# Patient Record
Sex: FEMALE | Race: WHITE | NOT HISPANIC OR LATINO | Employment: PART TIME | URBAN - METROPOLITAN AREA
[De-identification: names, ages, dates, MRNs, and addresses within clinical notes are randomized per-mention and may not be internally consistent; named-entity substitution may affect disease eponyms.]

---

## 2018-03-07 ENCOUNTER — HOSPITAL ENCOUNTER (EMERGENCY)
Facility: HOSPITAL | Age: 38
Discharge: HOME | End: 2018-03-08
Attending: EMERGENCY MEDICINE | Admitting: EMERGENCY MEDICINE

## 2018-03-07 DIAGNOSIS — J11.1 INFLUENZA: Primary | ICD-10-CM

## 2018-03-07 DIAGNOSIS — J02.0 ACUTE STREPTOCOCCAL PHARYNGITIS: ICD-10-CM

## 2018-03-07 PROCEDURE — 99283 EMERGENCY DEPT VISIT LOW MDM: CPT

## 2018-03-07 PROCEDURE — 99282 EMERGENCY DEPT VISIT SF MDM: CPT | Performed by: EMERGENCY MEDICINE

## 2018-03-07 RX ORDER — IBUPROFEN 400 MG/1
TABLET ORAL
Status: COMPLETED
Start: 2018-03-07 | End: 2018-03-07

## 2018-03-07 RX ORDER — ONDANSETRON 4 MG/1
4 TABLET, ORALLY DISINTEGRATING ORAL ONCE
Status: COMPLETED | OUTPATIENT
Start: 2018-03-07 | End: 2018-03-07

## 2018-03-07 RX ORDER — ACETAMINOPHEN 500 MG
1000 TABLET ORAL ONCE
Status: COMPLETED | OUTPATIENT
Start: 2018-03-07 | End: 2018-03-07

## 2018-03-07 RX ORDER — PENICILLIN V POTASSIUM 500 MG/1
500 TABLET ORAL 4 TIMES DAILY
Qty: 40 TABLET | Refills: 0 | Status: SHIPPED | OUTPATIENT
Start: 2018-03-07 | End: 2018-03-17

## 2018-03-07 RX ORDER — OSELTAMIVIR PHOSPHATE 75 MG/1
75 CAPSULE ORAL 2 TIMES DAILY
Qty: 10 CAPSULE | Refills: 0 | Status: SHIPPED | OUTPATIENT
Start: 2018-03-07 | End: 2018-03-12

## 2018-03-07 RX ORDER — ONDANSETRON 4 MG/1
4 TABLET, ORALLY DISINTEGRATING ORAL EVERY 6 HOURS PRN
Qty: 20 TABLET | Refills: 0 | Status: SHIPPED | OUTPATIENT
Start: 2018-03-07 | End: 2019-01-11

## 2018-03-07 RX ORDER — OSELTAMIVIR PHOSPHATE 75 MG/1
75 CAPSULE ORAL ONCE
Status: COMPLETED | OUTPATIENT
Start: 2018-03-07 | End: 2018-03-07

## 2018-03-07 RX ORDER — PENICILLIN V POTASSIUM 250 MG/1
500 TABLET ORAL ONCE
Status: COMPLETED | OUTPATIENT
Start: 2018-03-07 | End: 2018-03-07

## 2018-03-07 RX ADMIN — ACETAMINOPHEN 1000 MG: 500 TABLET, FILM COATED ORAL at 23:14

## 2018-03-07 RX ADMIN — IBUPROFEN 800 MG: 400 TABLET ORAL at 23:14

## 2018-03-07 RX ADMIN — OSELTAMIVIR PHOSPHATE 75 MG: 75 CAPSULE ORAL at 23:15

## 2018-03-07 RX ADMIN — ONDANSETRON 4 MG: 4 TABLET, ORALLY DISINTEGRATING ORAL at 23:14

## 2018-03-07 RX ADMIN — PENICILLIN V POTASSIUM 500 MG: 250 TABLET ORAL at 23:15

## 2018-03-08 VITALS
OXYGEN SATURATION: 96 % | TEMPERATURE: 99 F | WEIGHT: 137 LBS | SYSTOLIC BLOOD PRESSURE: 133 MMHG | RESPIRATION RATE: 18 BRPM | DIASTOLIC BLOOD PRESSURE: 90 MMHG | HEART RATE: 100 BPM

## 2018-03-08 NOTE — DISCHARGE INSTRUCTIONS
Medications as directed. Plenty of fluids and rest. Tylenol or Ibuprofen as needed for fever,  Body aches and pain. Follow up with PMD as above. Return to ED for medical emergencies.    Influenza, Adult  Influenza, more commonly known as “the flu,” is a viral infection that primarily affects the respiratory tract. The respiratory tract includes organs that help you breathe, such as the lungs, nose, and throat. The flu causes many common cold symptoms, as well as a high fever and body aches.  The flu spreads easily from person to person (is contagious). Getting a flu shot (influenza vaccination) every year is the best way to prevent influenza.  What are the causes?  Influenza is caused by a virus. You can catch the virus by:  · Breathing in droplets from an infected person's cough or sneeze.  · Touching something that was recently contaminated with the virus and then touching your mouth, nose, or eyes.  What increases the risk?  The following factors may make you more likely to get the flu:  · Not cleaning your hands frequently with soap and water or alcohol-based hand .  · Having close contact with many people during cold and flu season.  · Touching your mouth, eyes, or nose without washing or sanitizing your hands first.  · Not drinking enough fluids or not eating a healthy diet.  · Not getting enough sleep or exercise.  · Being under a high amount of stress.  · Not getting a yearly (annual) flu shot.  You may be at a higher risk of complications from the flu, such as a severe lung infection (pneumonia), if you:  · Are over the age of 65.  · Are pregnant.  · Have a weakened disease-fighting system (immune system). You may have a weakened immune system if you:  ¨ Have HIV or AIDS.  ¨ Are undergoing chemotherapy.  ¨ Are taking medicines that reduce the activity of (suppress) the immune system.  · Have a long-term (chronic) illness, such as heart disease, kidney disease, diabetes, or lung disease.  · Have a  liver disorder.  · Are obese.  · Have anemia.  What are the signs or symptoms?  Symptoms of this condition typically last 4-10 days and may include:  · Fever.  · Chills.  · Headache, body aches, or muscle aches.  · Sore throat.  · Cough.  · Runny or congested nose.  · Chest discomfort and cough.  · Poor appetite.  · Weakness or tiredness (fatigue).  · Dizziness.  · Nausea or vomiting.  How is this diagnosed?  This condition may be diagnosed based on your medical history and a physical exam. Your health care provider may do a nose or throat swab test to confirm the diagnosis.  How is this treated?  If influenza is detected early, you can be treated with antiviral medicine that can reduce the length of your illness and the severity of your symptoms. This medicine may be given by mouth (orally) or through an IV tube that is inserted in one of your veins.  The goal of treatment is to relieve symptoms by taking care of yourself at home. This may include taking over-the-counter medicines, drinking plenty of fluids, and adding humidity to the air in your home.  In some cases, influenza goes away on its own. Severe influenza or complications from influenza may be treated in a hospital.  Follow these instructions at home:  · Take over-the-counter and prescription medicines only as told by your health care provider.  · Use a cool mist humidifier to add humidity to the air in your home. This can make breathing easier.  · Rest as needed.  · Drink enough fluid to keep your urine clear or pale yellow.  · Cover your mouth and nose when you cough or sneeze.  · Wash your hands with soap and water often, especially after you cough or sneeze. If soap and water are not available, use hand .  · Stay home from work or school as told by your health care provider. Unless you are visiting your health care provider, try to avoid leaving home until your fever has been gone for 24 hours without the use of medicine.  · Keep all  follow-up visits as told by your health care provider. This is important.  How is this prevented?  · Getting an annual flu shot is the best way to avoid getting the flu. You may get the flu shot in late summer, fall, or winter. Ask your health care provider when you should get your flu shot.  · Wash your hands often or use hand  often.  · Avoid contact with people who are sick during cold and flu season.  · Eat a healthy diet, drink plenty of fluids, get enough sleep, and exercise regularly.  Contact a health care provider if:  · You develop new symptoms.  · You have:  ¨ Chest pain.  ¨ Diarrhea.  ¨ A fever.  · Your cough gets worse.  · You produce more mucus.  · You feel nauseous or you vomit.  Get help right away if:  · You develop shortness of breath or difficulty breathing.  · Your skin or nails turn a bluish color.  · You have severe pain or stiffness in your neck.  · You develop a sudden headache or sudden pain in your face or ear.  · You cannot stop vomiting.  This information is not intended to replace advice given to you by your health care provider. Make sure you discuss any questions you have with your health care provider.  Document Released: 12/15/2001 Document Revised: 05/25/2017 Document Reviewed: 10/11/2016  [employer] Interactive Patient Education © 2017 [employer]

## 2018-03-08 NOTE — ED PROVIDER NOTES
Subjective   Patient is a 37 y.o. female presenting with influenza.   History provided by:  Patient  Influenza   Presenting symptoms: cough, fatigue, myalgias, nausea, sore throat and vomiting    Presenting symptoms: no fever, no headaches and no shortness of breath    Cough:     Cough characteristics:  Productive    Sputum characteristics:  White    Severity:  Moderate    Duration:  1 day    Timing:  Sporadic  Fatigue:     Severity:  Mild  Myalgias:     Location:  Generalized  Vomiting:     Quality:  Stomach contents    Number of occurrences:  Multiple  Severity:  Moderate  Relieved by:  None tried  Ineffective treatments:  None tried  Associated symptoms: decreased appetite and decreased physical activity    Associated symptoms: no chills, no ear pain, no mental status change, no congestion, no neck stiffness and no syncope    Risk factors: sick contacts (family members with influenza and strep)    Risk factors: not elderly, no diabetes problem, no heart disease, no kidney disease and no liver disease      HPI Narrative:Ms Talisha Salcido is a 38 yo WF who presents secondary to flu symptoms.  Patient reports multiple family members have been ill the past week.  She reports all 3 of her children were positive for both influenza and strep.  Patient had onset of symptoms yesterday.  Symptoms include sore throat, cough, nausea, vomiting, body aches.  Patient has not taken any over-the-counter medications at home for today symptoms.  She presents for evaluation.        Review of Systems   Constitutional: Positive for decreased appetite and fatigue. Negative for appetite change, chills, diaphoresis and fever.   HENT: Positive for sore throat. Negative for congestion and ear pain.    Eyes: Negative.    Respiratory: Positive for cough. Negative for chest tightness, shortness of breath and wheezing.    Cardiovascular: Negative for chest pain, palpitations and leg swelling.   Gastrointestinal: Positive for nausea and  vomiting.   Genitourinary: Negative.  Negative for difficulty urinating, flank pain, frequency and hematuria.   Musculoskeletal: Positive for myalgias. Negative for neck stiffness.   Skin: Negative.  Negative for color change, pallor and rash.   Neurological: Negative.  Negative for dizziness, seizures, syncope and headaches.   Psychiatric/Behavioral: Negative.  Negative for agitation, behavioral problems and hallucinations.       Past Medical History:   Diagnosis Date   • Depression        No Known Allergies    Past Surgical History:   Procedure Laterality Date   •  SECTION     • TUBAL ABDOMINAL LIGATION         History reviewed. No pertinent family history.    Social History     Social History   • Marital status:      Social History Main Topics   • Smoking status: Former Smoker   • Alcohol use No   • Drug use: No           Objective   Physical Exam   Constitutional: She is oriented to person, place, and time. She appears well-developed and well-nourished. No distress.   38 yo WF lying in bed. Pt is mildly ill appearing but non-toxic.  Occasionally cough during H&P.    HENT:   Head: Normocephalic and atraumatic.   Right Ear: External ear normal.   Left Ear: External ear normal.   Nose: Nose normal.   Mouth/Throat: Posterior oropharyngeal erythema present. No oropharyngeal exudate, posterior oropharyngeal edema or tonsillar abscesses.   Shotty lymphadenopathy bilateral submandibular and anterior chain.   Eyes: Conjunctivae and EOM are normal. Pupils are equal, round, and reactive to light.   Neck: Normal range of motion. Neck supple. No tracheal tenderness present. No rigidity. No tracheal deviation and normal range of motion present. No thyromegaly present.   Cardiovascular: Normal rate, regular rhythm, normal heart sounds and intact distal pulses.  Exam reveals no gallop and no friction rub.    No murmur heard.  Pulmonary/Chest: Effort normal and breath sounds normal. No stridor.   Abdominal: Soft.  Bowel sounds are normal. She exhibits no distension. There is no tenderness.   Musculoskeletal: Normal range of motion.   Neurological: She is alert and oriented to person, place, and time.   Skin: Skin is warm and dry. She is not diaphoretic.   Psychiatric: She has a normal mood and affect. Her behavior is normal.   Nursing note and vitals reviewed.      Procedures         ED Course  ED Course   Comment By Time   03/07/18  11:08 PM  Patient reports her 3 children had both flu swab and strep swabs were positive.  Patient has both flu and strep symptoms.  Thus we'll treat for both.  Giving Tylenol, Zofran, Pen-Vee K and Tamiflu. Juan F Salas MD 03/07 2308   03/08/18  12:12 AM  Patient's temperature now 99.0. Heart rate has normalized.  Will DC home. Juan F Salas MD 03/08 0012                  MDM  Number of Diagnoses or Management Options  Acute streptococcal pharyngitis: new and does not require workup  Influenza: new and does not require workup  Risk of Complications, Morbidity, and/or Mortality  Presenting problems: low  Diagnostic procedures: minimal    Patient Progress  Patient progress: improved      Final diagnoses:   Influenza   Acute streptococcal pharyngitis              Juan F Salas MD  03/08/18 0013

## 2018-07-23 ENCOUNTER — TRANSCRIBE ORDERS (OUTPATIENT)
Dept: ADMINISTRATIVE | Facility: HOSPITAL | Age: 38
End: 2018-07-23

## 2018-07-23 DIAGNOSIS — Z12.39 SCREENING BREAST EXAMINATION: Primary | ICD-10-CM

## 2018-09-30 ENCOUNTER — HOSPITAL ENCOUNTER (EMERGENCY)
Facility: HOSPITAL | Age: 38
Discharge: HOME | End: 2018-09-30
Attending: EMERGENCY MEDICINE | Admitting: EMERGENCY MEDICINE

## 2018-09-30 ENCOUNTER — APPOINTMENT (OUTPATIENT)
Dept: ULTRASOUND IMAGING | Facility: HOSPITAL | Age: 38
End: 2018-09-30

## 2018-09-30 VITALS
RESPIRATION RATE: 16 BRPM | DIASTOLIC BLOOD PRESSURE: 99 MMHG | BODY MASS INDEX: 27.05 KG/M2 | HEIGHT: 62 IN | TEMPERATURE: 98.4 F | SYSTOLIC BLOOD PRESSURE: 135 MMHG | HEART RATE: 82 BPM | OXYGEN SATURATION: 99 % | WEIGHT: 147 LBS

## 2018-09-30 DIAGNOSIS — R10.2 PELVIC PAIN: Primary | ICD-10-CM

## 2018-09-30 DIAGNOSIS — N83.201 OVARIAN CYST, RIGHT: ICD-10-CM

## 2018-09-30 LAB
ALBUMIN SERPL-MCNC: 4.2 G/DL (ref 3.5–5.2)
ALBUMIN/GLOB SERPL: 1.6 G/DL
ALP SERPL-CCNC: 130 U/L (ref 40–129)
ALT SERPL W P-5'-P-CCNC: 50 U/L (ref 5–33)
ANION GAP SERPL CALCULATED.3IONS-SCNC: 10.4 MMOL/L
AST SERPL-CCNC: 47 U/L (ref 5–32)
B-HCG UR QL: NEGATIVE
BACTERIA UR QL AUTO: ABNORMAL /HPF
BASOPHILS # BLD AUTO: 0.03 10*3/MM3 (ref 0–0.2)
BASOPHILS NFR BLD AUTO: 0.5 % (ref 0–2)
BILIRUB SERPL-MCNC: 0.3 MG/DL (ref 0.2–1.2)
BILIRUB UR QL STRIP: NEGATIVE
BUN BLD-MCNC: 10 MG/DL (ref 6–20)
BUN/CREAT SERPL: 16.7 (ref 7–25)
CALCIUM SPEC-SCNC: 8.4 MG/DL (ref 8.6–10.5)
CHLORIDE SERPL-SCNC: 106 MMOL/L (ref 98–107)
CLARITY UR: ABNORMAL
CLUE CELLS SPEC QL WET PREP: ABNORMAL
CO2 SERPL-SCNC: 24.6 MMOL/L (ref 22–29)
COLOR UR: ABNORMAL
CREAT BLD-MCNC: 0.6 MG/DL (ref 0.57–1)
DEPRECATED RDW RBC AUTO: 43.7 FL (ref 37–54)
EOSINOPHIL # BLD AUTO: 0.02 10*3/MM3 (ref 0.1–0.3)
EOSINOPHIL NFR BLD AUTO: 0.4 % (ref 0–4)
ERYTHROCYTE [DISTWIDTH] IN BLOOD BY AUTOMATED COUNT: 12.3 % (ref 11.5–14.5)
GFR SERPL CREATININE-BSD FRML MDRD: 112 ML/MIN/1.73
GLOBULIN UR ELPH-MCNC: 2.7 GM/DL
GLUCOSE BLD-MCNC: 82 MG/DL (ref 65–99)
GLUCOSE UR STRIP-MCNC: NEGATIVE MG/DL
HCG SERPL QL: NEGATIVE
HCT VFR BLD AUTO: 40.1 % (ref 37–47)
HGB BLD-MCNC: 13.6 G/DL (ref 12–16)
HGB UR QL STRIP.AUTO: ABNORMAL
HYALINE CASTS UR QL AUTO: ABNORMAL /LPF
HYDATID CYST SPEC WET PREP: ABNORMAL
IMM GRANULOCYTES # BLD: 0.01 10*3/MM3 (ref 0–0.03)
IMM GRANULOCYTES NFR BLD: 0.2 % (ref 0–0.5)
KETONES UR QL STRIP: NEGATIVE
KOH PREP NAIL: NORMAL
LEUKOCYTE ESTERASE UR QL STRIP.AUTO: NEGATIVE
LIPASE SERPL-CCNC: 99 U/L (ref 13–60)
LYMPHOCYTES # BLD AUTO: 1.46 10*3/MM3 (ref 0.6–4.8)
LYMPHOCYTES NFR BLD AUTO: 26.7 % (ref 20–45)
MCH RBC QN AUTO: 32.8 PG (ref 27–31)
MCHC RBC AUTO-ENTMCNC: 33.9 G/DL (ref 31–37)
MCV RBC AUTO: 96.6 FL (ref 81–99)
MONOCYTES # BLD AUTO: 0.35 10*3/MM3 (ref 0–1)
MONOCYTES NFR BLD AUTO: 6.4 % (ref 3–8)
NEUTROPHILS # BLD AUTO: 3.6 10*3/MM3 (ref 1.5–8.3)
NEUTROPHILS NFR BLD AUTO: 65.8 % (ref 45–70)
NITRITE UR QL STRIP: NEGATIVE
NRBC BLD MANUAL-RTO: 0 /100 WBC (ref 0–0)
PH UR STRIP.AUTO: 5.5 [PH] (ref 4.5–8)
PLATELET # BLD AUTO: 274 10*3/MM3 (ref 140–500)
PMV BLD AUTO: 9.4 FL (ref 7.4–10.4)
POTASSIUM BLD-SCNC: 4 MMOL/L (ref 3.5–5.2)
PROT SERPL-MCNC: 6.9 G/DL (ref 6–8.5)
PROT UR QL STRIP: ABNORMAL
RBC # BLD AUTO: 4.15 10*6/MM3 (ref 4.2–5.4)
RBC # UR: ABNORMAL /HPF
REF LAB TEST METHOD: ABNORMAL
SODIUM BLD-SCNC: 141 MMOL/L (ref 136–145)
SP GR UR STRIP: 1.03 (ref 1–1.03)
SQUAMOUS #/AREA URNS HPF: ABNORMAL /HPF
T VAGINALIS SPEC QL WET PREP: ABNORMAL
URATE CRY URNS QL MICRO: ABNORMAL /HPF
UROBILINOGEN UR QL STRIP: ABNORMAL
WBC NRBC COR # BLD: 5.47 10*3/MM3 (ref 4.8–10.8)
WBC SPEC QL WET PREP: ABNORMAL
WBC UR QL AUTO: ABNORMAL /HPF
YEAST GENITAL QL WET PREP: ABNORMAL

## 2018-09-30 PROCEDURE — 99282 EMERGENCY DEPT VISIT SF MDM: CPT | Performed by: EMERGENCY MEDICINE

## 2018-09-30 PROCEDURE — 87210 SMEAR WET MOUNT SALINE/INK: CPT | Performed by: EMERGENCY MEDICINE

## 2018-09-30 PROCEDURE — 84703 CHORIONIC GONADOTROPIN ASSAY: CPT | Performed by: EMERGENCY MEDICINE

## 2018-09-30 PROCEDURE — 96374 THER/PROPH/DIAG INJ IV PUSH: CPT

## 2018-09-30 PROCEDURE — 25010000002 KETOROLAC TROMETHAMINE PER 15 MG: Performed by: EMERGENCY MEDICINE

## 2018-09-30 PROCEDURE — 80053 COMPREHEN METABOLIC PANEL: CPT | Performed by: EMERGENCY MEDICINE

## 2018-09-30 PROCEDURE — 85025 COMPLETE CBC W/AUTO DIFF WBC: CPT | Performed by: EMERGENCY MEDICINE

## 2018-09-30 PROCEDURE — 81025 URINE PREGNANCY TEST: CPT | Performed by: EMERGENCY MEDICINE

## 2018-09-30 PROCEDURE — 76856 US EXAM PELVIC COMPLETE: CPT

## 2018-09-30 PROCEDURE — 99284 EMERGENCY DEPT VISIT MOD MDM: CPT

## 2018-09-30 PROCEDURE — 81001 URINALYSIS AUTO W/SCOPE: CPT | Performed by: EMERGENCY MEDICINE

## 2018-09-30 PROCEDURE — 76830 TRANSVAGINAL US NON-OB: CPT

## 2018-09-30 PROCEDURE — 83690 ASSAY OF LIPASE: CPT | Performed by: EMERGENCY MEDICINE

## 2018-09-30 PROCEDURE — 87220 TISSUE EXAM FOR FUNGI: CPT | Performed by: EMERGENCY MEDICINE

## 2018-09-30 RX ORDER — KETOROLAC TROMETHAMINE 30 MG/ML
15 INJECTION, SOLUTION INTRAMUSCULAR; INTRAVENOUS ONCE
Status: COMPLETED | OUTPATIENT
Start: 2018-09-30 | End: 2018-09-30

## 2018-09-30 RX ORDER — SODIUM CHLORIDE 0.9 % (FLUSH) 0.9 %
10 SYRINGE (ML) INJECTION AS NEEDED
Status: DISCONTINUED | OUTPATIENT
Start: 2018-09-30 | End: 2018-09-30 | Stop reason: HOSPADM

## 2018-09-30 RX ADMIN — KETOROLAC TROMETHAMINE 15 MG: 30 INJECTION INTRAMUSCULAR; INTRAVENOUS at 13:16

## 2018-10-10 ENCOUNTER — OFFICE VISIT (OUTPATIENT)
Dept: OBSTETRICS AND GYNECOLOGY | Facility: CLINIC | Age: 38
End: 2018-10-10

## 2018-10-10 VITALS
WEIGHT: 145 LBS | DIASTOLIC BLOOD PRESSURE: 64 MMHG | BODY MASS INDEX: 26.68 KG/M2 | HEIGHT: 62 IN | SYSTOLIC BLOOD PRESSURE: 100 MMHG

## 2018-10-10 DIAGNOSIS — Z90.79 H/O BILATERAL SALPINGECTOMY: ICD-10-CM

## 2018-10-10 DIAGNOSIS — N83.202 BILATERAL OVARIAN CYSTS: Primary | ICD-10-CM

## 2018-10-10 DIAGNOSIS — N83.201 BILATERAL OVARIAN CYSTS: Primary | ICD-10-CM

## 2018-10-10 DIAGNOSIS — R10.2 PELVIC PAIN IN FEMALE: ICD-10-CM

## 2018-10-10 PROCEDURE — 99204 OFFICE O/P NEW MOD 45 MIN: CPT | Performed by: OBSTETRICS & GYNECOLOGY

## 2018-10-10 NOTE — PROGRESS NOTES
Patient Care Team:  Ana Laura Ramos APRN as PCP - General (Family Medicine)    Patient new to practice? yes Patient new to examiner? yes     -----------------------------------------------------HISTORY---------------------------------------------------    Chief Complaint:   Chief Complaint   Patient presents with   • Consult     wants hysterectomy     New problem to examiner? yes    Patient's last menstrual period was 2018.      HPI:     Pt wants a hysterectomy.  Was told years ago she could have a hysterectomy by Mikki Jules for bleeding and pain.  Pt had Mirena placed and this has fixed her bleeding.   Pt has still has pain with ovarian cysts and the IUD is causing her pain. Pt has tried patches without success and wants definitive therapy.     HPI      Review of Systems   Constitutional: Negative.    HENT: Negative.    Eyes: Negative.    Respiratory: Negative.    Cardiovascular: Negative.    Gastrointestinal: Negative.    Endocrine: Negative.    Genitourinary: Positive for pelvic pain.   Musculoskeletal: Negative.    Skin: Negative.    Allergic/Immunologic: Negative.    Neurological: Negative.    Hematological: Negative.    Psychiatric/Behavioral: Negative.    :      PFSH: PAST HISTORY REVIEWED     1.    Past Medical History:   Diagnosis Date   • Depression            Status of: reviewed.      2. No family history on file.    3. Social History: :    Employment/occupation:  yes  Smoker: no  Alcohol: no Recreational drugs: no     4.   Past Surgical History:   Procedure Laterality Date   •  SECTION     • TUBAL ABDOMINAL LIGATION          History of classical Csection?  No.  PT HAS HAD 3 CSECTIONS.     5.   Current Outpatient Prescriptions:   •  diclofenac (VOLTAREN) 50 MG EC tablet, Take one tablet by mouth up to 3 times daily as needed for pain, Disp: 20 tablet, Rfl: 0  •  Escitalopram Oxalate (LEXAPRO PO), Take 10 mg by mouth., Disp: , Rfl:   •  ondansetron ODT (ZOFRAN ODT) 4 MG  "disintegrating tablet, Take 1 tablet by mouth Every 6 (Six) Hours As Needed for Nausea or Vomiting for up to 20 doses., Disp: 20 tablet, Rfl: 0  •  TRAZODONE HCL PO, Take  by mouth At Night As Needed., Disp: , Rfl:     6. No Known Allergies                  -----------------------------------------------PHYSICAL EXAM----------------------------------------------    Vital Signs: /64   Ht 157 cm (61.81\")   Wt 65.8 kg (145 lb)   LMP 09/26/2018   BMI 26.68 kg/m²    Flowsheet Rows      First Filed Value   Admission Height  157 cm (61.81\") Documented at 10/10/2018 1552   Admission Weight  65.8 kg (145 lb) Documented at 10/10/2018 1552          Physical Exam   Constitutional: She is oriented to person, place, and time. She appears well-developed and well-nourished. No distress.   Abdominal: Soft. She exhibits no distension and no mass. There is no tenderness. There is no rebound and no guarding. No hernia.   Musculoskeletal: Normal range of motion.   Neurological: She is alert and oriented to person, place, and time.   Skin: Skin is warm and dry. No rash noted. She is not diaphoretic. No erythema. No pallor.   Psychiatric: She has a normal mood and affect. Her behavior is normal. Judgment and thought content normal.   Nursing note and vitals reviewed.                          -----------------------------------------------MEDICAL DECISION MAKING-----------------------------  Risk counseling done:  YES    Results Reviewed:     1.   Lab Results (last 24 hours)     ** No results found for the last 24 hours. **        2.   Imaging Results (last 24 hours)     ** No results found for the last 24 hours. **        3.   ECG/EMG Results (most recent)     None          Old records reviewed?  NO    Diagnoses and/or chronic conditions reviewed with pt:  Talisha was seen today for consult.    Diagnoses and all orders for this visit:    Bilateral ovarian cysts    Pelvic pain in female    H/O bilateral salpingectomy        IMP: "  HX CHRONIC MENOMETRORRHAGIA.     PLAN: SONOHYSTOGRAM, EMBX, PAP    Labs/imaging ordered: NONE    RTO Return in about 2 weeks (around 10/24/2018).    Time: More than 50% of time spent in counseling and/or coordination of care:  Total face-to-face/floor time 49 min.  Time spent in counseling 30 min. Counseling included the following topics with prognosis, differential diagnosis, risks, benefits of treatment, instructions, complicance and/or risk reduction and alternatives: BLEEDING, TREATMENT OPTIONS.        Elvis Andre MD  4:21 PM  10/10/18

## 2018-11-14 ENCOUNTER — PROCEDURE VISIT (OUTPATIENT)
Dept: OBSTETRICS AND GYNECOLOGY | Facility: CLINIC | Age: 38
End: 2018-11-14

## 2018-11-14 ENCOUNTER — OFFICE VISIT (OUTPATIENT)
Dept: OBSTETRICS AND GYNECOLOGY | Facility: CLINIC | Age: 38
End: 2018-11-14

## 2018-11-14 DIAGNOSIS — Z11.51 SPECIAL SCREENING EXAMINATION FOR HUMAN PAPILLOMAVIRUS (HPV): ICD-10-CM

## 2018-11-14 DIAGNOSIS — N83.201 BILATERAL OVARIAN CYSTS: ICD-10-CM

## 2018-11-14 DIAGNOSIS — Z90.79 H/O BILATERAL SALPINGECTOMY: ICD-10-CM

## 2018-11-14 DIAGNOSIS — R10.2 PELVIC PAIN: ICD-10-CM

## 2018-11-14 DIAGNOSIS — Z12.4 PAP SMEAR FOR CERVICAL CANCER SCREENING: Primary | ICD-10-CM

## 2018-11-14 DIAGNOSIS — N92.0 MENORRHAGIA WITH REGULAR CYCLE: Primary | ICD-10-CM

## 2018-11-14 DIAGNOSIS — Z97.5 IUD CONTRACEPTION: ICD-10-CM

## 2018-11-14 DIAGNOSIS — R10.2 PELVIC PAIN IN FEMALE: ICD-10-CM

## 2018-11-14 DIAGNOSIS — N83.202 BILATERAL OVARIAN CYSTS: ICD-10-CM

## 2018-11-14 PROCEDURE — 58100 BIOPSY OF UTERUS LINING: CPT | Performed by: OBSTETRICS & GYNECOLOGY

## 2018-11-14 PROCEDURE — 76830 TRANSVAGINAL US NON-OB: CPT | Performed by: OBSTETRICS & GYNECOLOGY

## 2018-11-14 NOTE — PROGRESS NOTES
Patient Care Team:  Ana Laura Ramos APRN as PCP - General (Family Medicine)    Patient new to practice? no Patient new to examiner? no     -----------------------------------------------------HISTORY---------------------------------------------------    Chief Complaint: No chief complaint on file.    New problem to examiner? no    No LMP recorded.      HPI:     Here for sonohystogram, but pt has IUD.  Will do embx and pap.  Pt has hx pelvic pain and it was recommended that she have a hysterectomy.  Pt does not want a hysterectomy.  U/s abnormal.  Reviewed with pt:  ANTEVERTED UTERUS WITH AN ENDOMETRIAL MEASUREMENT OF cm. IUD SEEN IN THE ENDOMETRIUM  AT THE FUNDUS.  RIGHT OVARY APPEARS NORMAL.  TWO LEFT COMPLEX CYSTS MEASURING 3.8 x 2.4 x 2.6cm AND 4.2 x 2.9 x 2.2cm.  NO FREE FLUID OR ADNEXAL MASSES NOTED.        Pelvic Pain   The patient's primary symptoms include pelvic pain. The current episode started more than 1 year ago. The problem occurs intermittently. The problem has been unchanged. The problem affects the left side. Pertinent negatives include no fever.         Review of Systems   Constitutional: Negative for fever.   Genitourinary: Positive for pelvic pain.   :      PFSH: PAST HISTORY REVIEWED     1.    Past Medical History:   Diagnosis Date   • Depression            Status of: reviewed.      2. History reviewed. No pertinent family history.    3. Social History: :    Employment/occupation:  yes  Smoker: no  Alcohol: no Recreational drugs: no     4.   Past Surgical History:   Procedure Laterality Date   •  SECTION     • TUBAL ABDOMINAL LIGATION          History of classical Csection?  no    5.   Current Outpatient Medications:   •  diclofenac (VOLTAREN) 50 MG EC tablet, Take one tablet by mouth up to 3 times daily as needed for pain, Disp: 20 tablet, Rfl: 0  •  Escitalopram Oxalate (LEXAPRO PO), Take 10 mg by mouth., Disp: , Rfl:   •  ondansetron ODT (ZOFRAN ODT) 4 MG disintegrating  tablet, Take 1 tablet by mouth Every 6 (Six) Hours As Needed for Nausea or Vomiting for up to 20 doses., Disp: 20 tablet, Rfl: 0  •  TRAZODONE HCL PO, Take  by mouth At Night As Needed., Disp: , Rfl:     6. No Known Allergies                  -----------------------------------------------PHYSICAL EXAM----------------------------------------------    Vital Signs: There were no vitals taken for this visit.     Physical Exam   Constitutional: She is oriented to person, place, and time. She appears well-developed and well-nourished.   HENT:   Head: Normocephalic and atraumatic.   Pulmonary/Chest: Effort normal.   Abdominal: Soft. She exhibits no distension and no mass. There is no tenderness. There is no rebound and no guarding.   Genitourinary: Vagina normal and uterus normal.   Genitourinary Comments: IUD string seen, embx and pap done:    Endometrial Biopsy Procedure Note:    Pt was in the dorsolithotomy position. Speculum placed, cervix visualized, and betadine was applied to the os.  The endocervix was easily traversed and the uterus was sounded to 7 cm. Tenaculum was  used to stabilize the cervix. A pipelle was used to obtain endometrial tissue. All instruments were removed.  Pt tolerated procedure well.       Musculoskeletal: Normal range of motion.   Neurological: She is alert and oriented to person, place, and time.   Skin: Skin is warm and dry.   Psychiatric: She has a normal mood and affect. Her behavior is normal. Judgment and thought content normal.   Nursing note and vitals reviewed.                          -----------------------------------------------MEDICAL DECISION MAKING-----------------------------  Risk counseling done:  yes    Results Reviewed:     1.   Lab Results (last 24 hours)     ** No results found for the last 24 hours. **        2.   Imaging Results (last 24 hours)     ** No results found for the last 24 hours. **        3.   ECG/EMG Results (most recent)     None          Old records  reviewed?  no    Diagnoses and/or chronic conditions reviewed with pt:  Diagnoses and all orders for this visit:    Pap smear for cervical cancer screening  -     Pap IG, HPV-hr    Bilateral ovarian cysts    Pelvic pain in female  -     Reference Histopathology    Peripartum cardiomyopathy    H/O bilateral salpingectomy    Special screening examination for human papillomavirus (HPV)  -     Pap IG, HPV-hr    IUD contraception-Mirena        IMP:  Pelvic pain, IUD.      PLAN: check embx, pap.     Labs/imaging ordered: u/s today.     RTO Return if symptoms worsen or fail to improve.          Elvis Andre MD  10:50 AM  11/14/18

## 2018-11-19 LAB
DX ICD CODE: NORMAL
DX ICD CODE: NORMAL
PATH REPORT.FINAL DX SPEC: NORMAL
PATH REPORT.GROSS SPEC: NORMAL
PATH REPORT.SITE OF ORIGIN SPEC: NORMAL
PATHOLOGIST NAME: NORMAL
PAYMENT PROCEDURE: NORMAL

## 2018-11-20 PROBLEM — B97.7 HPV IN FEMALE: Status: ACTIVE | Noted: 2018-11-20

## 2018-11-20 LAB
CYTOLOGIST CVX/VAG CYTO: ABNORMAL
CYTOLOGY CVX/VAG DOC THIN PREP: ABNORMAL
DX ICD CODE: ABNORMAL
HIV 1 & 2 AB SER-IMP: ABNORMAL
HPV I/H RISK 1 DNA CVX QL PROBE+SIG AMP: POSITIVE
Lab: ABNORMAL
OTHER STN SPEC: ABNORMAL
PATH REPORT.FINAL DX SPEC: ABNORMAL
STAT OF ADQ CVX/VAG CYTO-IMP: ABNORMAL

## 2018-12-07 ENCOUNTER — HOSPITAL ENCOUNTER (EMERGENCY)
Facility: HOSPITAL | Age: 38
Discharge: HOME | End: 2018-12-07
Attending: EMERGENCY MEDICINE | Admitting: EMERGENCY MEDICINE

## 2018-12-07 ENCOUNTER — APPOINTMENT (OUTPATIENT)
Dept: GENERAL RADIOLOGY | Facility: HOSPITAL | Age: 38
End: 2018-12-07

## 2018-12-07 VITALS
WEIGHT: 150 LBS | SYSTOLIC BLOOD PRESSURE: 128 MMHG | DIASTOLIC BLOOD PRESSURE: 74 MMHG | HEART RATE: 87 BPM | HEIGHT: 62 IN | BODY MASS INDEX: 27.6 KG/M2 | OXYGEN SATURATION: 99 % | TEMPERATURE: 98.6 F | RESPIRATION RATE: 16 BRPM

## 2018-12-07 DIAGNOSIS — S91.341A: Primary | ICD-10-CM

## 2018-12-07 PROCEDURE — 90471 IMMUNIZATION ADMIN: CPT | Performed by: EMERGENCY MEDICINE

## 2018-12-07 PROCEDURE — 99283 EMERGENCY DEPT VISIT LOW MDM: CPT

## 2018-12-07 PROCEDURE — 25010000002 TDAP 5-2.5-18.5 LF-MCG/0.5 SUSPENSION: Performed by: EMERGENCY MEDICINE

## 2018-12-07 PROCEDURE — 90715 TDAP VACCINE 7 YRS/> IM: CPT | Performed by: EMERGENCY MEDICINE

## 2018-12-07 PROCEDURE — 10120 INC&RMVL FB SUBQ TISS SMPL: CPT | Performed by: EMERGENCY MEDICINE

## 2018-12-07 PROCEDURE — 73630 X-RAY EXAM OF FOOT: CPT

## 2018-12-07 RX ORDER — HYDROCODONE BITARTRATE AND ACETAMINOPHEN 5; 325 MG/1; MG/1
1-2 TABLET ORAL EVERY 4 HOURS PRN
Qty: 10 TABLET | Refills: 0 | Status: SHIPPED | OUTPATIENT
Start: 2018-12-07 | End: 2019-01-11

## 2018-12-07 RX ORDER — LEVOFLOXACIN 750 MG/1
750 TABLET ORAL DAILY
Qty: 7 TABLET | Refills: 0 | Status: SHIPPED | OUTPATIENT
Start: 2018-12-07 | End: 2018-12-14

## 2018-12-07 RX ORDER — LEVOFLOXACIN 750 MG/1
750 TABLET ORAL ONCE
Status: COMPLETED | OUTPATIENT
Start: 2018-12-07 | End: 2018-12-07

## 2018-12-07 RX ADMIN — LEVOFLOXACIN 750 MG: 750 TABLET, FILM COATED ORAL at 02:44

## 2018-12-07 RX ADMIN — TETANUS TOXOID, REDUCED DIPHTHERIA TOXOID AND ACELLULAR PERTUSSIS VACCINE, ADSORBED 0.5 ML: 5; 2.5; 8; 8; 2.5 SUSPENSION INTRAMUSCULAR at 01:22

## 2018-12-07 NOTE — ED PROVIDER NOTES
"Subjective     History provided by:  Patient    History of Present Illness    · Chief complaint: Stepped on a nail    · Location: Ball of her left foot    · Quality/Severity:Moderate pain, small sized nail puncture site     · Timing/Onset:Midnight     · Modifying Factors: Weight bearing on the site exacerbates pain    · Associated symptoms: Tenderness around puncture site.  Denies uncontrolled bleeding from the site.  Denies any fever.    · Narrative: The patient is a 38-year-old white female who presents after stepping on a nail.  The nail went through her left boot and punctured the ball of her left foot.  This occurred at midnight just prior to arrival.  She rinsed the site and put a Band-Aid over the puncture wound.  She denies any uncontrolled bleeding.  The nail stayed in her shoe after she lifted her foot but did not remain penetrated in the skin.  She says that she \"hobbled\" into the ER because she cannot put any weight on the site.  She says her tetanus shot is not up-to-date.  She denies any other secondary injuries.    ED Triage Vitals   Temp Heart Rate Resp BP SpO2   12/07/18 0249 12/07/18 0249 12/07/18 0249 12/07/18 0058 12/07/18 0058   98.6 °F (37 °C) 87 16 136/90 99 %      Temp src Heart Rate Source Patient Position BP Location FiO2 (%)   12/07/18 0249 12/07/18 0249 -- -- --   Oral Monitor          Review of Systems   Constitutional: Negative for activity change, appetite change, chills, diaphoresis, fatigue and fever.   HENT: Negative for congestion, dental problem, ear pain, hearing loss, mouth sores, postnasal drip, rhinorrhea, sinus pressure, sore throat and voice change.    Eyes: Negative for photophobia, pain, discharge, redness and visual disturbance.   Respiratory: Negative for cough, chest tightness, shortness of breath, wheezing and stridor.    Cardiovascular: Negative for chest pain, palpitations and leg swelling.   Gastrointestinal: Negative for abdominal pain, diarrhea, nausea and " vomiting.   Genitourinary: Negative for difficulty urinating, dysuria, flank pain, frequency, hematuria and urgency.   Musculoskeletal: Positive for gait problem. Negative for arthralgias, back pain, joint swelling, myalgias, neck pain and neck stiffness.        Gait change secondary to not being able to put weight on the puncture wound.   Skin: Positive for wound. Negative for color change and rash.        Small puncture site on the ball of her left foot   Neurological: Negative for dizziness, tremors, seizures, syncope, facial asymmetry, speech difficulty, weakness, light-headedness, numbness and headaches.   Hematological: Negative for adenopathy.   Psychiatric/Behavioral: Negative.  Negative for confusion, decreased concentration and self-injury. The patient is not nervous/anxious.        Past Medical History:   Diagnosis Date   • Depression        No Known Allergies    Past Surgical History:   Procedure Laterality Date   •  SECTION     • TUBAL ABDOMINAL LIGATION         History reviewed. No pertinent family history.    Social History     Socioeconomic History   • Marital status:      Spouse name: Not on file   • Number of children: Not on file   • Years of education: Not on file   • Highest education level: Not on file   Tobacco Use   • Smoking status: Former Smoker   Substance and Sexual Activity   • Alcohol use: No   • Drug use: No           Objective   Physical Exam   Constitutional: She is oriented to person, place, and time. She appears well-developed and well-nourished. No distress.   The patient is in no active distress.  Review of her vital signs: Her blood pressure is slightly elevated at 136/90, respiration rate normal at 16, heart rate slightly elevated at 87, she is afebrile at 98.6, normal O2 saturation 99% on room air.   HENT:   Head: Normocephalic and atraumatic.   Nose: Nose normal.   Mouth/Throat: Oropharynx is clear and moist. No oropharyngeal exudate.   Eyes: EOM are normal.  Pupils are equal, round, and reactive to light. Right eye exhibits no discharge. Left eye exhibits no discharge. No scleral icterus.   Neck: Normal range of motion. Neck supple. No JVD present. No thyromegaly present.   Cardiovascular: Normal rate, regular rhythm and normal heart sounds.   No murmur heard.  Pulmonary/Chest: Effort normal and breath sounds normal. She has no wheezes. She has no rales. She exhibits no tenderness.   Abdominal: Soft. Bowel sounds are normal. She exhibits no distension. There is no tenderness.   Musculoskeletal: Normal range of motion. She exhibits tenderness. She exhibits no edema or deformity.   Tenderness to palpation on the ball of left foot near puncture site.   Lymphadenopathy:     She has no cervical adenopathy.   Neurological: She is alert and oriented to person, place, and time. No cranial nerve deficit. Coordination normal.   No focal motor sensory deficit   Skin: Skin is warm and dry. Capillary refill takes less than 2 seconds. No rash noted. She is not diaphoretic.   Puncture site to ball of left foot with visible black foreign body consistent with a piece of rubber from the boot in the puncture wound.   Psychiatric: She has a normal mood and affect. Her behavior is normal. Judgment and thought content normal.   Nursing note and vitals reviewed.      Procedures           ED Course  ED Course as of Dec 07 0402   Fri Dec 07, 2018   0231 [facility] Report [id number] is blank  [TP]   8607 20:20 procedure coring of the puncture wound of the ball of the left foot.  Left foot was cleansed with Hibiclens scrub.  The puncture site was then anesthetized with lidocaine 2% with epinephrine buffered with Neut.  The puncture site with a foreign body was then cored with 11 blade scalpel and removed.  The cord cavity was then irrigated with normal saline and dressed with bacitracin.  [TP]   0400 The patient was administered Levaquin 750 mg po.  She was discharged with prescription for Levaquin 750  mg #7 and well a prescription for Lortab 5 mg #10.  She is instructed to make an appointment to follow with her PCP in a couple of days.  [TP]   0401 Her x-ray of her left foot showed no foreign body or fracture.  [TP]      ED Course User Index  [TP] Gino Koo MD                  MDM  Number of Diagnoses or Management Options  Puncture wound of foot with foreign body, right, initial encounter: new and requires workup     Amount and/or Complexity of Data Reviewed  Tests in the radiology section of CPT®: ordered and reviewed  Independent visualization of images, tracings, or specimens: yes    Patient Progress  Patient progress: improved        Final diagnoses:   Puncture wound of foot with foreign body, right, initial encounter           Labs Reviewed - No data to display  XR Foot 3+ View Left   ED Interpretation   No fracture or bony deformity.  No soft tissue foreign body.             Medication List      New Prescriptions    HYDROcodone-acetaminophen 5-325 MG per tablet  Commonly known as:  NORCO  Take 1-2 tablets by mouth Every 4 (Four) Hours As Needed for Severe Pain    for up to 10 doses.     levoFLOXacin 750 MG tablet  Commonly known as:  LEVAQUIN  Take 1 tablet by mouth Daily for 7 days.        Stop    diclofenac 50 MG EC tablet  Commonly known as:  Gino Trimble MD  12/07/18 0401

## 2018-12-07 NOTE — DISCHARGE INSTRUCTIONS
Wash the punctured area of your foot twice a day with soap and water and apply Neosporin and a Band-Aid.  Return if there develops redness, swelling, purulent drainage, or fever.

## 2019-01-08 ENCOUNTER — PROCEDURE VISIT (OUTPATIENT)
Dept: OBSTETRICS AND GYNECOLOGY | Facility: CLINIC | Age: 39
End: 2019-01-08

## 2019-01-08 ENCOUNTER — OFFICE VISIT (OUTPATIENT)
Dept: OBSTETRICS AND GYNECOLOGY | Facility: CLINIC | Age: 39
End: 2019-01-08

## 2019-01-08 VITALS — HEIGHT: 62 IN | BODY MASS INDEX: 27.2 KG/M2 | WEIGHT: 147.8 LBS

## 2019-01-08 DIAGNOSIS — Z98.891 HISTORY OF 3 CESAREAN SECTIONS: ICD-10-CM

## 2019-01-08 DIAGNOSIS — N83.202 BILATERAL OVARIAN CYSTS: Primary | ICD-10-CM

## 2019-01-08 DIAGNOSIS — N92.1 MENORRHAGIA WITH IRREGULAR CYCLE: ICD-10-CM

## 2019-01-08 DIAGNOSIS — R10.2 PELVIC PAIN IN FEMALE: ICD-10-CM

## 2019-01-08 DIAGNOSIS — N83.201 BILATERAL OVARIAN CYSTS: Primary | ICD-10-CM

## 2019-01-08 DIAGNOSIS — N83.202 BILATERAL OVARIAN CYSTS: ICD-10-CM

## 2019-01-08 DIAGNOSIS — N83.201 BILATERAL OVARIAN CYSTS: ICD-10-CM

## 2019-01-08 DIAGNOSIS — Z97.5 IUD CONTRACEPTION: ICD-10-CM

## 2019-01-08 DIAGNOSIS — Z90.79 H/O BILATERAL SALPINGECTOMY: ICD-10-CM

## 2019-01-08 PROCEDURE — 99214 OFFICE O/P EST MOD 30 MIN: CPT | Performed by: OBSTETRICS & GYNECOLOGY

## 2019-01-08 PROCEDURE — 76830 TRANSVAGINAL US NON-OB: CPT | Performed by: OBSTETRICS & GYNECOLOGY

## 2019-01-08 NOTE — PROGRESS NOTES
Patient Care Team:  Ana Laura Ramos APRN as PCP - General (Family Medicine)    Patient new to practice? no Patient new to examiner? no     -----------------------------------------------------HISTORY---------------------------------------------------    Chief Complaint:   Chief Complaint   Patient presents with   • Pre-op Exam     New problem to examiner? no    No LMP recorded.    HPI: History of Present Illness      Pt here to discuss endometrial ablation.  Pt has an IUD for menstrual control without success.  Has had a neg endometrial biopsy recently and desires ablation.  Has had her tubes tied.    Pt has hx ovarian cysts, and the last u/s showed:   ANTEVERTED UTERUS WITH AN ENDOMETRIAL MEASUREMENT OF cm. IUD SEEN IN THE ENDOMETRIUM  AT THE FUNDUS.  RIGHT OVARY APPEARS NORMAL.  TWO LEFT COMPLEX CYSTS MEASURING 3.8 x 2.4 x 2.6cm AND 4.2 x 2.9 x 2.2cm.  NO FREE FLUID OR ADNEXAL MASSES NOTED.    RPT U/S TODAY:  UTERUS IS ANTEVERTED  NL RT OV  LT OV CONTAINS 2 COMPLEX CYSTS= 1.6 X 1.0 CM AND 1.5 X 1.7 CM  SMALL AMT OF FREE FLUID WITHIN ENDOMETRIUM AND INTO UTERINE SCAR  NO ENDOMETRIAL MASSES SEEN  IUD NOTED IN PLACE    ROS:  Review of Systems   Constitutional: Negative.    HENT: Negative.    Eyes: Negative.    Respiratory: Negative.    Cardiovascular: Negative.    Gastrointestinal: Negative.    Endocrine: Negative.    Genitourinary: Positive for menstrual problem.   Musculoskeletal: Negative.    Skin: Negative.    Allergic/Immunologic: Negative.    Neurological: Negative.    Hematological: Negative.    Psychiatric/Behavioral: Negative.    :      PF[facility]: PAST HISTORY REVIEWED     1.    Past Medical History:   Diagnosis Date   • Depression            Status of: reviewed     2. History reviewed. No pertinent family history.    3. Social History: :    Employment/occupation:  yes  Smoker: no  Alcohol: no Recreational drugs: no     4.   Past Surgical History:   Procedure Laterality Date   •  SECTION    "  • TUBAL ABDOMINAL LIGATION          History of classical Csection?  No, but pt has had 3 sections.    5.   Current Outpatient Medications:   •  Escitalopram Oxalate (LEXAPRO PO), Take 10 mg by mouth., Disp: , Rfl:   •  HYDROcodone-acetaminophen (NORCO) 5-325 MG per tablet, Take 1-2 tablets by mouth Every 4 (Four) Hours As Needed for Severe Pain  for up to 10 doses., Disp: 10 tablet, Rfl: 0  •  ondansetron ODT (ZOFRAN ODT) 4 MG disintegrating tablet, Take 1 tablet by mouth Every 6 (Six) Hours As Needed for Nausea or Vomiting for up to 20 doses., Disp: 20 tablet, Rfl: 0  •  TRAZODONE HCL PO, Take  by mouth At Night As Needed., Disp: , Rfl:     6. No Known Allergies                  -----------------------------------------------PHYSICAL EXAM----------------------------------------------    Vital Signs: Ht 157.5 cm (62.01\")   Wt 67 kg (147 lb 12.8 oz)   Breastfeeding? No   BMI 27.03 kg/m²    Flowsheet Rows      First Filed Value   Admission Height  157.5 cm (62.01\") Documented at 01/08/2019 1416   Admission Weight  67 kg (147 lb 12.8 oz) Documented at 01/08/2019 1416          Physical Exam   Constitutional: She is oriented to person, place, and time. She appears well-developed and well-nourished. No distress.   HENT:   Head: Normocephalic and atraumatic.   Eyes: EOM are normal.   Neck: Normal range of motion.   Pulmonary/Chest: Breath sounds normal.   Abdominal: Soft. Bowel sounds are normal. She exhibits no distension and no mass. There is no tenderness. There is no rebound and no guarding. No hernia.   Musculoskeletal: Normal range of motion.   Neurological: She is alert and oriented to person, place, and time.   Skin: Skin is warm and dry. No rash noted. She is not diaphoretic. No erythema. No pallor.   Psychiatric: She has a normal mood and affect. Her behavior is normal. Judgment and thought content normal.   Nursing note and vitals " reviewed.                          -----------------------------------------------MEDICAL DECISION MAKING-----------------------------  Risk counseling done:  no    Results Reviewed:     1.   Lab Results (last 24 hours)     ** No results found for the last 24 hours. **        2.   Imaging Results (last 24 hours)     ** No results found for the last 24 hours. **        3.   ECG/EMG Results (most recent)     None          Old records reviewed?  yes    Diagnoses and/or chronic conditions reviewed with pt:  Talisha was seen today for pre-op exam.    Diagnoses and all orders for this visit:    Peripartum cardiomyopathy    Pelvic pain in female    Bilateral ovarian cysts    IUD contraception-Mirena  -     Case Request; Standing  -     CBC and Differential; Future  -     Case Request    H/O bilateral salpingectomy  -     Case Request; Standing  -     CBC and Differential; Future  -     Case Request    Menorrhagia with irregular cycle  -     Case Request; Standing  -     CBC and Differential; Future  -     Case Request    History of 3  sections    Other orders  -     Follow Anesthesia Guidelines / Standing Orders; Future  -     Follow Anesthesia Guidelines / Standing Orders; Standing  -     Obtain informed consent; Standing  -     Place sequential compression device; Standing        IMP:  Chronic menometrorrhagia refractory to IUD.  Hx ovarian cysts, resolved.  Hx tubal.    PLAN: IUD removal, dx hysteroscopy, D&C, Novasure endometrial ablation.    Labs/imaging ordered: u/s    RISKS, ALTERNATIVES, COMPLICATIONS OF THE PROCEDURE INCLUDING BUT NOT LIMITED TO:    INTRAOPERATIVE RISKS: INJURY TO INTERNAL ORGANS (BOWEL, BLADDER, URETER,BLOOD VESSELS) OR HEMORRHAGE REQUIRING FURTHER SURGERY, INFECTION, AND DEATH;   POSTOP COMPLICATIONS: BLEEDING, INFECTION, PNEUMONIA, PULMONARY EMBOLISM, AND DEATH;   WERE EXPLAINED TO THE PT WHO VERBALIZED HER UNDERSTANDING.        RTO Return if symptoms worsen or fail to  improve.          Elvis Andre MD  3:22 PM  01/08/19

## 2019-01-11 ENCOUNTER — ANESTHESIA EVENT (OUTPATIENT)
Dept: PERIOP | Facility: HOSPITAL | Age: 39
End: 2019-01-11

## 2019-01-11 ENCOUNTER — APPOINTMENT (OUTPATIENT)
Dept: PREADMISSION TESTING | Facility: HOSPITAL | Age: 39
End: 2019-01-11

## 2019-01-11 VITALS
DIASTOLIC BLOOD PRESSURE: 77 MMHG | OXYGEN SATURATION: 98 % | HEART RATE: 66 BPM | BODY MASS INDEX: 27.27 KG/M2 | HEIGHT: 62 IN | WEIGHT: 148.2 LBS | SYSTOLIC BLOOD PRESSURE: 123 MMHG | RESPIRATION RATE: 16 BRPM

## 2019-01-11 LAB
ANION GAP SERPL CALCULATED.3IONS-SCNC: 13.8 MMOL/L
BASOPHILS # BLD AUTO: 0.04 10*3/MM3 (ref 0–0.2)
BASOPHILS NFR BLD AUTO: 0.6 % (ref 0–2)
BUN BLD-MCNC: 14 MG/DL (ref 6–20)
BUN/CREAT SERPL: 21.5 (ref 7–25)
CALCIUM SPEC-SCNC: 9 MG/DL (ref 8.6–10.5)
CHLORIDE SERPL-SCNC: 101 MMOL/L (ref 98–107)
CO2 SERPL-SCNC: 28.2 MMOL/L (ref 22–29)
CREAT BLD-MCNC: 0.65 MG/DL (ref 0.57–1)
DEPRECATED RDW RBC AUTO: 41.1 FL (ref 37–54)
EOSINOPHIL # BLD AUTO: 0.03 10*3/MM3 (ref 0.1–0.3)
EOSINOPHIL NFR BLD AUTO: 0.4 % (ref 0–4)
ERYTHROCYTE [DISTWIDTH] IN BLOOD BY AUTOMATED COUNT: 11.8 % (ref 11.5–14.5)
GFR SERPL CREATININE-BSD FRML MDRD: 103 ML/MIN/1.73
GLUCOSE BLD-MCNC: 118 MG/DL (ref 65–99)
HCT VFR BLD AUTO: 42.3 % (ref 37–47)
HGB BLD-MCNC: 14 G/DL (ref 12–16)
IMM GRANULOCYTES # BLD AUTO: 0.03 10*3/MM3 (ref 0–0.03)
IMM GRANULOCYTES NFR BLD AUTO: 0.4 % (ref 0–0.5)
LYMPHOCYTES # BLD AUTO: 1.29 10*3/MM3 (ref 0.6–4.8)
LYMPHOCYTES NFR BLD AUTO: 18.1 % (ref 20–45)
MCH RBC QN AUTO: 31.5 PG (ref 27–31)
MCHC RBC AUTO-ENTMCNC: 33.1 G/DL (ref 31–37)
MCV RBC AUTO: 95.1 FL (ref 81–99)
MONOCYTES # BLD AUTO: 0.39 10*3/MM3 (ref 0–1)
MONOCYTES NFR BLD AUTO: 5.5 % (ref 3–8)
NEUTROPHILS # BLD AUTO: 5.36 10*3/MM3 (ref 1.5–8.3)
NEUTROPHILS NFR BLD AUTO: 75 % (ref 45–70)
NRBC BLD AUTO-RTO: 0 /100 WBC (ref 0–0)
PLATELET # BLD AUTO: 274 10*3/MM3 (ref 140–500)
PMV BLD AUTO: 9.5 FL (ref 7.4–10.4)
POTASSIUM BLD-SCNC: 3.8 MMOL/L (ref 3.5–5.2)
RBC # BLD AUTO: 4.45 10*6/MM3 (ref 4.2–5.4)
SODIUM BLD-SCNC: 143 MMOL/L (ref 136–145)
WBC NRBC COR # BLD: 7.14 10*3/MM3 (ref 4.8–10.8)

## 2019-01-11 PROCEDURE — 80048 BASIC METABOLIC PNL TOTAL CA: CPT | Performed by: OBSTETRICS & GYNECOLOGY

## 2019-01-11 PROCEDURE — 93005 ELECTROCARDIOGRAM TRACING: CPT

## 2019-01-11 PROCEDURE — 93010 ELECTROCARDIOGRAM REPORT: CPT | Performed by: INTERNAL MEDICINE

## 2019-01-11 PROCEDURE — 36415 COLL VENOUS BLD VENIPUNCTURE: CPT | Performed by: OBSTETRICS & GYNECOLOGY

## 2019-01-11 PROCEDURE — 85025 COMPLETE CBC W/AUTO DIFF WBC: CPT | Performed by: OBSTETRICS & GYNECOLOGY

## 2019-01-11 RX ORDER — CITALOPRAM 20 MG/1
20 TABLET ORAL NIGHTLY
COMMUNITY
End: 2019-11-09

## 2019-01-11 RX ORDER — LIDOCAINE 50 MG/G
1 PATCH TOPICAL DAILY PRN
COMMUNITY
End: 2020-02-05

## 2019-01-11 NOTE — DISCHARGE INSTRUCTIONS
PRE-ADMISSION TESTING INSTRUCTIONS FOR ADULTS    Take these medications the morning of surgery with a small sip of water:      No aspirin, advil, aleve, ibuprofen, naproxen, diet pills, decongestants, or herbal/vitamins for a week prior to surgery.    General Instructions:    • Do not eat solid food after midnight the night before surgery.  No gum, mints, or hard candy after midnight the night before surgery.  • You may drink clear liquids the day of surgery up until 2 hours before your arrival time.  (until 8:30 am)  • Clear liquids are liquids you can see through. Nothing RED in color.    Plain water    Sports drinks  Sodas     Gelatin (Jell-O)  Fruit juices without pulp such as white grape juice and apple juice  Popsicles that contain no fruit or yogurt  Tea or coffee (no cream or milk added)    • It is beneficial for you to have a clear drink that contains carbohydrates just before you leave your house and before your fasting time begins.  We suggest a 20 ounce bottle of Gatorade or Powerade for non-diabetic patients or a 20 ounce bottle of G2 or Powerade Zero for diabetic patients.     • Patients who avoid smoking, chewing tobacco and alcohol for 4 weeks prior to surgery have a reduced risk of post-operative complications.  If at all possible, quit smoking as many days before surgery as you can.    • Do not smoke, use chewing tobacco or drink alcohol the day of surgery    • Bring your C-PAP/ BI-PAP machine if you use one.  • Wear clean comfortable clothes and socks.  • Do not wear contact lenses, lotion, deodorant, or make-up.  Bring a case for your glasses if applicable. You may brush your teeth the morning of surgery.  • You may wear dentures/partials, do not put adhesive/glue on them.  • Bring crutches or walker if applicable.  • Leave all other jewelry and valuables at home.      Preventing a Surgical Site Infection:    • Shower the night before and on the morning of surgery using the chlorhexidine soap you  were given.  Use a clean washcloth with the soap.  Place clean sheets on your bed after showering the night before surgery. Do not use the CHG soap on your hair, face, or private areas. Wash your body gently for five (5) minutes. Do not scrub your skin.  Dry with a clean towel and dress in clean clothing.    • Do not shave the surgical area for 10 days-2 weeks prior to surgery  because the razor can irritate skin and make it easier to develop an infection.  • Make sure you, your family, and all healthcare providers clean their hands with soap and water or an alcohol based hand  before caring for you or your wound.      Day of surgery:    Your surgeon’s office will advise you of your arrival time for the day of surgery.    Upon arrival, a Pre-op nurse and Anesthesia provider will review your health history, obtain vital signs, and answer questions you may have.  The only belongings needed at this time will be your home medications and if applicable your C-PAP/BI-PAP machine.  If you are staying overnight your family can leave the rest of your belongings in the car and bring them to your room later.  A Pre-op nurse will start an IV and you may receive medication in preparation for surgery, including something to help you relax.  Your family will be able to see you in the Pre-op area.  While you are in surgery your family should notify the waiting room  if they leave the waiting room area and provide a contact phone number.    IF you have any questions, you can call the Pre-Admission Department at [phone] or your surgeon's office.  Notify your surgeon if  you become sick, have a fever, productive cough, or cannot be here the day of surgery    Please be aware that surgery does come with discomfort.  We want to make every effort to control your discomfort so please discuss any uncontrolled symptoms with your nurse.   Your doctor will most likely have prescribed pain medications.      If you  are going home after surgery, you will receive individualized written care instructions before being discharged.  A responsible adult (over the age of 18) must drive you to and from the hospital on the day of your surgery and stay with you for 24 hours after anesthesia.    If you are staying overnight following surgery, you will be transported to your hospital room following the recovery period.  [facility] has all private rooms.    Deductibles and co-payments are collected on the day of service. Please be prepared to pay the required co-pay, deductible or deposit on the day of service as defined by your plan.    Hysteroscopy  Hysteroscopy is a procedure used for looking inside the womb (uterus). It may be done for various reasons, including:  · To evaluate abnormal bleeding, fibroid (benign, noncancerous) tumors, polyps, scar tissue (adhesions), and possibly cancer of the uterus.  · To look for lumps (tumors) and other uterine growths.  · To look for causes of why a woman cannot get pregnant (infertility), causes of recurrent loss of pregnancy (miscarriages), or a lost intrauterine device (IUD).  · To perform a sterilization by blocking the fallopian tubes from inside the uterus.  In this procedure, a thin, flexible tube with a tiny light and camera on the end of it (hysteroscope) is used to look inside the uterus. A hysteroscopy should be done right after a menstrual period to be sure you are not pregnant.  LET YOUR HEALTH CARE PROVIDER KNOW ABOUT:   · Any allergies you have.  · All medicines you are taking, including vitamins, herbs, eye drops, creams, and over-the-counter medicines.  · Previous problems you or members of your family have had with the use of anesthetics.  · Any blood disorders you have.  · Previous surgeries you have had.  · Medical conditions you have.  RISKS AND COMPLICATIONS   Generally, this is a safe procedure. However, as with any procedure, complications can occur. Possible  complications include:  · Putting a hole in the uterus.  · Excessive bleeding.  · Infection.  · Damage to the cervix.  · Injury to other organs.  · Allergic reaction to medicines.  · Too much fluid used in the uterus for the procedure.  BEFORE THE PROCEDURE   · Ask your health care provider about changing or stopping any regular medicines.  · Do not take aspirin or blood thinners for 1 week before the procedure, or as directed by your health care provider. These can cause bleeding.  · If you smoke, do not smoke for 2 weeks before the procedure.  · In some cases, a medicine is placed in the cervix the day before the procedure. This medicine makes the cervix have a larger opening (dilate). This makes it easier for the instrument to be inserted into the uterus during the procedure.  · Do not eat or drink anything for at least 8 hours before the surgery.  · Arrange for someone to take you home after the procedure.  PROCEDURE   · You may be given a medicine to relax you (sedative). You may also be given one of the following:  ¨ A medicine that numbs the area around the cervix (local anesthetic).  ¨ A medicine that makes you sleep through the procedure (general anesthetic).  · The hysteroscope is inserted through the vagina into the uterus. The camera on the hysteroscope sends a picture to a TV screen. This gives the surgeon a good view inside the uterus.  · During the procedure, a liquid is put into the uterus, which allows the surgeon to see better.  · Sometimes, tissue is gently scraped from inside the uterus. These tissue samples are sent to a lab for testing.  AFTER THE PROCEDURE   · If you had a general anesthetic, you may be groggy for a couple hours after the procedure.  · If you had a local anesthetic, you will be able to go home as soon as you are stable and feel ready.  · You may have some cramping. This normally lasts for a couple days.  · You may have bleeding, which varies from light spotting for a few days  to menstrual-like bleeding for 3-7 days. This is normal.  · If your test results are not back during the visit, make an appointment with your health care provider to find out the results.     This information is not intended to replace advice given to you by your health care provider. Make sure you discuss any questions you have with your health care provider.     Document Released: 2002 Document Revised: 10/08/2014 Document Reviewed: 2014  [employer]® Patient Information © [employer].    Dilation and Curettage or Vacuum Curettage  Dilation and curettage (D&C) and vacuum curettage are minor procedures. A D&C involves stretching (dilation) the cervix and scraping (curettage) the inside lining of the womb (uterus). During a D&C, tissue is gently scraped from the inside lining of the uterus. During a vacuum curettage, the lining and tissue in the uterus are removed with the use of gentle suction.   Curettage may be performed to either diagnose or treat a problem. As a diagnostic procedure, curettage is performed to examine tissues from the uterus. A diagnostic curettage may be performed for the following symptoms:   · Irregular bleeding in the uterus.    · Bleeding with the development of clots.    · Spotting between menstrual periods.    · Prolonged menstrual periods.    · Bleeding after menopause.    · No menstrual period (amenorrhea).    · A change in size and shape of the uterus.    As a treatment procedure, curettage may be performed for the following reasons:   · Removal of an IUD (intrauterine device).    · Removal of retained placenta after giving birth. Retained placenta can cause an infection or bleeding severe enough to require transfusions.    · .    · Miscarriage.    · Removal of polyps inside the uterus.    · Removal of uncommon types of noncancerous lumps (fibroids).    LET YOUR HEALTH CARE PROVIDER KNOW ABOUT:   · Any allergies you have.    · All medicines you are taking,  including vitamins, herbs, eye drops, creams, and over-the-counter medicines.    · Previous problems you or members of your family have had with the use of anesthetics.    · Any blood disorders you have.    · Previous surgeries you have had.    · Medical conditions you have.  RISKS AND COMPLICATIONS   Generally, this is a safe procedure. However, as with any procedure, complications can occur. Possible complications include:  · Excessive bleeding.    · Infection of the uterus.    · Damage to the cervix.    · Development of scar tissue (adhesions) inside the uterus, later causing abnormal amounts of menstrual bleeding.    · Complications from the general anesthetic, if a general anesthetic is used.    · Putting a hole (perforation) in the uterus. This is rare.    BEFORE THE PROCEDURE   · Eat and drink before the procedure only as directed by your health care provider.    · Arrange for someone to take you home.    PROCEDURE   This procedure usually takes about 15-30 minutes.  · You will be given one of the following:    A medicine that numbs the area in and around the cervix (local anesthetic).      A medicine to make you sleep through the procedure (general anesthetic).  · You will lie on your back with your legs in stirrups.    · A warm metal or plastic instrument (speculum) will be placed in your vagina to keep it open and to allow the health care provider to see the cervix.  · There are two ways in which your cervix can be softened and dilated. These include:      Taking a medicine.      Having thin rods (laminaria) inserted into your cervix.    · A curved tool (curette) will be used to scrape cells from the inside lining of the uterus. In some cases, gentle suction is applied with the curette. The curette will then be removed.    AFTER THE PROCEDURE   · You will rest in the recovery area until you are stable and are ready to go home.    · You may feel sick to your stomach (nauseous) or throw up (vomit) if you  were given a general anesthetic.    · You may have a sore throat if a tube was placed in your throat during general anesthesia.    · You may have light cramping and bleeding. This may last for 2 days to 2 weeks after the procedure.    · Your uterus needs to make a new lining after the procedure. This may make your next period late.     This information is not intended to replace advice given to you by your health care provider. Make sure you discuss any questions you have with your health care provider.     Document Released: 12/18/2006 Document Revised: 08/20/2014 Document Reviewed: 07/17/2014  [employer]® Patient Information ©2016 [employer].

## 2019-01-11 NOTE — PAT
Pt here for PAT visit.  Pre-op tests completed, chg soap given, and instructions reviewed.  Instructed clears until 8:30m am dos, voiced understanding.  Pt states has had m/s chest pain recently w/anxiety and activity.  Denies soa, states if drinks soda w/ it she gets nauseated.  States she thinks it is either anxiety or reflux related.  Instructed to discuss w/PCP and that PAT RN would have anesthesia review her history to see if anything further is needed prior to procedure.  Voiced understanding.

## 2019-01-11 NOTE — PAT
Pt in PAT c/o chest pain w/ occ nausea.  H/O cardiomyopathy post partum . EF 25% improved to 50% 2016, to follow up w/ Dr Ortiz after 1 year, not done.  Recommend follow up w/ Dr Ortiz for cardiac clearance prior to elective surgery.

## 2019-01-13 ENCOUNTER — RESULTS ENCOUNTER (OUTPATIENT)
Dept: OBSTETRICS AND GYNECOLOGY | Facility: CLINIC | Age: 39
End: 2019-01-13

## 2019-01-13 DIAGNOSIS — Z90.79 H/O BILATERAL SALPINGECTOMY: ICD-10-CM

## 2019-01-13 DIAGNOSIS — N92.1 MENORRHAGIA WITH IRREGULAR CYCLE: ICD-10-CM

## 2019-01-13 DIAGNOSIS — Z97.5 IUD CONTRACEPTION: ICD-10-CM

## 2019-01-14 ENCOUNTER — HOSPITAL ENCOUNTER (EMERGENCY)
Facility: HOSPITAL | Age: 39
Discharge: HOME | End: 2019-01-15
Attending: EMERGENCY MEDICINE | Admitting: EMERGENCY MEDICINE

## 2019-01-14 ENCOUNTER — APPOINTMENT (OUTPATIENT)
Dept: GENERAL RADIOLOGY | Facility: HOSPITAL | Age: 39
End: 2019-01-14

## 2019-01-14 VITALS
DIASTOLIC BLOOD PRESSURE: 86 MMHG | HEIGHT: 62 IN | BODY MASS INDEX: 27.05 KG/M2 | TEMPERATURE: 98.2 F | WEIGHT: 147 LBS | HEART RATE: 76 BPM | RESPIRATION RATE: 16 BRPM | SYSTOLIC BLOOD PRESSURE: 121 MMHG | OXYGEN SATURATION: 100 %

## 2019-01-14 DIAGNOSIS — R07.9 CHEST PAIN, UNSPECIFIED TYPE: Primary | ICD-10-CM

## 2019-01-14 LAB
ALBUMIN SERPL-MCNC: 4.6 G/DL (ref 3.5–5.2)
ALBUMIN/GLOB SERPL: 1.5 G/DL
ALP SERPL-CCNC: 121 U/L (ref 40–129)
ALT SERPL W P-5'-P-CCNC: 23 U/L (ref 5–33)
ANION GAP SERPL CALCULATED.3IONS-SCNC: 9.3 MMOL/L
AST SERPL-CCNC: 19 U/L (ref 5–32)
BASOPHILS # BLD AUTO: 0.04 10*3/MM3 (ref 0–0.2)
BASOPHILS NFR BLD AUTO: 0.5 % (ref 0–2)
BILIRUB SERPL-MCNC: 0.5 MG/DL (ref 0.2–1.2)
BUN BLD-MCNC: 10 MG/DL (ref 6–20)
BUN/CREAT SERPL: 12.7 (ref 7–25)
CALCIUM SPEC-SCNC: 9.7 MG/DL (ref 8.6–10.5)
CHLORIDE SERPL-SCNC: 104 MMOL/L (ref 98–107)
CO2 SERPL-SCNC: 25.7 MMOL/L (ref 22–29)
CREAT BLD-MCNC: 0.79 MG/DL (ref 0.57–1)
DEPRECATED RDW RBC AUTO: 39.4 FL (ref 37–54)
EOSINOPHIL # BLD AUTO: 0.03 10*3/MM3 (ref 0.1–0.3)
EOSINOPHIL NFR BLD AUTO: 0.4 % (ref 0–4)
ERYTHROCYTE [DISTWIDTH] IN BLOOD BY AUTOMATED COUNT: 11.7 % (ref 11.5–14.5)
GFR SERPL CREATININE-BSD FRML MDRD: 82 ML/MIN/1.73
GLOBULIN UR ELPH-MCNC: 3 GM/DL
GLUCOSE BLD-MCNC: 97 MG/DL (ref 65–99)
HCT VFR BLD AUTO: 40.6 % (ref 37–47)
HGB BLD-MCNC: 14 G/DL (ref 12–16)
IMM GRANULOCYTES # BLD AUTO: 0.02 10*3/MM3 (ref 0–0.03)
IMM GRANULOCYTES NFR BLD AUTO: 0.2 % (ref 0–0.5)
LYMPHOCYTES # BLD AUTO: 1.97 10*3/MM3 (ref 0.6–4.8)
LYMPHOCYTES NFR BLD AUTO: 23.2 % (ref 20–45)
MCH RBC QN AUTO: 32 PG (ref 27–31)
MCHC RBC AUTO-ENTMCNC: 34.5 G/DL (ref 31–37)
MCV RBC AUTO: 92.7 FL (ref 81–99)
MONOCYTES # BLD AUTO: 0.47 10*3/MM3 (ref 0–1)
MONOCYTES NFR BLD AUTO: 5.5 % (ref 3–8)
NEUTROPHILS # BLD AUTO: 5.95 10*3/MM3 (ref 1.5–8.3)
NEUTROPHILS NFR BLD AUTO: 70.2 % (ref 45–70)
NRBC BLD AUTO-RTO: 0 /100 WBC (ref 0–0)
PLATELET # BLD AUTO: 307 10*3/MM3 (ref 140–500)
PMV BLD AUTO: 9.2 FL (ref 7.4–10.4)
POTASSIUM BLD-SCNC: 3.9 MMOL/L (ref 3.5–5.2)
PROT SERPL-MCNC: 7.6 G/DL (ref 6–8.5)
RBC # BLD AUTO: 4.38 10*6/MM3 (ref 4.2–5.4)
SODIUM BLD-SCNC: 139 MMOL/L (ref 136–145)
TROPONIN T SERPL-MCNC: <0.01 NG/ML (ref 0–0.03)
WBC NRBC COR # BLD: 8.48 10*3/MM3 (ref 4.8–10.8)

## 2019-01-14 PROCEDURE — 93010 ELECTROCARDIOGRAM REPORT: CPT | Performed by: INTERNAL MEDICINE

## 2019-01-14 PROCEDURE — 71046 X-RAY EXAM CHEST 2 VIEWS: CPT

## 2019-01-14 PROCEDURE — 93005 ELECTROCARDIOGRAM TRACING: CPT | Performed by: EMERGENCY MEDICINE

## 2019-01-14 PROCEDURE — 84484 ASSAY OF TROPONIN QUANT: CPT | Performed by: EMERGENCY MEDICINE

## 2019-01-14 PROCEDURE — 85025 COMPLETE CBC W/AUTO DIFF WBC: CPT | Performed by: EMERGENCY MEDICINE

## 2019-01-14 PROCEDURE — 99283 EMERGENCY DEPT VISIT LOW MDM: CPT

## 2019-01-14 PROCEDURE — 36415 COLL VENOUS BLD VENIPUNCTURE: CPT | Performed by: EMERGENCY MEDICINE

## 2019-01-14 PROCEDURE — 99284 EMERGENCY DEPT VISIT MOD MDM: CPT | Performed by: EMERGENCY MEDICINE

## 2019-01-14 PROCEDURE — 80053 COMPREHEN METABOLIC PANEL: CPT | Performed by: EMERGENCY MEDICINE

## 2019-01-14 RX ORDER — ALUMINA, MAGNESIA, AND SIMETHICONE 2400; 2400; 240 MG/30ML; MG/30ML; MG/30ML
15 SUSPENSION ORAL ONCE
Status: COMPLETED | OUTPATIENT
Start: 2019-01-14 | End: 2019-01-14

## 2019-01-14 RX ADMIN — LIDOCAINE HYDROCHLORIDE 15 ML: 20 SOLUTION ORAL; TOPICAL at 23:28

## 2019-01-14 RX ADMIN — ALUMINUM HYDROXIDE, MAGNESIUM HYDROXIDE, AND DIMETHICONE 15 ML: 400; 400; 40 SUSPENSION ORAL at 23:28

## 2019-01-15 NOTE — ED PROVIDER NOTES
Subjective   History of Present Illness  History of Present Illness    Chief complaint: Chest pain    Location: Central and left-sided chest    Quality/Severity:  Moderate sharp pains    Timing/Duration: Persistent for the past 5 days    Modifying Factors: Sometimes relieved with Tums    Narrative: This patient presents for evaluation of persistent chest pain symptoms.  She says that over the past 5 days she has had some recurrent sharp pains in the central and left-sided part of her chest.  It does not radiate to the arm or to the back or to the neck or jaw areas.  She denies any cough or cold type symptoms lately.  She says she feels a little bit shortness of breath with it.  She has not had any nausea or vomiting or diarrhea.  She says that she thought it was just some indigestion earlier this week and she took some Tums medication.  That seemed to relieve it temporarily.  However, recently it is not relieving it as much.  The chest pain symptom has made her feel anxious at times.  She notes that she recently changed medications from Lexapro to Celexa about one month ago.  She is not a smoker.  Her mother is  from complications of kidney failure and congestive heart failure.  Her father is still living but she does not know much about his medical history.  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    Associated Symptoms: As above    Review of Systems   Constitutional: Negative for activity change, diaphoresis and fever.   HENT: Negative.  Negative for congestion and facial swelling.    Eyes: Negative for pain and visual disturbance.   Respiratory: Positive for shortness of breath. Negative for cough and stridor.    Cardiovascular: Positive for chest pain. Negative for palpitations and leg swelling.   Gastrointestinal: Negative for abdominal pain,  diarrhea, nausea and vomiting.   Genitourinary: Negative for dysuria, flank pain and urgency.   Musculoskeletal: Negative for myalgias.   Skin: Negative for color change and rash.   Neurological: Negative for syncope, weakness and headaches.   All other systems reviewed and are negative.      Past Medical History:   Diagnosis Date   • Anxiety    • Asthma    • Chest pain, midsternal     states w/anxiety & activity, relieved w/tums, not sure if reflux or d/t anxiety   • Depression    • History of acquired CHF (congestive heart failure) 2015    with/after pregnancy   • History of gestational hypertension    • History of transfusion 2015    after delivery   • Hx gestational diabetes    • Low back pain     right, h/o slipped disc   • Personal history of cardiomyopathy 2015    w/pregnancy, states treated by Dr Ortiz at [facility]       No Known Allergies    Past Surgical History:   Procedure Laterality Date   •  SECTION      x3   • CHIN IMPLANT INSERTION      w/jaw surgery   • TUBAL ABDOMINAL LIGATION         Family History   Problem Relation Age of Onset   • Breast cancer Mother    • Kidney failure Mother    • Heart disease Mother         chf   • Diabetes Mother    • Asthma Father    • Sleep apnea Father    • Malig Hyperthermia Neg Hx        Social History     Socioeconomic History   • Marital status:      Spouse name: Not on file   • Number of children: Not on file   • Years of education: Not on file   • Highest education level: Not on file   Tobacco Use   • Smoking status: Former Smoker     Years: 15.00     Types: Cigarettes     Last attempt to quit: 2015     Years since quittin.0   • Smokeless tobacco: Never Used   • Tobacco comment: states not sure how much she smoked   Substance and Sexual Activity   • Alcohol use: No   • Drug use: No   • Sexual activity: Defer     ED Triage Vitals [19 2237]   Temp Heart Rate Resp BP SpO2   98.2 °F (36.8 °C) 76 16 121/86 100 %      Temp src Heart Rate  Source Patient Position BP Location FiO2 (%)   Oral Monitor Sitting Right arm --           Objective   Physical Exam   Constitutional: She is oriented to person, place, and time. She appears well-developed and well-nourished.  Non-toxic appearance. She does not appear ill. No distress.   HENT:   Head: Normocephalic and atraumatic.   Eyes: EOM are normal. Pupils are equal, round, and reactive to light. Right eye exhibits no discharge. Left eye exhibits no discharge.   Neck: Normal range of motion. Neck supple.   Cardiovascular: Normal rate, regular rhythm and normal pulses. Exam reveals no gallop.   No murmur heard.  Pulmonary/Chest: Effort normal. No accessory muscle usage or stridor. No tachypnea. No respiratory distress. She has no decreased breath sounds. She has no wheezes. She has no rhonchi. She has no rales.   Musculoskeletal: Normal range of motion. She exhibits no edema or deformity.        Right lower leg: Normal. She exhibits no tenderness and no edema.        Left lower leg: Normal. She exhibits no tenderness and no edema.   Neurological: She is alert and oriented to person, place, and time. She is not disoriented.   Skin: Skin is warm and dry. No ecchymosis and no rash noted. She is not diaphoretic. No erythema. No pallor.   Psychiatric: She has a normal mood and affect. Her behavior is normal. Judgment and thought content normal. Her mood appears not anxious. She is not agitated.   Nursing note and vitals reviewed.    EKG           EKG time/Interp time: 2258/2258  Rhythm/Rate: sinus rhythm, 74 bpm  P waves and NM: P waves present, 128 ms  QRS, axis: 91 ms, normal axis   ST and T waves: No acute ischemic changes notable     Independently interpreted by me contemporaneously with treatment    Results for orders placed or performed during the hospital encounter of 01/14/19   Comprehensive Metabolic Panel   Result Value Ref Range    Glucose 97 65 - 99 mg/dL    BUN 10 6 - 20 mg/dL    Creatinine 0.79 0.57 -  1.00 mg/dL    Sodium 139 136 - 145 mmol/L    Potassium 3.9 3.5 - 5.2 mmol/L    Chloride 104 98 - 107 mmol/L    CO2 25.7 22.0 - 29.0 mmol/L    Calcium 9.7 8.6 - 10.5 mg/dL    Total Protein 7.6 6.0 - 8.5 g/dL    Albumin 4.60 3.50 - 5.20 g/dL    ALT (SGPT) 23 5 - 33 U/L    AST (SGOT) 19 5 - 32 U/L    Alkaline Phosphatase 121 40 - 129 U/L    Total Bilirubin 0.5 0.2 - 1.2 mg/dL    eGFR Non African Amer 82 >60 mL/min/1.73    Globulin 3.0 gm/dL    A/G Ratio 1.5 g/dL    BUN/Creatinine Ratio 12.7 7.0 - 25.0    Anion Gap 9.3 mmol/L   Troponin   Result Value Ref Range    Troponin T <0.010 0.000 - 0.030 ng/mL   CBC Auto Differential   Result Value Ref Range    WBC 8.48 4.80 - 10.80 10*3/mm3    RBC 4.38 4.20 - 5.40 10*6/mm3    Hemoglobin 14.0 12.0 - 16.0 g/dL    Hematocrit 40.6 37.0 - 47.0 %    MCV 92.7 81.0 - 99.0 fL    MCH 32.0 (H) 27.0 - 31.0 pg    MCHC 34.5 31.0 - 37.0 g/dL    RDW 11.7 11.5 - 14.5 %    RDW-SD 39.4 37.0 - 54.0 fl    MPV 9.2 7.4 - 10.4 fL    Platelets 307 140 - 500 10*3/mm3    Neutrophil % 70.2 (H) 45.0 - 70.0 %    Lymphocyte % 23.2 20.0 - 45.0 %    Monocyte % 5.5 3.0 - 8.0 %    Eosinophil % 0.4 0.0 - 4.0 %    Basophil % 0.5 0.0 - 2.0 %    Immature Grans % 0.2 0.0 - 0.5 %    Neutrophils, Absolute 5.95 1.50 - 8.30 10*3/mm3    Lymphocytes, Absolute 1.97 0.60 - 4.80 10*3/mm3    Monocytes, Absolute 0.47 0.00 - 1.00 10*3/mm3    Eosinophils, Absolute 0.03 (L) 0.10 - 0.30 10*3/mm3    Basophils, Absolute 0.04 0.00 - 0.20 10*3/mm3    Immature Grans, Absolute 0.02 0.00 - 0.03 10*3/mm3    nRBC 0.0 0.0 - 0.0 /100 WBC         RADIOLOGY        Study: Chest x-ray    Findings: No acute findings    Interpreted contemporaneously with treatment by myself, independently viewed by me        Procedures           ED Course  ED Course as of Kishore 15 0056   Tue Kishore 15, 2019   0055 I have reviewed the EKG and labs and chest x-ray from today's visit.  Everything is quite reassuring here today.  The patient's physical exam is not worrisome  "at all.  She is very low risk for acute coronary syndrome or PE or dissection or any other cardiopulmonary emergency condition.  I think she can be discharged home, apparently now without further observation or testing at this time.  She has an appointment already scheduled to meet with a cardiologist in one week for preoperative evaluation.  I advised her to discuss her symptoms with the cardiologist at that time if they have not completely resolved.  I also reviewed with her the usual \"return to ER\" instructions for any worsening signs or symptoms should they occur.  Patient discharged home in good condition after that.  [JERRICA]      ED Course User Index  [JERRICA] Ranjit Sommer MD                  MDM  Number of Diagnoses or Management Options     Amount and/or Complexity of Data Reviewed  Clinical lab tests: ordered and reviewed  Tests in the radiology section of CPT®: reviewed and ordered  Decide to obtain previous medical records or to obtain history from someone other than the patient: yes  Review and summarize past medical records: yes  Independent visualization of images, tracings, or specimens: yes    Risk of Complications, Morbidity, and/or Mortality  Presenting problems: high  Diagnostic procedures: moderate  Management options: moderate          Final diagnoses:   Chest pain, unspecified type            Ranjit Sommer MD  01/15/19 0056    "

## 2019-01-15 NOTE — DISCHARGE INSTRUCTIONS
Please return to the emergency room for any worsening pain,, fevers, weakness, dizziness, difficulties breathing or any other concerns.

## 2019-01-22 ENCOUNTER — OFFICE VISIT (OUTPATIENT)
Dept: CARDIOLOGY | Facility: CLINIC | Age: 39
End: 2019-01-22

## 2019-01-22 VITALS
HEART RATE: 69 BPM | WEIGHT: 145 LBS | HEIGHT: 62 IN | DIASTOLIC BLOOD PRESSURE: 84 MMHG | BODY MASS INDEX: 26.68 KG/M2 | SYSTOLIC BLOOD PRESSURE: 120 MMHG

## 2019-01-22 DIAGNOSIS — Z01.810 PREOP CARDIOVASCULAR EXAM: Primary | ICD-10-CM

## 2019-01-22 DIAGNOSIS — R06.02 SOB (SHORTNESS OF BREATH): ICD-10-CM

## 2019-01-22 DIAGNOSIS — R07.2 PRECORDIAL PAIN: ICD-10-CM

## 2019-01-22 DIAGNOSIS — R94.31 ABNORMAL ECG: ICD-10-CM

## 2019-01-22 PROCEDURE — S0260 H&P FOR SURGERY: HCPCS | Performed by: OBSTETRICS & GYNECOLOGY

## 2019-01-22 PROCEDURE — 93000 ELECTROCARDIOGRAM COMPLETE: CPT | Performed by: INTERNAL MEDICINE

## 2019-01-22 PROCEDURE — 99204 OFFICE O/P NEW MOD 45 MIN: CPT | Performed by: INTERNAL MEDICINE

## 2019-01-22 NOTE — PROGRESS NOTES
Subjective:     Encounter Date:01/22/2019      Patient ID: Talisha Zepeda is a 38 y.o. female.    Chief Complaint: SOB, CP, preop cards  History of Present Illness    Dear Dr. Tabares,    I had the pleasure seeing this patient in the office today.  She comes in for evaluation chest discomfort, shortness breath, and for preoperative assessment prior to an ablation that is scheduled to be performed on Thursday of this week.    She has a history of peripartum cardiomyopathy.  She is typically followed with Dr. Ortiz of [facility], although she last saw him in September 2017.  She has a history of peripartum cardiomyopathy and her initial ejection fraction in 2015 was 25%.  She was seen by Dr. Ortiz in August 2017; at that time she was feeling fine.  She had a transthoracic echocardiogram performed at [facility] on September 5, 2017.  It showed an ejection fraction 45-50%.  Left trigger size was normal.  No significant valvular abnormality was seen.  She previously been on guideline directed medical therapy and had been on carvedilol, afterload reduction, spironolactone, and digoxin.  She states that all this was discontinued after the echocardiogram 2017.    Recently she has had increasing complaints of dyspnea on exertion.  She states that she does anything that she gets very short of breath.  Has been most marked over the last 2 months.  Additionally, she's also been getting mid precordial chest discomfort when she gets shortness of breath.  No shortness breath or chest pain at rest.  She has not had any orthopnea or PND.    It appears from the records that she never had an ischemic evaluation.  She states that she never had a stress test or cardiac catheterization.  She denies any lower extremity edema.  She is not any chills or fevers.  No cough.    She was in the emergency room on January 14 because coins a chest pain and shortness of breath.  Evaluation was unremarkable, with EKG, troponin, and  chest x-ray looking okay.  Nitrate peptide was not assessed.    The following portions of the patient's history were reviewed and updated as appropriate: allergies, current medications, past family history, past medical history, past social history, past surgical history and problem list.    Past Medical History:   Diagnosis Date   • Anxiety    • Asthma    • Chest pain, midsternal     states w/anxiety & activity, relieved w/tums, not sure if reflux or d/t anxiety   • Depression    • History of acquired CHF (congestive heart failure) 2015    with/after pregnancy   • History of gestational hypertension    • History of transfusion 2015    after delivery   • Hx gestational diabetes    • Low back pain     right, h/o slipped disc   • Personal history of cardiomyopathy 2015    w/pregnancy, states treated by Dr Ortiz at [facility]       Past Surgical History:   Procedure Laterality Date   •  SECTION      x3   • CHIN IMPLANT INSERTION      w/jaw surgery   • TUBAL ABDOMINAL LIGATION         Social History     Socioeconomic History   • Marital status:      Spouse name: Not on file   • Number of children: Not on file   • Years of education: Not on file   • Highest education level: Not on file   Social Needs   • Financial resource strain: Not on file   • Food insecurity - worry: Not on file   • Food insecurity - inability: Not on file   • Transportation needs - medical: Not on file   • Transportation needs - non-medical: Not on file   Occupational History   • Not on file   Tobacco Use   • Smoking status: Former Smoker     Years: 15.00     Types: Cigarettes     Last attempt to quit: 2015     Years since quittin.0   • Smokeless tobacco: Never Used   • Tobacco comment: states not sure how much she smoked   Substance and Sexual Activity   • Alcohol use: No     Comment: caff use   • Drug use: No   • Sexual activity: Defer   Other Topics Concern   • Not on file   Social History Narrative   • Not on file  "      Review of Systems   Constitution: Negative for chills, decreased appetite, fever and night sweats.   HENT: Negative for ear discharge, ear pain, hearing loss, nosebleeds and sore throat.    Eyes: Negative for blurred vision, double vision and pain.   Cardiovascular: Negative for cyanosis.   Respiratory: Negative for hemoptysis and sputum production.    Endocrine: Negative for cold intolerance and heat intolerance.   Hematologic/Lymphatic: Negative for adenopathy.   Skin: Negative for dry skin, itching, nail changes, rash and suspicious lesions.   Musculoskeletal: Negative for arthritis, gout, muscle cramps, muscle weakness, myalgias and neck pain.   Gastrointestinal: Negative for anorexia, bowel incontinence, constipation, diarrhea, dysphagia, hematemesis and jaundice.   Genitourinary: Negative for bladder incontinence, dysuria, flank pain, frequency, hematuria and nocturia.   Neurological: Negative for focal weakness, numbness, paresthesias and seizures.   Psychiatric/Behavioral: Negative for altered mental status, hallucinations, hypervigilance, suicidal ideas and thoughts of violence.   Allergic/Immunologic: Negative for persistent infections.         ECG 12 Lead  Date/Time: 1/22/2019 9:26 AM  Performed by: Tolu Apodaca III, MD  Authorized by: Tolu Apodaca III, MD   Comparison: compared with previous ECG   Similar to previous ECG  Rhythm: sinus rhythm  Rate: normal  Conduction: conduction normal  ST Depression: V4, V5 and V6  T Waves: T waves normal  QRS axis: normal  Other: no other findings  Clinical impression: abnormal ECG               Objective:     Vitals:    01/22/19 0840   BP: 120/84   Pulse: 69   Weight: 65.8 kg (145 lb)   Height: 157.5 cm (62\")         Physical Exam   Constitutional: She is oriented to person, place, and time. She appears well-developed and well-nourished. No distress.   HENT:   Head: Normocephalic and atraumatic.   Nose: Nose normal.   Mouth/Throat: Oropharynx is clear and " moist.   Eyes: Conjunctivae and EOM are normal. Pupils are equal, round, and reactive to light. Right eye exhibits no discharge. Left eye exhibits no discharge.   Neck: Normal range of motion. Neck supple. No tracheal deviation present. No thyromegaly present.   Cardiovascular: Normal rate, regular rhythm, S1 normal, S2 normal, normal heart sounds and normal pulses. Exam reveals no S3.   Pulmonary/Chest: Effort normal and breath sounds normal. No stridor. No respiratory distress. She exhibits no tenderness.   Abdominal: Soft. Bowel sounds are normal. She exhibits no distension and no mass. There is no tenderness. There is no rebound and no guarding.   Musculoskeletal: Normal range of motion. She exhibits no tenderness or deformity.   Lymphadenopathy:     She has no cervical adenopathy.   Neurological: She is alert and oriented to person, place, and time. She has normal reflexes.   Skin: Skin is warm and dry. No rash noted. She is not diaphoretic. No erythema.   Psychiatric: She has a normal mood and affect. Thought content normal.       Lab Review:             Performed        Assessment:          Diagnosis Plan   1. Preop cardiovascular exam  Adult Stress Echo W/ Cont or Stress Agent if Necessary Per Protocol   2. Peripartum cardiomyopathy  Adult Stress Echo W/ Cont or Stress Agent if Necessary Per Protocol   3. SOB (shortness of breath)  Adult Stress Echo W/ Cont or Stress Agent if Necessary Per Protocol   4. Precordial pain  Adult Stress Echo W/ Cont or Stress Agent if Necessary Per Protocol   5. Abnormal ECG  Adult Stress Echo W/ Cont or Stress Agent if Necessary Per Protocol          Plan:       1.  Shortness of breath-etiology unclear.  Certainly, given her history of pericardium cardiomyopathy, and since the symptoms have been coming on recently with her being off all medicines, we need to reassess her ejection fraction.  Additionally, she's having exertional chest discomfort along with shortness of breath,  and it appears that she never had an ischemic evaluation.  EKG is abnormal but unchanged.  We are going to set her up for stress echocardiogram.  We will plan on this being performed today.  2.  Peripartum cardiomyopathy-we will await the echocardiographic portion of the stress echocardiogram to reassess ventricular function  3.  Assessment of cardiac risk for the anticipated surgical procedure-we will of course await the results of stress echocardiogram, although her cardiac risk for an ablation appears that it would be low.    Thank you very much for allowing us to participate in the care of this pleasant patient.  Please don't hesitate to call if I can be of assistance in any way.      Current Outpatient Medications:   •  citalopram (CeleXA) 20 MG tablet, Take 20 mg by mouth Every Night., Disp: , Rfl:   •  lidocaine (LIDODERM) 5 %, Place 1 patch on the skin as directed by provider Daily As Needed for Mild Pain  (back pain). Remove & Discard patch within 12 hours or as directed by MD, Disp: , Rfl:   •  TRAZODONE HCL PO, Take 100 mg by mouth At Night As Needed., Disp: , Rfl:

## 2019-01-22 NOTE — H&P
PREOPERATIVE HISTORY AND PHYSICAL      Patient Care Team:  Ana Laura Ramos APRN as PCP - General (Family Medicine)    Chief complaint: Menorrhagia with irregular cycle    Pt is a 38 y.o. No obstetric history on file.  No LMP recorded.     HPI:Chronic menometrorrhagia refractory to IUD.  Hx ovarian cysts, resolved.  Hx tubal.  Pt desires ablation and IUD removal. Pt has had 3 Csections and denies a hx of classical csection. Pt has hx peripartum cardiomyopathy.        PMHx:   Past Medical History:   Diagnosis Date   • Anxiety    • Asthma    • Chest pain, midsternal     states w/anxiety & activity, relieved w/tums, not sure if reflux or d/t anxiety   • Depression    • History of acquired CHF (congestive heart failure)     with/after pregnancy   • History of gestational hypertension    • History of transfusion 2015    after delivery   • Hx gestational diabetes    • Low back pain     right, h/o slipped disc   • Personal history of cardiomyopathy     w/pregnancy, states treated by Dr Ortiz at [facility]       Current problem list:  Patient Active Problem List   Diagnosis   • Peripartum cardiomyopathy   • Bilateral ovarian cysts   • Pelvic pain in female   • H/O bilateral salpingectomy   • IUD contraception-Mirena   • HPV in female   • Menorrhagia with irregular cycle   • History of 3  sections: pt denies any classical sections       PSHx:   Past Surgical History:   Procedure Laterality Date   •  SECTION      x3   • CHIN IMPLANT INSERTION      w/jaw surgery   • TUBAL ABDOMINAL LIGATION         Social Hx:   Social History     Socioeconomic History   • Marital status:      Spouse name: Not on file   • Number of children: Not on file   • Years of education: Not on file   • Highest education level: Not on file   Social Needs   • Financial resource strain: Not on file   • Food insecurity - worry: Not on file   • Food insecurity - inability: Not on file   • Transportation needs - medical: Not on  file   • Transportation needs - non-medical: Not on file   Occupational History   • Not on file   Tobacco Use   • Smoking status: Former Smoker     Years: 15.00     Types: Cigarettes     Last attempt to quit: 2015     Years since quittin.0   • Smokeless tobacco: Never Used   • Tobacco comment: states not sure how much she smoked   Substance and Sexual Activity   • Alcohol use: No     Comment: caff use   • Drug use: No   • Sexual activity: Defer   Other Topics Concern   • Not on file   Social History Narrative   • Not on file       FHx:   Family History   Problem Relation Age of Onset   • Breast cancer Mother    • Kidney failure Mother    • Diabetes Mother    • Heart failure Mother    • Hypertension Mother    • Asthma Father    • Sleep apnea Father    • Heart failure Maternal Grandfather    • Malig Hyperthermia Neg Hx        Debilities/Disabilities Identified: None    Emotional Behavior: Appropriate    PGyn Hx:  otherwise noncontributory    POBHx:   Obstetric History     No data available          Allergies: Patient has no known allergies.    Medications:   No medications prior to admission.                            No current facility-administered medications for this encounter.     Current Outpatient Medications:   •  citalopram (CeleXA) 20 MG tablet, Take 20 mg by mouth Every Night., Disp: , Rfl:   •  lidocaine (LIDODERM) 5 %, Place 1 patch on the skin as directed by provider Daily As Needed for Mild Pain  (back pain). Remove & Discard patch within 12 hours or as directed by MD, Disp: , Rfl:   •  TRAZODONE HCL PO, Take 100 mg by mouth At Night As Needed., Disp: , Rfl:         Review of Systems   Constitutional: Negative.    HENT: Negative.    Eyes: Negative.    Respiratory: Negative.    Cardiovascular: Negative.    Gastrointestinal: Negative.    Endocrine: Negative.    Genitourinary: Positive for menstrual problem.   Musculoskeletal: Negative.    Skin: Negative.    Allergic/Immunologic: Negative.     Neurological: Negative.    Hematological: Negative.    Psychiatric/Behavioral: Negative.        Vital Signs  There were no vitals taken for this visit.    Physical Exam   Constitutional: She is oriented to person, place, and time. She appears well-developed and well-nourished.   HENT:   Head: Normocephalic and atraumatic.   Neck: Normal range of motion. Neck supple. No thyromegaly present.   Cardiovascular: Normal rate and regular rhythm.   Pulmonary/Chest: Effort normal and breath sounds normal. Right breast exhibits no mass and no nipple discharge. Left breast exhibits no mass and no nipple discharge. Breasts are symmetrical. There is no breast swelling.   Abdominal: Soft. Bowel sounds are normal. She exhibits no distension and no mass. There is no tenderness. There is no rebound and no guarding.   Genitourinary: Vagina normal and uterus normal. No breast tenderness, discharge or bleeding. Pelvic exam was performed with patient prone. There is no lesion on the right labia. There is no lesion on the left labia. Cervix exhibits no motion tenderness and no discharge. Right adnexum displays no mass. Left adnexum displays no mass.   Musculoskeletal: Normal range of motion. She exhibits no edema.   Neurological: She is alert and oriented to person, place, and time.   Skin: Skin is warm and dry.   Psychiatric: She has a normal mood and affect. Her behavior is normal. Judgment and thought content normal.   Nursing note and vitals reviewed.      IMPRESSION:    Menorrhagia with irregular cycle      PLAN:    Procedure(s):  INTRAUTERINE DEVICE REMOVAL  DIAGNOSTIC HYSTEROSCOPY, DILATATION AND CURETTAGE, NOVASURE ENDOMETRIAL ABLATION.    RISKS, ALTERNATIVES, COMPLICATIONS OF THE PROCEDURE INCLUDING BUT NOT LIMITED TO:    INTRAOPERATIVE RISKS: INJURY TO INTERNAL ORGANS (BOWEL, BLADDER, URETER,BLOOD VESSELS) OR HEMORRHAGE REQUIRING FURTHER SURGERY, INFECTION, AND DEATH;   POSTOP COMPLICATIONS: BLEEDING, INFECTION, PNEUMONIA,  PULMONARY EMBOLISM, AND DEATH;   WERE EXPLAINED TO THE PT WHO VERBALIZED HER UNDERSTANDING.        I discussed the patients findings and my recommendations with patient.     Elvis Andre MD  01/22/19  1:04 PM

## 2019-01-23 ENCOUNTER — TELEPHONE (OUTPATIENT)
Dept: CARDIOLOGY | Facility: CLINIC | Age: 39
End: 2019-01-23

## 2019-01-23 NOTE — TELEPHONE ENCOUNTER
Yumiko from Dr. Andre's office called and stated that pt was schedule for an ablation tomorrow. Your OV note did say that she would be at low cardiac risk for procedure but would await results of her stress echo. Pt cancelled the echo and it was r/s for 1/28.Should they cancel the procedure until after the echo or can they go ahead as planned?

## 2019-01-24 ENCOUNTER — HOSPITAL ENCOUNTER (OUTPATIENT)
Facility: HOSPITAL | Age: 39
Setting detail: HOSPITAL OUTPATIENT SURGERY
Discharge: HOME | End: 2019-01-24
Attending: OBSTETRICS & GYNECOLOGY | Admitting: OBSTETRICS & GYNECOLOGY

## 2019-01-24 ENCOUNTER — ANESTHESIA (OUTPATIENT)
Dept: PERIOP | Facility: HOSPITAL | Age: 39
End: 2019-01-24

## 2019-01-24 VITALS
SYSTOLIC BLOOD PRESSURE: 122 MMHG | WEIGHT: 142.8 LBS | TEMPERATURE: 97.5 F | HEART RATE: 85 BPM | RESPIRATION RATE: 16 BRPM | OXYGEN SATURATION: 98 % | BODY MASS INDEX: 26.12 KG/M2 | DIASTOLIC BLOOD PRESSURE: 87 MMHG

## 2019-01-24 DIAGNOSIS — N92.1 MENORRHAGIA WITH IRREGULAR CYCLE: ICD-10-CM

## 2019-01-24 DIAGNOSIS — Z90.79 H/O BILATERAL SALPINGECTOMY: ICD-10-CM

## 2019-01-24 DIAGNOSIS — Z97.5 IUD CONTRACEPTION: ICD-10-CM

## 2019-01-24 LAB — HCG SERPL QL: NEGATIVE

## 2019-01-24 PROCEDURE — 58301 REMOVE INTRAUTERINE DEVICE: CPT | Performed by: OBSTETRICS & GYNECOLOGY

## 2019-01-24 PROCEDURE — 88305 TISSUE EXAM BY PATHOLOGIST: CPT | Performed by: OBSTETRICS & GYNECOLOGY

## 2019-01-24 PROCEDURE — 25010000002 MIDAZOLAM PER 1 MG: Performed by: NURSE ANESTHETIST, CERTIFIED REGISTERED

## 2019-01-24 PROCEDURE — 25010000002 PROPOFOL 10 MG/ML EMULSION: Performed by: NURSE ANESTHETIST, CERTIFIED REGISTERED

## 2019-01-24 PROCEDURE — 25010000002 FENTANYL CITRATE (PF) 100 MCG/2ML SOLUTION: Performed by: NURSE ANESTHETIST, CERTIFIED REGISTERED

## 2019-01-24 PROCEDURE — 25010000002 ONDANSETRON PER 1 MG: Performed by: NURSE ANESTHETIST, CERTIFIED REGISTERED

## 2019-01-24 PROCEDURE — 84703 CHORIONIC GONADOTROPIN ASSAY: CPT | Performed by: NURSE ANESTHETIST, CERTIFIED REGISTERED

## 2019-01-24 PROCEDURE — 25010000002 DEXAMETHASONE PER 1 MG: Performed by: NURSE ANESTHETIST, CERTIFIED REGISTERED

## 2019-01-24 PROCEDURE — 58563 HYSTEROSCOPY ABLATION: CPT | Performed by: OBSTETRICS & GYNECOLOGY

## 2019-01-24 RX ORDER — FENTANYL CITRATE 50 UG/ML
INJECTION, SOLUTION INTRAMUSCULAR; INTRAVENOUS AS NEEDED
Status: DISCONTINUED | OUTPATIENT
Start: 2019-01-24 | End: 2019-01-24 | Stop reason: SURG

## 2019-01-24 RX ORDER — SODIUM CHLORIDE, SODIUM LACTATE, POTASSIUM CHLORIDE, CALCIUM CHLORIDE 600; 310; 30; 20 MG/100ML; MG/100ML; MG/100ML; MG/100ML
9 INJECTION, SOLUTION INTRAVENOUS CONTINUOUS
Status: DISCONTINUED | OUTPATIENT
Start: 2019-01-24 | End: 2019-01-24 | Stop reason: HOSPADM

## 2019-01-24 RX ORDER — ONDANSETRON 2 MG/ML
4 INJECTION INTRAMUSCULAR; INTRAVENOUS ONCE AS NEEDED
Status: DISCONTINUED | OUTPATIENT
Start: 2019-01-24 | End: 2019-01-24 | Stop reason: HOSPADM

## 2019-01-24 RX ORDER — MIDAZOLAM HYDROCHLORIDE 1 MG/ML
1 INJECTION INTRAMUSCULAR; INTRAVENOUS
Status: DISCONTINUED | OUTPATIENT
Start: 2019-01-24 | End: 2019-01-24 | Stop reason: HOSPADM

## 2019-01-24 RX ORDER — ONDANSETRON 2 MG/ML
4 INJECTION INTRAMUSCULAR; INTRAVENOUS ONCE AS NEEDED
Status: COMPLETED | OUTPATIENT
Start: 2019-01-24 | End: 2019-01-24

## 2019-01-24 RX ORDER — SODIUM CHLORIDE, SODIUM LACTATE, POTASSIUM CHLORIDE, CALCIUM CHLORIDE 600; 310; 30; 20 MG/100ML; MG/100ML; MG/100ML; MG/100ML
100 INJECTION, SOLUTION INTRAVENOUS CONTINUOUS
Status: DISCONTINUED | OUTPATIENT
Start: 2019-01-24 | End: 2019-01-24 | Stop reason: HOSPADM

## 2019-01-24 RX ORDER — SODIUM CHLORIDE 0.9 % (FLUSH) 0.9 %
1-10 SYRINGE (ML) INJECTION AS NEEDED
Status: DISCONTINUED | OUTPATIENT
Start: 2019-01-24 | End: 2019-01-24 | Stop reason: HOSPADM

## 2019-01-24 RX ORDER — MIDAZOLAM HYDROCHLORIDE 1 MG/ML
2 INJECTION INTRAMUSCULAR; INTRAVENOUS
Status: DISCONTINUED | OUTPATIENT
Start: 2019-01-24 | End: 2019-01-24 | Stop reason: HOSPADM

## 2019-01-24 RX ORDER — IBUPROFEN 800 MG/1
800 TABLET ORAL EVERY 8 HOURS PRN
Qty: 30 TABLET | Refills: 0 | OUTPATIENT
Start: 2019-01-24 | End: 2019-11-09

## 2019-01-24 RX ORDER — DEXAMETHASONE SODIUM PHOSPHATE 4 MG/ML
8 INJECTION, SOLUTION INTRA-ARTICULAR; INTRALESIONAL; INTRAMUSCULAR; INTRAVENOUS; SOFT TISSUE ONCE AS NEEDED
Status: COMPLETED | OUTPATIENT
Start: 2019-01-24 | End: 2019-01-24

## 2019-01-24 RX ORDER — LIDOCAINE HYDROCHLORIDE 20 MG/ML
INJECTION, SOLUTION INFILTRATION; PERINEURAL AS NEEDED
Status: DISCONTINUED | OUTPATIENT
Start: 2019-01-24 | End: 2019-01-24 | Stop reason: SURG

## 2019-01-24 RX ORDER — MEPERIDINE HYDROCHLORIDE 25 MG/ML
12.5 INJECTION INTRAMUSCULAR; INTRAVENOUS; SUBCUTANEOUS
Status: DISCONTINUED | OUTPATIENT
Start: 2019-01-24 | End: 2019-01-24 | Stop reason: HOSPADM

## 2019-01-24 RX ORDER — FENTANYL CITRATE 50 UG/ML
50 INJECTION, SOLUTION INTRAMUSCULAR; INTRAVENOUS
Status: DISCONTINUED | OUTPATIENT
Start: 2019-01-24 | End: 2019-01-24 | Stop reason: HOSPADM

## 2019-01-24 RX ORDER — LIDOCAINE HYDROCHLORIDE 10 MG/ML
0.5 INJECTION, SOLUTION EPIDURAL; INFILTRATION; INTRACAUDAL; PERINEURAL ONCE AS NEEDED
Status: COMPLETED | OUTPATIENT
Start: 2019-01-24 | End: 2019-01-24

## 2019-01-24 RX ORDER — HYDROCODONE BITARTRATE AND ACETAMINOPHEN 5; 325 MG/1; MG/1
1 TABLET ORAL ONCE AS NEEDED
Status: COMPLETED | OUTPATIENT
Start: 2019-01-24 | End: 2019-01-24

## 2019-01-24 RX ORDER — SODIUM CHLORIDE 9 MG/ML
40 INJECTION, SOLUTION INTRAVENOUS AS NEEDED
Status: DISCONTINUED | OUTPATIENT
Start: 2019-01-24 | End: 2019-01-24 | Stop reason: HOSPADM

## 2019-01-24 RX ORDER — PROPOFOL 10 MG/ML
VIAL (ML) INTRAVENOUS AS NEEDED
Status: DISCONTINUED | OUTPATIENT
Start: 2019-01-24 | End: 2019-01-24 | Stop reason: SURG

## 2019-01-24 RX ORDER — SODIUM CHLORIDE 9 MG/ML
INJECTION, SOLUTION INTRAVENOUS AS NEEDED
Status: DISCONTINUED | OUTPATIENT
Start: 2019-01-24 | End: 2019-01-24 | Stop reason: HOSPADM

## 2019-01-24 RX ORDER — OXYCODONE HYDROCHLORIDE AND ACETAMINOPHEN 5; 325 MG/1; MG/1
2 TABLET ORAL EVERY 4 HOURS PRN
Qty: 30 TABLET | Refills: 0 | Status: SHIPPED | OUTPATIENT
Start: 2019-01-24 | End: 2019-02-03

## 2019-01-24 RX ORDER — SODIUM CHLORIDE 0.9 % (FLUSH) 0.9 %
3 SYRINGE (ML) INJECTION EVERY 12 HOURS SCHEDULED
Status: DISCONTINUED | OUTPATIENT
Start: 2019-01-24 | End: 2019-01-24 | Stop reason: HOSPADM

## 2019-01-24 RX ORDER — MAGNESIUM HYDROXIDE 1200 MG/15ML
LIQUID ORAL AS NEEDED
Status: DISCONTINUED | OUTPATIENT
Start: 2019-01-24 | End: 2019-01-24 | Stop reason: HOSPADM

## 2019-01-24 RX ADMIN — FENTANYL CITRATE 50 MCG: 50 INJECTION, SOLUTION INTRAMUSCULAR; INTRAVENOUS at 13:53

## 2019-01-24 RX ADMIN — LIDOCAINE HYDROCHLORIDE 100 MG: 20 INJECTION, SOLUTION INFILTRATION; PERINEURAL at 12:50

## 2019-01-24 RX ADMIN — MIDAZOLAM HYDROCHLORIDE 1 MG: 1 INJECTION, SOLUTION INTRAMUSCULAR; INTRAVENOUS at 11:53

## 2019-01-24 RX ADMIN — LIDOCAINE HYDROCHLORIDE 0.5 ML: 10 INJECTION, SOLUTION EPIDURAL; INFILTRATION; INTRACAUDAL; PERINEURAL at 11:54

## 2019-01-24 RX ADMIN — FAMOTIDINE 20 MG: 10 INJECTION INTRAVENOUS at 11:53

## 2019-01-24 RX ADMIN — FENTANYL CITRATE 50 MCG: 50 INJECTION, SOLUTION INTRAMUSCULAR; INTRAVENOUS at 13:43

## 2019-01-24 RX ADMIN — PROPOFOL 150 MG: 10 INJECTION, EMULSION INTRAVENOUS at 12:51

## 2019-01-24 RX ADMIN — FENTANYL CITRATE 50 MCG: 50 INJECTION, SOLUTION INTRAMUSCULAR; INTRAVENOUS at 12:57

## 2019-01-24 RX ADMIN — PROPOFOL 50 MG: 10 INJECTION, EMULSION INTRAVENOUS at 12:56

## 2019-01-24 RX ADMIN — FENTANYL CITRATE 50 MCG: 50 INJECTION, SOLUTION INTRAMUSCULAR; INTRAVENOUS at 12:50

## 2019-01-24 RX ADMIN — SODIUM CHLORIDE, POTASSIUM CHLORIDE, SODIUM LACTATE AND CALCIUM CHLORIDE 9 ML/HR: 600; 310; 30; 20 INJECTION, SOLUTION INTRAVENOUS at 11:54

## 2019-01-24 RX ADMIN — SODIUM CHLORIDE, POTASSIUM CHLORIDE, SODIUM LACTATE AND CALCIUM CHLORIDE: 600; 310; 30; 20 INJECTION, SOLUTION INTRAVENOUS at 12:36

## 2019-01-24 RX ADMIN — ONDANSETRON 4 MG: 2 INJECTION, SOLUTION INTRAMUSCULAR; INTRAVENOUS at 11:53

## 2019-01-24 RX ADMIN — DEXAMETHASONE SODIUM PHOSPHATE 8 MG: 4 INJECTION, SOLUTION INTRAMUSCULAR; INTRAVENOUS at 11:54

## 2019-01-24 RX ADMIN — HYDROCODONE BITARTRATE AND ACETAMINOPHEN 1 TABLET: 5; 325 TABLET ORAL at 14:16

## 2019-01-24 NOTE — ANESTHESIA PROCEDURE NOTES
Airway  Urgency: elective    Airway not difficult    General Information and Staff    Patient location during procedure: OR  CRNA: Yash Vasquez CRNA    Indications and Patient Condition  Indications for airway management: airway protection    Preoxygenated: yes  MILS not maintained throughout  Mask difficulty assessment: 0 - not attempted    Final Airway Details  Final airway type: supraglottic airway      Successful airway: classic  Size 3    Number of attempts at approach: 1    Additional Comments  Atraumatic.  LMA good seal

## 2019-01-24 NOTE — INTERVAL H&P NOTE
H&P reviewed. The patient was examined and there are no changes to the H&P.    /89 (BP Location: Left arm, Patient Position: Lying)   Pulse 79   Temp 98.2 °F (36.8 °C) (Oral)   Resp 16   Wt 64.8 kg (142 lb 12.8 oz)   LMP  (LMP Unknown)   SpO2 97%   BMI 26.12 kg/m²     Medications Prior to Admission   Medication Sig Dispense Refill Last Dose   • citalopram (CeleXA) 20 MG tablet Take 20 mg by mouth Every Night.   1/21/2019   • lidocaine (LIDODERM) 5 % Place 1 patch on the skin as directed by provider Daily As Needed for Mild Pain  (back pain). Remove & Discard patch within 12 hours or as directed by MD   Taking   • TRAZODONE HCL PO Take 100 mg by mouth At Night As Needed.   Taking

## 2019-01-24 NOTE — ANESTHESIA POSTPROCEDURE EVALUATION
Patient: Talisha Zepeda    Procedure Summary     Date:  01/24/19 Room / Location:   [facility] OR 2 /  [facility]    Anesthesia Start:  1248 Anesthesia Stop:  1333    Procedure:  INTRAUTERINE DEVICE REMOVAL  DIAGNOSTIC HYSTEROSCOPY, DILATATION AND CURETTAGE, NOVASURE ENDOMETRIAL ABLATION. (N/A Vagina) Diagnosis:       IUD contraception      H/O bilateral salpingectomy      Menorrhagia with irregular cycle      (IUD contraception [Z97.5])      (H/O bilateral salpingectomy [Z90.79])      (Menorrhagia with irregular cycle [N92.1])    Surgeon:  Elvis Andre MD Provider:  Ysah Vasquez CRNA    Anesthesia Type:  general ASA Status:  2          Anesthesia Type: general  Last vitals  BP   112/87 (01/24/19 1410)   Temp   97.5 °F (36.4 °C) (01/24/19 1410)   Pulse   80 (01/24/19 1410)   Resp   14 (01/24/19 1410)     SpO2   98 % (01/24/19 1410)     Post Anesthesia Care and Evaluation    Patient location during evaluation: bedside  Patient participation: complete - patient participated  Level of consciousness: awake and alert  Pain score: 0  Pain management: adequate  Airway patency: patent  Anesthetic complications: No anesthetic complications  PONV Status: none  Cardiovascular status: acceptable  Respiratory status: acceptable  Hydration status: acceptable

## 2019-01-24 NOTE — OP NOTE
OPERATIVE REPORT    PROCEDURE:   IUD REMOVAL, DX HYSTEROSCOPY, DILATATION & CURETTAGE, NOVASURE ENDOMETRIAL ABLATION    PREOP DIAGNOSIS:  menometrorrhagia    POSTOP DIAGNOSIS:  same    SURGEON:   Jes    ANESTHESIA:  LMA    EBL:   1cc    IVFS:  750cc    URINE OUTPUT:  50cc    COMPLICATIONS:  none    FINDINGS:  Normal endometrial cavity    SPECIMENS:  Endometrial curettings.    DESCRIPTION OF THE PROCEDURE:     After informed consent was obtained, pt was taken to the OR and placed on the table in a DORSOLITHOTOMY position.  LMA was induced without difficulty.  Time out was done, no antibiotics were given. Pt was prepped and draped in the usual fashion.    An open graves speculum was placed in the vagina and the anterior lip of the cervix was grasped with a single toothed tenaculum.  The cervix appeared normal.  The Mirena IUD was removed and handed off to the OR personnel.    The cervix was serially dilated with mays dilators with moderate difficulty. The uterus was sounded to 9 cms.  A serrated curette was used to curette out the entire endometrial cavity.  The mays dilators were used to serially dilate the endocervical canal sufficiently to admit the diagnostic hysteroscope.    Using sterile saline, the hysteroscope was used to visualize the endometrial cavity in its entirety.  Both tubal ostia were seen, the cavity was smooth, and no masses were visualized.  The hysteroscope was removed.    The Novasure endometrial ablation apparatus was then placed in the endometrial cavity per protocol and using the settings: 5.0cms x 4.0cms x 110W x 120s, an ablation was performed without problems.  The apparatus was removed and the dx hysteroscopy was reinserted and using sterile saline the endometrial cavity was revisualized in its entirety.  There appeared to be a good miller and no evidence of injury or complication.       All instruments were removed.  There was no evidence of cervical laceration or uterine  perforation.    All sponge, instrument counts were correct x 3 according to the OR personnel.  Pt went to RR in satisfactory condition.      Elvis Andre MD  1:34 PM  01/24/19

## 2019-01-24 NOTE — ANESTHESIA PREPROCEDURE EVALUATION
Anesthesia Evaluation     Patient summary reviewed and Nursing notes reviewed   no history of anesthetic complications:  NPO Solid Status: > 8 hours  NPO Liquid Status: > 2 hours           Airway   Mallampati: II  TM distance: >3 FB  Neck ROM: full  No difficulty expected  Dental - normal exam     Pulmonary - normal exam   (+) asthma (no inhaler use),   Cardiovascular - normal exam    ECG reviewed  Rhythm: regular  Rate: normal    CHF: HX CHF during pregnancy. Resolved.    ROS comment: ECHO 2017: Normal findings -  EF 50%    Neuro/Psych  (+) psychiatric history Anxiety and Depression,     GI/Hepatic/Renal/Endo - negative ROS     Musculoskeletal     (+) back pain,   Abdominal  - normal exam   Substance History   (+) alcohol use (occ),      OB/GYN negative ob/gyn ROS         Other                      Anesthesia Plan    ASA 2     general     intravenous induction   Anesthetic plan, all risks, benefits, and alternatives have been provided, discussed and informed consent has been obtained with: patient.  Use of blood products discussed with patient  Consented to blood products.

## 2019-01-26 LAB
CYTO UR: NORMAL
LAB AP CASE REPORT: NORMAL
LAB AP DIAGNOSIS COMMENT: NORMAL
PATH REPORT.FINAL DX SPEC: NORMAL
PATH REPORT.GROSS SPEC: NORMAL

## 2019-02-13 ENCOUNTER — TELEPHONE (OUTPATIENT)
Dept: OBSTETRICS AND GYNECOLOGY | Facility: CLINIC | Age: 39
End: 2019-02-13

## 2019-02-13 NOTE — TELEPHONE ENCOUNTER
Patient came in for her appt today but didn't want to wait for you being behind due to tech difficulties.  She rescheduled for 2/20/19.  But she wants to know if its ok for her to have sex?  She is 3 weeks out post-op.  Thanks.

## 2019-08-15 ENCOUNTER — HOSPITAL ENCOUNTER (OUTPATIENT)
Dept: GENERAL RADIOLOGY | Facility: HOSPITAL | Age: 39
Discharge: HOME | End: 2019-08-15
Admitting: NURSE PRACTITIONER

## 2019-08-15 ENCOUNTER — TRANSCRIBE ORDERS (OUTPATIENT)
Dept: ADMINISTRATIVE | Facility: HOSPITAL | Age: 39
End: 2019-08-15

## 2019-08-15 DIAGNOSIS — R06.00 DYSPNEA, UNSPECIFIED TYPE: ICD-10-CM

## 2019-08-15 DIAGNOSIS — R06.00 DYSPNEA, UNSPECIFIED TYPE: Primary | ICD-10-CM

## 2019-08-15 PROCEDURE — 71046 X-RAY EXAM CHEST 2 VIEWS: CPT

## 2019-11-09 ENCOUNTER — HOSPITAL ENCOUNTER (EMERGENCY)
Facility: HOSPITAL | Age: 39
Discharge: HOME | End: 2019-11-09
Attending: EMERGENCY MEDICINE | Admitting: EMERGENCY MEDICINE

## 2019-11-09 VITALS
WEIGHT: 147 LBS | RESPIRATION RATE: 14 BRPM | DIASTOLIC BLOOD PRESSURE: 99 MMHG | HEART RATE: 88 BPM | SYSTOLIC BLOOD PRESSURE: 149 MMHG | HEIGHT: 62 IN | TEMPERATURE: 98.3 F | BODY MASS INDEX: 27.05 KG/M2 | OXYGEN SATURATION: 100 %

## 2019-11-09 DIAGNOSIS — J02.9 VIRAL PHARYNGITIS: Primary | ICD-10-CM

## 2019-11-09 LAB — S PYO AG THROAT QL: NEGATIVE

## 2019-11-09 PROCEDURE — 87147 CULTURE TYPE IMMUNOLOGIC: CPT | Performed by: EMERGENCY MEDICINE

## 2019-11-09 PROCEDURE — 87880 STREP A ASSAY W/OPTIC: CPT | Performed by: EMERGENCY MEDICINE

## 2019-11-09 PROCEDURE — 99283 EMERGENCY DEPT VISIT LOW MDM: CPT

## 2019-11-09 PROCEDURE — 99282 EMERGENCY DEPT VISIT SF MDM: CPT | Performed by: EMERGENCY MEDICINE

## 2019-11-09 PROCEDURE — 87081 CULTURE SCREEN ONLY: CPT | Performed by: EMERGENCY MEDICINE

## 2019-11-09 RX ORDER — GUAIFENESIN, PSEUDOEPHEDRINE HYDROCHLORIDE 600; 60 MG/1; MG/1
1 TABLET, EXTENDED RELEASE ORAL EVERY 12 HOURS
Qty: 10 TABLET | Refills: 0 | Status: SHIPPED | OUTPATIENT
Start: 2019-11-09 | End: 2019-11-14

## 2019-11-09 RX ORDER — DEXTROAMPHETAMINE SACCHARATE, AMPHETAMINE ASPARTATE, DEXTROAMPHETAMINE SULFATE AND AMPHETAMINE SULFATE 2.5; 2.5; 2.5; 2.5 MG/1; MG/1; MG/1; MG/1
20 TABLET ORAL DAILY
COMMUNITY
End: 2020-02-02

## 2019-11-09 NOTE — ED PROVIDER NOTES
Subjective   History of Present Illness  History of Present Illness    Chief complaint: Sore throat    Location: Throat    Quality/Severity: Moderate pain    Timing/Duration: Present for the past couple of days    Modifying Factors: Worse with swallowing or coughing    Narrative: This patient presents for evaluation of a sore throat.  She says that for the past couple days she has had a sore throat with some congestion and drainage.  She says it hurts to cough or swallow.  She has had some nausea and gagging symptoms that has led to occasional vomiting.  She says the vomiting is mostly just because of the pain in her throat.  She says that her son had some similar symptoms that was a virus recently.  She has tried taking some over-the-counter medicine such as DayQuil.  However these remedies have not helped with her symptoms very much.    Associated Symptoms: As above    Review of Systems   Constitutional: Negative for activity change, diaphoresis and fever.   HENT: Positive for congestion, ear pain, postnasal drip, rhinorrhea, sinus pressure, sore throat, trouble swallowing and voice change (hoarse voice). Negative for dental problem, drooling, ear discharge and facial swelling.    Respiratory: Positive for cough (occasional). Negative for shortness of breath.    Cardiovascular: Negative for chest pain.   Gastrointestinal: Positive for vomiting. Negative for abdominal pain.   Skin: Negative for color change and rash.   Neurological: Negative for syncope.   All other systems reviewed and are negative.      Past Medical History:   Diagnosis Date   • ADHD    • Anxiety    • Asthma    • Chest pain, midsternal     states w/anxiety & activity, relieved w/tums, not sure if reflux or d/t anxiety   • Depression    • History of acquired CHF (congestive heart failure) 2015    with/after pregnancy   • History of gestational hypertension    • History of transfusion 2015    after delivery   • Hx gestational diabetes 2015   • Low  back pain     right, h/o slipped disc   • Personal history of cardiomyopathy 2015    w/pregnancy, states treated by Dr Ortiz at [facility]       No Known Allergies    Past Surgical History:   Procedure Laterality Date   •  SECTION      x3   • CHIN IMPLANT INSERTION      w/jaw surgery   • D&C HYSTEROSCOPY ENDOMETRIAL ABLATION N/A 2019    Procedure: INTRAUTERINE DEVICE REMOVAL  DIAGNOSTIC HYSTEROSCOPY, DILATATION AND CURETTAGE, NOVASURE ENDOMETRIAL ABLATION.;  Surgeon: Elvis Andre MD;  Location: [facility];  Service: Obstetrics/Gynecology   • TUBAL ABDOMINAL LIGATION         Family History   Problem Relation Age of Onset   • Breast cancer Mother    • Kidney failure Mother    • Diabetes Mother    • Heart failure Mother    • Hypertension Mother    • Asthma Father    • Sleep apnea Father    • Heart failure Maternal Grandfather    • Malig Hyperthermia Neg Hx        Social History     Socioeconomic History   • Marital status:      Spouse name: Not on file   • Number of children: Not on file   • Years of education: Not on file   • Highest education level: Not on file   Tobacco Use   • Smoking status: Former Smoker     Years: 15.00     Types: Cigarettes     Last attempt to quit:      Years since quittin.8   • Smokeless tobacco: Never Used   • Tobacco comment: states not sure how much she smoked   Substance and Sexual Activity   • Alcohol use: No     Comment: caff use   • Drug use: No   • Sexual activity: Defer       ED Triage Vitals [19 0207]   Temp Heart Rate Resp BP SpO2   98.3 °F (36.8 °C) 88 14 149/99 100 %      Temp src Heart Rate Source Patient Position BP Location FiO2 (%)   Oral -- -- -- --         Objective   Physical Exam   Constitutional: She is oriented to person, place, and time. She appears well-developed and well-nourished.  Non-toxic appearance. She does not appear ill. No distress.   HENT:   Head: Normocephalic and atraumatic.   Right Ear: Tympanic membrane and  ear canal normal. No drainage or swelling.   Left Ear: Tympanic membrane and ear canal normal. No drainage or swelling.   Mouth/Throat: Mucous membranes are normal. No oral lesions. No uvula swelling. Posterior oropharyngeal erythema (mild) present. No oropharyngeal exudate, posterior oropharyngeal edema or tonsillar abscesses. No tonsillar exudate.   Eyes: EOM are normal. Pupils are equal, round, and reactive to light. Right eye exhibits no discharge. Left eye exhibits no discharge.   Neck: Normal range of motion. Neck supple.   Cardiovascular: Normal rate, regular rhythm, normal heart sounds and intact distal pulses.   No murmur heard.  Pulmonary/Chest: Effort normal and breath sounds normal. No stridor. No respiratory distress. She has no wheezes. She has no rhonchi.   Breath sounds are normal bilaterally.  Normal work of breathing noted.  Few occasional nonproductive cough noted.   Musculoskeletal: Normal range of motion. She exhibits no edema or deformity.   Lymphadenopathy:     She has no cervical adenopathy.   Neurological: She is alert and oriented to person, place, and time.   Skin: Skin is warm and dry. No rash noted. No erythema. No pallor.   Psychiatric: She has a normal mood and affect. Her behavior is normal. Judgment and thought content normal.   Nursing note and vitals reviewed.    Results for orders placed or performed during the hospital encounter of 11/09/19   Rapid Strep A Screen - Swab, Throat   Result Value Ref Range    Strep A Ag Negative Negative       Procedures           ED Course  ED Course as of Nov 09 0422   Sat Nov 09, 2019 0422 I reviewed the strep swab which is negative.  Patient is afebrile.  Her exam is non-worrisome.  I think she has a viral pharyngitis, URI process.  I recommended continued symptomatic management with over-the-counter medicines such as decongestants and expectorants.  Advise follow-up with primary care doctor this week if not improving.  Discharged home in good  condition.  [JERRICA]      ED Course User Index  [JERRICA] Ranjit Sommer MD                  [facility]    Final diagnoses:   Viral pharyngitis              Ranjit Sommer MD  11/09/19 0427

## 2019-11-09 NOTE — DISCHARGE INSTRUCTIONS
Rest as needed.  Drink plenty of fluids as tolerated.  Please return to the emergency room for any worsening pain, swelling, fevers, difficulty swallowing, difficulties breathing or any other concerns.

## 2019-11-11 LAB
BACTERIA SPEC AEROBE CULT: ABNORMAL
STREP GROUPING: ABNORMAL

## 2019-12-03 ENCOUNTER — HOSPITAL ENCOUNTER (EMERGENCY)
Facility: HOSPITAL | Age: 39
Discharge: HOME | End: 2019-12-04
Attending: EMERGENCY MEDICINE | Admitting: EMERGENCY MEDICINE

## 2019-12-03 DIAGNOSIS — R11.2 NAUSEA AND VOMITING, INTRACTABILITY OF VOMITING NOT SPECIFIED, UNSPECIFIED VOMITING TYPE: ICD-10-CM

## 2019-12-03 DIAGNOSIS — R42 LIGHTHEADEDNESS: ICD-10-CM

## 2019-12-03 DIAGNOSIS — R10.84 GENERALIZED ABDOMINAL PAIN: Primary | ICD-10-CM

## 2019-12-03 PROCEDURE — 99283 EMERGENCY DEPT VISIT LOW MDM: CPT

## 2019-12-03 RX ORDER — KETOROLAC TROMETHAMINE 30 MG/ML
30 INJECTION, SOLUTION INTRAMUSCULAR; INTRAVENOUS ONCE
Status: COMPLETED | OUTPATIENT
Start: 2019-12-03 | End: 2019-12-04

## 2019-12-03 RX ORDER — SODIUM CHLORIDE 0.9 % (FLUSH) 0.9 %
10 SYRINGE (ML) INJECTION AS NEEDED
Status: DISCONTINUED | OUTPATIENT
Start: 2019-12-03 | End: 2019-12-04 | Stop reason: HOSPADM

## 2019-12-03 RX ORDER — ONDANSETRON 2 MG/ML
4 INJECTION INTRAMUSCULAR; INTRAVENOUS ONCE
Status: COMPLETED | OUTPATIENT
Start: 2019-12-03 | End: 2019-12-04

## 2019-12-04 VITALS
TEMPERATURE: 98.5 F | SYSTOLIC BLOOD PRESSURE: 135 MMHG | HEART RATE: 74 BPM | DIASTOLIC BLOOD PRESSURE: 86 MMHG | WEIGHT: 147 LBS | RESPIRATION RATE: 18 BRPM | BODY MASS INDEX: 27.05 KG/M2 | HEIGHT: 62 IN | OXYGEN SATURATION: 99 %

## 2019-12-04 LAB
ALBUMIN SERPL-MCNC: 4.8 G/DL (ref 3.5–5.2)
ALBUMIN/GLOB SERPL: 1.5 G/DL
ALP SERPL-CCNC: 155 U/L (ref 39–117)
ALT SERPL W P-5'-P-CCNC: 33 U/L (ref 1–33)
ANION GAP SERPL CALCULATED.3IONS-SCNC: 15.8 MMOL/L (ref 5–15)
AST SERPL-CCNC: 31 U/L (ref 1–32)
B-HCG UR QL: NEGATIVE
BASOPHILS # BLD AUTO: 0.02 10*3/MM3 (ref 0–0.2)
BASOPHILS NFR BLD AUTO: 0.2 % (ref 0–1.5)
BILIRUB SERPL-MCNC: 0.7 MG/DL (ref 0.2–1.2)
BILIRUB UR QL STRIP: NEGATIVE
BUN BLD-MCNC: 7 MG/DL (ref 6–20)
BUN/CREAT SERPL: 10.1 (ref 7–25)
CALCIUM SPEC-SCNC: 9.6 MG/DL (ref 8.6–10.5)
CHLORIDE SERPL-SCNC: 100 MMOL/L (ref 98–107)
CLARITY UR: CLEAR
CO2 SERPL-SCNC: 24.2 MMOL/L (ref 22–29)
COLOR UR: YELLOW
CREAT BLD-MCNC: 0.69 MG/DL (ref 0.57–1)
DEPRECATED RDW RBC AUTO: 40.7 FL (ref 37–54)
EOSINOPHIL # BLD AUTO: 0.01 10*3/MM3 (ref 0–0.4)
EOSINOPHIL NFR BLD AUTO: 0.1 % (ref 0.3–6.2)
ERYTHROCYTE [DISTWIDTH] IN BLOOD BY AUTOMATED COUNT: 11.9 % (ref 12.3–15.4)
GFR SERPL CREATININE-BSD FRML MDRD: 95 ML/MIN/1.73
GLOBULIN UR ELPH-MCNC: 3.1 GM/DL
GLUCOSE BLD-MCNC: 87 MG/DL (ref 65–99)
GLUCOSE UR STRIP-MCNC: NEGATIVE MG/DL
HCT VFR BLD AUTO: 42.7 % (ref 34–46.6)
HGB BLD-MCNC: 14.6 G/DL (ref 12–15.9)
HGB UR QL STRIP.AUTO: NEGATIVE
IMM GRANULOCYTES # BLD AUTO: 0.06 10*3/MM3 (ref 0–0.05)
IMM GRANULOCYTES NFR BLD AUTO: 0.7 % (ref 0–0.5)
KETONES UR QL STRIP: NEGATIVE
LEUKOCYTE ESTERASE UR QL STRIP.AUTO: NEGATIVE
LIPASE SERPL-CCNC: 19 U/L (ref 13–60)
LYMPHOCYTES # BLD AUTO: 1.24 10*3/MM3 (ref 0.7–3.1)
LYMPHOCYTES NFR BLD AUTO: 13.5 % (ref 19.6–45.3)
MCH RBC QN AUTO: 31.7 PG (ref 26.6–33)
MCHC RBC AUTO-ENTMCNC: 34.2 G/DL (ref 31.5–35.7)
MCV RBC AUTO: 92.6 FL (ref 79–97)
MONOCYTES # BLD AUTO: 0.39 10*3/MM3 (ref 0.1–0.9)
MONOCYTES NFR BLD AUTO: 4.3 % (ref 5–12)
NEUTROPHILS # BLD AUTO: 7.44 10*3/MM3 (ref 1.7–7)
NEUTROPHILS NFR BLD AUTO: 81.2 % (ref 42.7–76)
NITRITE UR QL STRIP: NEGATIVE
NRBC BLD AUTO-RTO: 0 /100 WBC (ref 0–0.2)
PH UR STRIP.AUTO: 8.5 [PH] (ref 4.5–8)
PLATELET # BLD AUTO: 287 10*3/MM3 (ref 140–450)
PMV BLD AUTO: 9.8 FL (ref 6–12)
POTASSIUM BLD-SCNC: 3.4 MMOL/L (ref 3.5–5.2)
PROT SERPL-MCNC: 7.9 G/DL (ref 6–8.5)
PROT UR QL STRIP: NEGATIVE
RBC # BLD AUTO: 4.61 10*6/MM3 (ref 3.77–5.28)
SODIUM BLD-SCNC: 140 MMOL/L (ref 136–145)
SP GR UR STRIP: 1.02 (ref 1–1.03)
UROBILINOGEN UR QL STRIP: ABNORMAL
WBC NRBC COR # BLD: 9.16 10*3/MM3 (ref 3.4–10.8)

## 2019-12-04 PROCEDURE — 81003 URINALYSIS AUTO W/O SCOPE: CPT | Performed by: EMERGENCY MEDICINE

## 2019-12-04 PROCEDURE — 25010000002 ONDANSETRON PER 1 MG: Performed by: EMERGENCY MEDICINE

## 2019-12-04 PROCEDURE — 99284 EMERGENCY DEPT VISIT MOD MDM: CPT | Performed by: EMERGENCY MEDICINE

## 2019-12-04 PROCEDURE — 83690 ASSAY OF LIPASE: CPT | Performed by: EMERGENCY MEDICINE

## 2019-12-04 PROCEDURE — 80053 COMPREHEN METABOLIC PANEL: CPT | Performed by: EMERGENCY MEDICINE

## 2019-12-04 PROCEDURE — 85025 COMPLETE CBC W/AUTO DIFF WBC: CPT | Performed by: EMERGENCY MEDICINE

## 2019-12-04 PROCEDURE — 25010000002 KETOROLAC TROMETHAMINE PER 15 MG: Performed by: EMERGENCY MEDICINE

## 2019-12-04 PROCEDURE — 81025 URINE PREGNANCY TEST: CPT | Performed by: EMERGENCY MEDICINE

## 2019-12-04 PROCEDURE — 96374 THER/PROPH/DIAG INJ IV PUSH: CPT

## 2019-12-04 PROCEDURE — 96375 TX/PRO/DX INJ NEW DRUG ADDON: CPT

## 2019-12-04 RX ORDER — ONDANSETRON 4 MG/1
4 TABLET, FILM COATED ORAL EVERY 8 HOURS PRN
Qty: 20 TABLET | Refills: 0 | Status: SHIPPED | OUTPATIENT
Start: 2019-12-04 | End: 2019-12-11

## 2019-12-04 RX ADMIN — ONDANSETRON 4 MG: 2 INJECTION, SOLUTION INTRAMUSCULAR; INTRAVENOUS at 00:01

## 2019-12-04 RX ADMIN — KETOROLAC TROMETHAMINE 30 MG: 30 INJECTION, SOLUTION INTRAMUSCULAR; INTRAVENOUS at 00:01

## 2019-12-04 RX ADMIN — SODIUM CHLORIDE 1000 ML: 9 INJECTION, SOLUTION INTRAVENOUS at 00:00

## 2019-12-04 NOTE — DISCHARGE INSTRUCTIONS
Recommend gentle diet and plenty of fluids as tolerated.  Please return to the emergency room for any worsening pain, fevers, weakness, dizziness, nausea, vomiting, diarrhea or any other concerns.

## 2019-12-04 NOTE — ED PROVIDER NOTES
Subjective   History of Present Illness  History of Present Illness    Chief complaint: Lightheaded, abdominal pain, nausea, vomiting    Location: Central abdomen, nonradiating    Quality/Severity: Moderate sharp pains    Timing/Duration: Began this evening while at work.  Persistent.    Modifying Factors: None    Narrative: This patient presents for evaluation of new onset abdominal pain with nausea and vomiting and lightheadedness.  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  She began feeling lightheaded and having some abdominal discomfort in the central abdomen.  It did not radiate to the back.  Then she developed some nausea and vomited at least 4 times.  She has not had any diarrhea.  She has not had any fevers or chills.  She denies any dysuria or hematuria.  She has not taken any medication for the symptoms prior to arrival.  She left work and came here immediately.    Associated Symptoms: Above    Review of Systems   Constitutional: Negative for activity change, diaphoresis and fever.   HENT: Negative.  Negative for drooling.    Eyes: Negative for pain and visual disturbance.   Respiratory: Negative for cough and shortness of breath.    Cardiovascular: Negative for chest pain.   Gastrointestinal: Positive for abdominal pain, nausea and vomiting. Negative for diarrhea.   Genitourinary: Negative for dysuria, flank pain, frequency and hematuria.   Skin: Negative for color change and rash.   Neurological: Positive for light-headedness. Negative for tremors, seizures, syncope, speech difficulty and headaches.   All other systems reviewed and are negative.      Past Medical History:   Diagnosis Date   • ADHD    • Anxiety    • Asthma    • Chest pain, midsternal     states w/anxiety & activity, relieved w/tums, not sure if reflux or d/t anxiety   • Depression    • History of acquired CHF (congestive heart failure) 2015    with/after pregnancy   • History of gestational  hypertension    • History of transfusion 2015    after delivery   • Hx gestational diabetes 2015   • Low back pain     right, h/o slipped disc   • Personal history of cardiomyopathy 2015    w/pregnancy, states treated by Dr Ortiz at [facility]       No Known Allergies    Past Surgical History:   Procedure Laterality Date   •  SECTION      x3   • CHIN IMPLANT INSERTION      w/jaw surgery   • D&C HYSTEROSCOPY ENDOMETRIAL ABLATION N/A 2019    Procedure: INTRAUTERINE DEVICE REMOVAL  DIAGNOSTIC HYSTEROSCOPY, DILATATION AND CURETTAGE, NOVASURE ENDOMETRIAL ABLATION.;  Surgeon: Elvis Andre MD;  Location: [facility];  Service: Obstetrics/Gynecology   • TUBAL ABDOMINAL LIGATION         Family History   Problem Relation Age of Onset   • Breast cancer Mother    • Kidney failure Mother    • Diabetes Mother    • Heart failure Mother    • Hypertension Mother    • Asthma Father    • Sleep apnea Father    • Heart failure Maternal Grandfather    • Malig Hyperthermia Neg Hx        Social History     Socioeconomic History   • Marital status:      Spouse name: Not on file   • Number of children: Not on file   • Years of education: Not on file   • Highest education level: Not on file   Tobacco Use   • Smoking status: Former Smoker     Years: 15.00     Types: Cigarettes     Last attempt to quit:      Years since quittin.9   • Smokeless tobacco: Never Used   • Tobacco comment: states not sure how much she smoked   Substance and Sexual Activity   • Alcohol use: No     Comment: caff use   • Drug use: No   • Sexual activity: Defer     ED Triage Vitals [19 2344]   Temp Heart Rate Resp BP SpO2   97.7 °F (36.5 °C) 89 18 125/98 99 %      Temp src Heart Rate Source Patient Position BP Location FiO2 (%)   Oral Monitor Lying Left arm --           Objective   Physical Exam   Constitutional: She is oriented to person, place, and time. She appears well-developed and well-nourished.  Non-toxic appearance.  She does not appear ill. No distress.   HENT:   Head: Normocephalic and atraumatic.   Eyes: EOM are normal. Pupils are equal, round, and reactive to light. Right eye exhibits no discharge. Left eye exhibits no discharge.   Neck: Normal range of motion. Neck supple.   Cardiovascular: Normal rate, regular rhythm, normal heart sounds and intact distal pulses.   No murmur heard.  Pulmonary/Chest: Effort normal and breath sounds normal. No stridor. No respiratory distress.   Abdominal: Normal appearance and bowel sounds are normal. She exhibits no distension and no pulsatile midline mass. There is generalized tenderness (mild, diffusely in all quadrants.  no peritoneal signs). There is no rigidity, no rebound, no guarding, no tenderness at McBurney's point and negative Partida's sign. No hernia.   Musculoskeletal: Normal range of motion. She exhibits no edema or deformity.   Neurological: She is alert and oriented to person, place, and time.   Skin: Skin is warm and dry. No rash noted. No erythema. No pallor.   Psychiatric: She has a normal mood and affect. Her behavior is normal. Judgment and thought content normal.   Nursing note and vitals reviewed.    Results for orders placed or performed during the hospital encounter of 12/03/19   Comprehensive Metabolic Panel   Result Value Ref Range    Glucose 87 65 - 99 mg/dL    BUN 7 6 - 20 mg/dL    Creatinine 0.69 0.57 - 1.00 mg/dL    Sodium 140 136 - 145 mmol/L    Potassium 3.4 (L) 3.5 - 5.2 mmol/L    Chloride 100 98 - 107 mmol/L    CO2 24.2 22.0 - 29.0 mmol/L    Calcium 9.6 8.6 - 10.5 mg/dL    Total Protein 7.9 6.0 - 8.5 g/dL    Albumin 4.80 3.50 - 5.20 g/dL    ALT (SGPT) 33 1 - 33 U/L    AST (SGOT) 31 1 - 32 U/L    Alkaline Phosphatase 155 (H) 39 - 117 U/L    Total Bilirubin 0.7 0.2 - 1.2 mg/dL    eGFR Non African Amer 95 >60 mL/min/1.73    Globulin 3.1 gm/dL    A/G Ratio 1.5 g/dL    BUN/Creatinine Ratio 10.1 7.0 - 25.0    Anion Gap 15.8 (H) 5.0 - 15.0 mmol/L   Lipase    Result Value Ref Range    Lipase 19 13 - 60 U/L   Pregnancy, Urine - Urine, Clean Catch   Result Value Ref Range    HCG, Urine QL Negative Negative   Urinalysis With Microscopic If Indicated (No Culture) - Urine, Clean Catch   Result Value Ref Range    Color, UA Yellow Yellow, Straw    Appearance, UA Clear Clear    pH, UA 8.5 (H) 4.5 - 8.0    Specific Gravity, UA 1.020 1.003 - 1.030    Glucose, UA Negative Negative    Ketones, UA Negative Negative    Bilirubin, UA Negative Negative    Blood, UA Negative Negative    Protein, UA Negative Negative    Leuk Esterase, UA Negative Negative    Nitrite, UA Negative Negative    Urobilinogen, UA 0.2 E.U./dL 0.2 - 1.0 E.U./dL   CBC Auto Differential   Result Value Ref Range    WBC 9.16 3.40 - 10.80 10*3/mm3    RBC 4.61 3.77 - 5.28 10*6/mm3    Hemoglobin 14.6 12.0 - 15.9 g/dL    Hematocrit 42.7 34.0 - 46.6 %    MCV 92.6 79.0 - 97.0 fL    MCH 31.7 26.6 - 33.0 pg    MCHC 34.2 31.5 - 35.7 g/dL    RDW 11.9 (L) 12.3 - 15.4 %    RDW-SD 40.7 37.0 - 54.0 fl    MPV 9.8 6.0 - 12.0 fL    Platelets 287 140 - 450 10*3/mm3    Neutrophil % 81.2 (H) 42.7 - 76.0 %    Lymphocyte % 13.5 (L) 19.6 - 45.3 %    Monocyte % 4.3 (L) 5.0 - 12.0 %    Eosinophil % 0.1 (L) 0.3 - 6.2 %    Basophil % 0.2 0.0 - 1.5 %    Immature Grans % 0.7 (H) 0.0 - 0.5 %    Neutrophils, Absolute 7.44 (H) 1.70 - 7.00 10*3/mm3    Lymphocytes, Absolute 1.24 0.70 - 3.10 10*3/mm3    Monocytes, Absolute 0.39 0.10 - 0.90 10*3/mm3    Eosinophils, Absolute 0.01 0.00 - 0.40 10*3/mm3    Basophils, Absolute 0.02 0.00 - 0.20 10*3/mm3    Immature Grans, Absolute 0.06 (H) 0.00 - 0.05 10*3/mm3    nRBC 0.0 0.0 - 0.2 /100 WBC       Procedures           ED Course  ED Course as of Dec 04 0104   Wed Dec 04, 2019   0103 I reviewed all the labs from today's visit.  They do not appear to be any acute worrisome metabolic issues here.  Patient's vital signs are quite normal.  Her repeat abdominal exam just now is still benign without any worrisome  "peritoneal signs anywhere.  I think this is very likely a viral gastrointestinal process which should run its course well with typical symptomatic management at home.  I counseled the patient on dietary strategies for the next 24 to 48 hours.  I will give her prescription for Zofran to use as needed.  I reviewed with her the usual \"return to ER\" instructions for any worsening signs or symptoms.  She will be discharged home in good condition now.  [JERRICA]      ED Course User Index  [JERRICA] Ranjit Sommer MD                  MDM  Number of Diagnoses or Management Options     Amount and/or Complexity of Data Reviewed  Clinical lab tests: ordered and reviewed  Decide to obtain previous medical records or to obtain history from someone other than the patient: yes  Review and summarize past medical records: yes    Risk of Complications, Morbidity, and/or Mortality  Presenting problems: moderate  Diagnostic procedures: moderate  Management options: moderate        Final diagnoses:   Generalized abdominal pain   Nausea and vomiting, intractability of vomiting not specified, unspecified vomiting type   Lightheadedness              Ranjit Sommer MD  12/04/19 0104    "

## 2020-02-02 ENCOUNTER — HOSPITAL ENCOUNTER (EMERGENCY)
Facility: HOSPITAL | Age: 40
Discharge: HOME | End: 2020-02-02
Attending: EMERGENCY MEDICINE | Admitting: EMERGENCY MEDICINE

## 2020-02-02 VITALS
TEMPERATURE: 98.3 F | SYSTOLIC BLOOD PRESSURE: 145 MMHG | RESPIRATION RATE: 12 BRPM | WEIGHT: 140 LBS | HEIGHT: 62 IN | OXYGEN SATURATION: 98 % | DIASTOLIC BLOOD PRESSURE: 88 MMHG | HEART RATE: 98 BPM | BODY MASS INDEX: 25.76 KG/M2

## 2020-02-02 DIAGNOSIS — J01.00 ACUTE NON-RECURRENT MAXILLARY SINUSITIS: ICD-10-CM

## 2020-02-02 DIAGNOSIS — H66.90 ACUTE OTITIS MEDIA, UNSPECIFIED OTITIS MEDIA TYPE: Primary | ICD-10-CM

## 2020-02-02 PROCEDURE — 99282 EMERGENCY DEPT VISIT SF MDM: CPT | Performed by: EMERGENCY MEDICINE

## 2020-02-02 PROCEDURE — 99283 EMERGENCY DEPT VISIT LOW MDM: CPT

## 2020-02-02 RX ORDER — AMOXICILLIN 500 MG/1
500 CAPSULE ORAL 3 TIMES DAILY
Qty: 21 CAPSULE | Refills: 0 | Status: SHIPPED | OUTPATIENT
Start: 2020-02-02 | End: 2020-02-09

## 2020-02-02 RX ORDER — AMOXICILLIN 250 MG/1
500 CAPSULE ORAL ONCE
Status: COMPLETED | OUTPATIENT
Start: 2020-02-02 | End: 2020-02-02

## 2020-02-02 RX ADMIN — AMOXICILLIN 500 MG: 250 CAPSULE ORAL at 19:52

## 2020-02-03 NOTE — DISCHARGE INSTRUCTIONS
Take antibiotic as directed.  Please return to the emergency room for any worsening pain, fevers, difficulties breathing or any other concerns.

## 2020-02-03 NOTE — ED PROVIDER NOTES
Subjective   History of Present Illness  History of Present Illness    Chief complaint: Ear pain, sinus congestion    Location: Left ear, left facial sinuses    Quality/Severity: Moderate pressure, dull aching    Timing/Duration: Started yesterday, worse today    Modifying Factors: None    Narrative: This patient presents for evaluation of new onset left ear pain and left sided sinus problems.  She said that yesterday she began having some pain in the left ear.  She pulled her toboggan Over her ear to try and keep air from blowing in there while outdoors.  Then later in evening she began having some pressure radiating towards the left side of her face and left jaw area.  She says she feels very congested in her left nostril and cannot breathe through that side.  She has not had any fevers.  She denies any cough or difficulty breathing.  There have been no known sick contacts.  She is not a smoker.    Associated Symptoms: As above    Review of Systems   Constitutional: Negative for activity change, diaphoresis and fever.   HENT: Positive for congestion, ear pain, rhinorrhea, sinus pressure and sinus pain. Negative for ear discharge, mouth sores, nosebleeds, trouble swallowing and voice change.    Respiratory: Negative for cough and shortness of breath.    Cardiovascular: Negative for chest pain.   Gastrointestinal: Negative for abdominal pain and vomiting.   Skin: Negative for color change and rash.   Neurological: Negative for syncope.   All other systems reviewed and are negative.      Past Medical History:   Diagnosis Date   • ADHD    • Anxiety    • Asthma    • Chest pain, midsternal     states w/anxiety & activity, relieved w/tums, not sure if reflux or d/t anxiety   • Depression    • History of acquired CHF (congestive heart failure) 2015    with/after pregnancy   • History of gestational hypertension    • History of transfusion 2015    after delivery   • Hx gestational diabetes 2015   • Low back pain     right,  h/o slipped disc   • Personal history of cardiomyopathy 2015    w/pregnancy, states treated by Dr Ortiz at [facility]       No Known Allergies    Past Surgical History:   Procedure Laterality Date   •  SECTION      x3   • CHIN IMPLANT INSERTION  2010    w/jaw surgery   • D&C HYSTEROSCOPY ENDOMETRIAL ABLATION N/A 2019    Procedure: INTRAUTERINE DEVICE REMOVAL  DIAGNOSTIC HYSTEROSCOPY, DILATATION AND CURETTAGE, NOVASURE ENDOMETRIAL ABLATION.;  Surgeon: Elvis Andre MD;  Location: [facility];  Service: Obstetrics/Gynecology   • TUBAL ABDOMINAL LIGATION         Family History   Problem Relation Age of Onset   • Breast cancer Mother    • Kidney failure Mother    • Diabetes Mother    • Heart failure Mother    • Hypertension Mother    • Asthma Father    • Sleep apnea Father    • Heart failure Maternal Grandfather    • Malig Hyperthermia Neg Hx        Social History     Socioeconomic History   • Marital status:      Spouse name: Not on file   • Number of children: Not on file   • Years of education: Not on file   • Highest education level: Not on file   Tobacco Use   • Smoking status: Former Smoker     Years: 15.00     Types: Cigarettes     Last attempt to quit:      Years since quittin.0   • Smokeless tobacco: Never Used   • Tobacco comment: states not sure how much she smoked   Substance and Sexual Activity   • Alcohol use: No     Comment: caff use   • Drug use: No   • Sexual activity: Defer       ED Triage Vitals [20 1911]   Temp Heart Rate Resp BP SpO2   98.3 °F (36.8 °C) 98 12 145/88 98 %      Temp src Heart Rate Source Patient Position BP Location FiO2 (%)   Oral Monitor Sitting Right arm --         Objective   Physical Exam   Constitutional: She is oriented to person, place, and time. She appears well-developed and well-nourished. No distress.   HENT:   Head: Normocephalic and atraumatic.   Right Ear: External ear normal.   Mouth/Throat: Oropharynx is clear and moist.  "  Right tympanic membrane appears normal.  Left tympanic membrane is erythematous and inflamed with some mild fluid behind the membrane.  Canal appears normal.  Patient does have some tenderness along the left maxillary sinuses.  No facial erythema or induration of the soft tissues notable.   Eyes: Pupils are equal, round, and reactive to light. EOM are normal. Right eye exhibits no discharge. Left eye exhibits no discharge.   Neck: Normal range of motion. Neck supple. No tracheal deviation present.   Cardiovascular: Normal rate, regular rhythm, normal heart sounds and intact distal pulses. Exam reveals no gallop and no friction rub.   No murmur heard.  Pulmonary/Chest: Effort normal and breath sounds normal. No stridor. No respiratory distress. She has no wheezes. She has no rales.   Musculoskeletal: Normal range of motion. She exhibits no edema or deformity.   Neurological: She is alert and oriented to person, place, and time. She exhibits normal muscle tone.   Skin: Skin is warm and dry. No rash noted. She is not diaphoretic. No erythema.   Psychiatric: She has a normal mood and affect. Her behavior is normal. Judgment and thought content normal.   Nursing note and vitals reviewed.      Procedures           ED Course  ED Course as of Feb 02 2055   Sun Feb 02, 2020 2054 Patient presented with ear pain and sinus pain.  Her examination was consistent with otitis media and sinusitis.  I started her on a course of amoxicillin here.  Advised follow-up with her primary care doctor this week.  Discharged home in good condition with usual \"return to ER\" instructions for any worsening signs or symptoms.    [JERRICA]      ED Course User Index  [JERRICA] Ranjit Sommer MD                                               [facility]    Final diagnoses:   Acute otitis media, unspecified otitis media type   Acute non-recurrent maxillary sinusitis            Ranjit Sommer MD  02/02/20 2055    "

## 2020-02-29 ENCOUNTER — APPOINTMENT (OUTPATIENT)
Dept: GENERAL RADIOLOGY | Facility: HOSPITAL | Age: 40
End: 2020-02-29

## 2020-02-29 ENCOUNTER — HOSPITAL ENCOUNTER (EMERGENCY)
Facility: HOSPITAL | Age: 40
Discharge: HOME | End: 2020-02-29
Attending: EMERGENCY MEDICINE | Admitting: EMERGENCY MEDICINE

## 2020-02-29 VITALS
OXYGEN SATURATION: 100 % | BODY MASS INDEX: 27.05 KG/M2 | TEMPERATURE: 98.4 F | HEART RATE: 86 BPM | HEIGHT: 62 IN | WEIGHT: 147 LBS | RESPIRATION RATE: 18 BRPM | DIASTOLIC BLOOD PRESSURE: 98 MMHG | SYSTOLIC BLOOD PRESSURE: 142 MMHG

## 2020-02-29 DIAGNOSIS — S39.012A BACK STRAIN, INITIAL ENCOUNTER: ICD-10-CM

## 2020-02-29 DIAGNOSIS — S16.1XXA STRAIN OF NECK MUSCLE, INITIAL ENCOUNTER: Primary | ICD-10-CM

## 2020-02-29 DIAGNOSIS — S70.02XA CONTUSION OF LEFT HIP, INITIAL ENCOUNTER: ICD-10-CM

## 2020-02-29 PROCEDURE — 72110 X-RAY EXAM L-2 SPINE 4/>VWS: CPT

## 2020-02-29 PROCEDURE — 25010000002 KETOROLAC TROMETHAMINE PER 15 MG: Performed by: EMERGENCY MEDICINE

## 2020-02-29 PROCEDURE — 71101 X-RAY EXAM UNILAT RIBS/CHEST: CPT

## 2020-02-29 PROCEDURE — 72050 X-RAY EXAM NECK SPINE 4/5VWS: CPT

## 2020-02-29 PROCEDURE — 73502 X-RAY EXAM HIP UNI 2-3 VIEWS: CPT

## 2020-02-29 PROCEDURE — 96372 THER/PROPH/DIAG INJ SC/IM: CPT

## 2020-02-29 PROCEDURE — 99282 EMERGENCY DEPT VISIT SF MDM: CPT

## 2020-02-29 PROCEDURE — 99282 EMERGENCY DEPT VISIT SF MDM: CPT | Performed by: EMERGENCY MEDICINE

## 2020-02-29 RX ORDER — CYCLOBENZAPRINE HCL 10 MG
10 TABLET ORAL 3 TIMES DAILY PRN
Qty: 21 TABLET | Refills: 0 | Status: SHIPPED | OUTPATIENT
Start: 2020-02-29 | End: 2020-03-07

## 2020-02-29 RX ORDER — NABUMETONE 750 MG/1
750 TABLET, FILM COATED ORAL 2 TIMES DAILY PRN
Qty: 14 TABLET | Refills: 0 | Status: SHIPPED | OUTPATIENT
Start: 2020-02-29 | End: 2020-03-07

## 2020-02-29 RX ORDER — KETOROLAC TROMETHAMINE 30 MG/ML
60 INJECTION, SOLUTION INTRAMUSCULAR; INTRAVENOUS ONCE
Status: COMPLETED | OUTPATIENT
Start: 2020-02-29 | End: 2020-02-29

## 2020-02-29 RX ADMIN — KETOROLAC TROMETHAMINE 60 MG: 30 INJECTION, SOLUTION INTRAMUSCULAR at 10:26

## 2020-04-28 ENCOUNTER — HOSPITAL ENCOUNTER (EMERGENCY)
Facility: HOSPITAL | Age: 40
Discharge: HOME | End: 2020-04-28
Attending: EMERGENCY MEDICINE | Admitting: EMERGENCY MEDICINE

## 2020-04-28 VITALS
HEART RATE: 87 BPM | WEIGHT: 147 LBS | TEMPERATURE: 98.6 F | DIASTOLIC BLOOD PRESSURE: 92 MMHG | SYSTOLIC BLOOD PRESSURE: 150 MMHG | HEIGHT: 62 IN | OXYGEN SATURATION: 100 % | RESPIRATION RATE: 18 BRPM | BODY MASS INDEX: 27.05 KG/M2

## 2020-04-28 DIAGNOSIS — J45.21 MILD INTERMITTENT ASTHMA WITH EXACERBATION: Primary | ICD-10-CM

## 2020-04-28 PROCEDURE — 99283 EMERGENCY DEPT VISIT LOW MDM: CPT

## 2020-04-28 PROCEDURE — 93005 ELECTROCARDIOGRAM TRACING: CPT | Performed by: EMERGENCY MEDICINE

## 2020-04-28 PROCEDURE — 99284 EMERGENCY DEPT VISIT MOD MDM: CPT | Performed by: EMERGENCY MEDICINE

## 2020-04-28 PROCEDURE — 63710000001 PREDNISONE PER 1 MG: Performed by: EMERGENCY MEDICINE

## 2020-04-28 PROCEDURE — 93010 ELECTROCARDIOGRAM REPORT: CPT | Performed by: INTERNAL MEDICINE

## 2020-04-28 RX ORDER — ALBUTEROL SULFATE 90 UG/1
2 AEROSOL, METERED RESPIRATORY (INHALATION) EVERY 4 HOURS PRN
COMMUNITY
End: 2022-07-19

## 2020-04-28 RX ORDER — PREDNISONE 20 MG/1
40 TABLET ORAL DAILY
Qty: 8 TABLET | Refills: 0 | Status: SHIPPED | OUTPATIENT
Start: 2020-04-29 | End: 2020-05-03

## 2020-04-28 RX ORDER — BUDESONIDE AND FORMOTEROL FUMARATE DIHYDRATE 160; 4.5 UG/1; UG/1
2 AEROSOL RESPIRATORY (INHALATION)
COMMUNITY
End: 2020-07-14

## 2020-04-28 RX ORDER — PREDNISONE 20 MG/1
40 TABLET ORAL ONCE
Status: COMPLETED | OUTPATIENT
Start: 2020-04-28 | End: 2020-04-28

## 2020-04-28 RX ORDER — INHALER, ASSIST DEVICES
SPACER (EA) MISCELLANEOUS
Qty: 1 EACH | Refills: 0 | Status: SHIPPED | OUTPATIENT
Start: 2020-04-28 | End: 2020-11-10

## 2020-04-28 RX ADMIN — PREDNISONE 40 MG: 20 TABLET ORAL at 22:57

## 2020-05-03 ENCOUNTER — APPOINTMENT (OUTPATIENT)
Dept: GENERAL RADIOLOGY | Facility: HOSPITAL | Age: 40
End: 2020-05-03

## 2020-05-03 ENCOUNTER — HOSPITAL ENCOUNTER (EMERGENCY)
Facility: HOSPITAL | Age: 40
Discharge: HOME | End: 2020-05-03
Attending: EMERGENCY MEDICINE | Admitting: EMERGENCY MEDICINE

## 2020-05-03 VITALS
RESPIRATION RATE: 18 BRPM | WEIGHT: 147 LBS | SYSTOLIC BLOOD PRESSURE: 129 MMHG | HEART RATE: 76 BPM | BODY MASS INDEX: 27.05 KG/M2 | TEMPERATURE: 98.4 F | DIASTOLIC BLOOD PRESSURE: 90 MMHG | HEIGHT: 62 IN | OXYGEN SATURATION: 96 %

## 2020-05-03 DIAGNOSIS — R07.89 ATYPICAL CHEST PAIN: ICD-10-CM

## 2020-05-03 DIAGNOSIS — R06.00 DYSPNEA, UNSPECIFIED TYPE: Primary | ICD-10-CM

## 2020-05-03 PROCEDURE — 99282 EMERGENCY DEPT VISIT SF MDM: CPT

## 2020-05-03 PROCEDURE — 93005 ELECTROCARDIOGRAM TRACING: CPT | Performed by: EMERGENCY MEDICINE

## 2020-05-03 PROCEDURE — 93005 ELECTROCARDIOGRAM TRACING: CPT

## 2020-05-03 PROCEDURE — 99282 EMERGENCY DEPT VISIT SF MDM: CPT | Performed by: EMERGENCY MEDICINE

## 2020-05-03 PROCEDURE — 93010 ELECTROCARDIOGRAM REPORT: CPT | Performed by: INTERNAL MEDICINE

## 2020-05-03 PROCEDURE — 71046 X-RAY EXAM CHEST 2 VIEWS: CPT

## 2020-05-03 RX ORDER — PANTOPRAZOLE SODIUM 20 MG/1
20 TABLET, DELAYED RELEASE ORAL 2 TIMES DAILY
Qty: 60 TABLET | Refills: 0 | Status: SHIPPED | OUTPATIENT
Start: 2020-05-03 | End: 2021-05-24 | Stop reason: SDUPTHER

## 2020-05-03 NOTE — ED PROVIDER NOTES
"Subjective     Shortness of Breath   Severity:  Moderate  Onset quality:  Gradual  Timing:  Intermittent  Progression:  Waxing and waning  Chronicity:  Recurrent  Context comment:  Seen by pcp for same sats diag with bronchial\" prblem, today c/o dyspnea at work   Relieved by:  Nothing  Worsened by:  Nothing  Ineffective treatments: steroid/inhaler.  Associated symptoms: cough    Associated symptoms: no abdominal pain, no diaphoresis, no fever, no neck pain, no rash, no vomiting and no wheezing        Review of Systems   Constitutional: Negative for diaphoresis and fever.   Respiratory: Positive for cough and shortness of breath. Negative for wheezing.    Gastrointestinal: Negative for abdominal pain and vomiting.   Musculoskeletal: Negative for neck pain.   Skin: Negative for rash.   All other systems reviewed and are negative.      Past Medical History:   Diagnosis Date   • ADHD    • Anxiety    • Asthma    • Chest pain, midsternal     states w/anxiety & activity, relieved w/tums, not sure if reflux or d/t anxiety   • Depression    • History of acquired CHF (congestive heart failure)     with/after pregnancy   • History of gestational hypertension    • History of transfusion     after delivery   • Hx gestational diabetes    • Low back pain     right, h/o slipped disc   • Personal history of cardiomyopathy     w/pregnancy, states treated by Dr Ortiz at [facility]       No Known Allergies    Past Surgical History:   Procedure Laterality Date   •  SECTION      x3   • CHIN IMPLANT INSERTION      w/jaw surgery   • D&C HYSTEROSCOPY ENDOMETRIAL ABLATION N/A 2019    Procedure: INTRAUTERINE DEVICE REMOVAL  DIAGNOSTIC HYSTEROSCOPY, DILATATION AND CURETTAGE, NOVASURE ENDOMETRIAL ABLATION.;  Surgeon: Elvis Andre MD;  Location: [facility] OR;  Service: Obstetrics/Gynecology   • TUBAL ABDOMINAL LIGATION         Family History   Problem Relation Age of Onset   • Breast cancer Mother    • " Kidney failure Mother    • Diabetes Mother    • Heart failure Mother    • Hypertension Mother    • Asthma Father    • Sleep apnea Father    • Heart failure Maternal Grandfather    • Malig Hyperthermia Neg Hx        Social History     Socioeconomic History   • Marital status:      Spouse name: Not on file   • Number of children: Not on file   • Years of education: Not on file   • Highest education level: Not on file   Tobacco Use   • Smoking status: Former Smoker     Years: 15.00     Types: Cigarettes     Last attempt to quit: 2015     Years since quittin.3   • Smokeless tobacco: Never Used   • Tobacco comment: states not sure how much she smoked   Substance and Sexual Activity   • Alcohol use: No     Comment: caff use   • Drug use: No   • Sexual activity: Yes     Partners: Male     Birth control/protection: None, Surgical           Objective   Physical Exam   Constitutional: She appears well-developed and well-nourished.  Non-toxic appearance. She does not appear ill. No distress.   Cardiovascular: Normal rate and regular rhythm. Exam reveals no friction rub and no decreased pulses.   No murmur heard.  Pulmonary/Chest: Effort normal and breath sounds normal. No accessory muscle usage. No tachypnea. She has no decreased breath sounds. She has no wheezes. She has no rhonchi. She has no rales.   Abdominal: Soft. She exhibits no distension. There is no rebound and no guarding.   Nursing note and vitals reviewed.      Procedures           ED Course  ED Course as of May 03 1420   Sun May 03, 2020   1314 Ekg by me nsr, poss lvh, non spec t changes    [BC]      ED Course User Index  [BC] Eric Pickens MD         ok with plan to f/u pcp and return for worse                                  MDM  Number of Diagnoses or Management Options  Atypical chest pain:   Dyspnea, unspecified type:      Amount and/or Complexity of Data Reviewed  Tests in the radiology section of CPT®: ordered and reviewed    Risk of  Complications, Morbidity, and/or Mortality  Presenting problems: moderate  Diagnostic procedures: moderate  Management options: moderate    Patient Progress  Patient progress: stable      Final diagnoses:   Dyspnea, unspecified type   Atypical chest pain            Eric Pickens MD  05/03/20 7407

## 2020-05-03 NOTE — ED NOTES
Pt has been in  waiting for xray results, pt has had no coughing while waiting, has been on phone, appears in no distress, dr garcia talking with pt currently in      Shira Ponce, RN  05/03/20 6145

## 2020-09-25 ENCOUNTER — HOSPITAL ENCOUNTER (EMERGENCY)
Facility: HOSPITAL | Age: 40
Discharge: HOME | End: 2020-09-25
Attending: EMERGENCY MEDICINE | Admitting: EMERGENCY MEDICINE

## 2020-09-25 VITALS
HEIGHT: 62 IN | HEART RATE: 88 BPM | RESPIRATION RATE: 18 BRPM | BODY MASS INDEX: 25.06 KG/M2 | DIASTOLIC BLOOD PRESSURE: 95 MMHG | SYSTOLIC BLOOD PRESSURE: 141 MMHG | OXYGEN SATURATION: 100 % | TEMPERATURE: 98.5 F

## 2020-09-25 DIAGNOSIS — N39.0 ACUTE UTI: Primary | ICD-10-CM

## 2020-09-25 LAB
BACTERIA UR QL AUTO: ABNORMAL /HPF
BILIRUB UR QL STRIP: NEGATIVE
CLARITY UR: ABNORMAL
COLOR UR: YELLOW
GLUCOSE UR STRIP-MCNC: NEGATIVE MG/DL
HGB UR QL STRIP.AUTO: ABNORMAL
HYALINE CASTS UR QL AUTO: ABNORMAL /LPF
KETONES UR QL STRIP: ABNORMAL
LEUKOCYTE ESTERASE UR QL STRIP.AUTO: ABNORMAL
NITRITE UR QL STRIP: NEGATIVE
PH UR STRIP.AUTO: 6 [PH] (ref 5–8)
PROT UR QL STRIP: NEGATIVE
RBC # UR: ABNORMAL /HPF
REF LAB TEST METHOD: ABNORMAL
SP GR UR STRIP: 1.02 (ref 1–1.03)
SQUAMOUS #/AREA URNS HPF: ABNORMAL /HPF
UROBILINOGEN UR QL STRIP: ABNORMAL
WBC UR QL AUTO: ABNORMAL /HPF

## 2020-09-25 PROCEDURE — 81001 URINALYSIS AUTO W/SCOPE: CPT | Performed by: EMERGENCY MEDICINE

## 2020-09-25 PROCEDURE — 99283 EMERGENCY DEPT VISIT LOW MDM: CPT

## 2020-09-25 PROCEDURE — 87086 URINE CULTURE/COLONY COUNT: CPT | Performed by: EMERGENCY MEDICINE

## 2020-09-25 RX ORDER — PHENAZOPYRIDINE HYDROCHLORIDE 200 MG/1
200 TABLET, FILM COATED ORAL 3 TIMES DAILY PRN
Qty: 6 TABLET | Refills: 0 | Status: SHIPPED | OUTPATIENT
Start: 2020-09-25 | End: 2021-06-29

## 2020-09-25 RX ORDER — LEVOFLOXACIN 250 MG/1
250 TABLET ORAL DAILY
Qty: 3 TABLET | Refills: 0 | Status: SHIPPED | OUTPATIENT
Start: 2020-09-25 | End: 2020-09-28

## 2020-09-26 LAB — BACTERIA SPEC AEROBE CULT: NO GROWTH

## 2020-09-26 NOTE — ED PROVIDER NOTES
EMERGENCY DEPARTMENT ENCOUNTER    Room Number:    Date of encounter:  2020  PCP: Ana Laura aRmos APRN  Historian: Patient      HPI:  Chief Complaint: Bladder pain, urinary urgency/frequency and dysuria  A complete HPI/ROS/PMH/PSH/SH/FH are unobtainable due to: N/A    Context: Talisha Zepeda is a 40 y.o. female who presents to the ED c/o 4 to 5 days worth of bladder pain, urinary urgency frequency and dysuria.  No fevers or chills.  No nausea, vomiting or diarrhea.  No vaginal discharge.  She does not have periods and is status post endometrial ablation and bilateral tubal ligation.  She saw her primary care doctor this week and was prescribed Macrobid, but she has had no relief in her symptoms.      The patient was placed in a mask in triage, hand hygiene was performed before and after my interaction with the patient.  I wore a mask, safety glasses and gloves during my entire interaction with the patient.    PAST MEDICAL HISTORY  Active Ambulatory Problems     Diagnosis Date Noted   • Peripartum cardiomyopathy 2018   • Bilateral ovarian cysts 10/10/2018   • Pelvic pain in female 10/10/2018   • H/O bilateral salpingectomy 10/10/2018   • IUD contraception-Mirena 2018   • HPV in female 2018   • Menorrhagia with irregular cycle 2019   • History of 3  sections: pt denies any classical sections 2019     Resolved Ambulatory Problems     Diagnosis Date Noted   • No Resolved Ambulatory Problems     Past Medical History:   Diagnosis Date   • ADHD    • Anxiety    • Asthma    • Chest pain, midsternal    • Depression    • History of acquired CHF (congestive heart failure)    • History of gestational hypertension    • History of transfusion    • Hx gestational diabetes    • Low back pain    • Personal history of cardiomyopathy          PAST SURGICAL HISTORY  Past Surgical History:   Procedure Laterality Date   •  SECTION      x3   • CHIN IMPLANT INSERTION       w/jaw surgery   • D&C HYSTEROSCOPY ENDOMETRIAL ABLATION N/A 2019    Procedure: INTRAUTERINE DEVICE REMOVAL  DIAGNOSTIC HYSTEROSCOPY, DILATATION AND CURETTAGE, NOVASURE ENDOMETRIAL ABLATION.;  Surgeon: Elvis Andre MD;  Location: [facility];  Service: Obstetrics/Gynecology   • TUBAL ABDOMINAL LIGATION           FAMILY HISTORY  Family History   Problem Relation Age of Onset   • Breast cancer Mother    • Kidney failure Mother    • Diabetes Mother    • Heart failure Mother    • Hypertension Mother    • Asthma Father    • Sleep apnea Father    • Heart failure Maternal Grandfather    • Malig Hyperthermia Neg Hx          SOCIAL HISTORY  Social History     Socioeconomic History   • Marital status:      Spouse name: Not on file   • Number of children: Not on file   • Years of education: Not on file   • Highest education level: Not on file   Tobacco Use   • Smoking status: Former Smoker     Years: 15.00     Types: Cigarettes     Quit date:      Years since quittin.7   • Smokeless tobacco: Never Used   • Tobacco comment: states not sure how much she smoked   Substance and Sexual Activity   • Alcohol use: No     Comment: caff use   • Drug use: No   • Sexual activity: Yes     Partners: Male     Birth control/protection: None, Surgical         ALLERGIES  Patient has no known allergies.        REVIEW OF SYSTEMS  Review of Systems   Constitutional: Negative for fever.   HENT: Negative for sore throat.    Eyes: Negative.    Respiratory: Negative for cough and shortness of breath.    Cardiovascular: Negative for chest pain.   Gastrointestinal: Negative for abdominal pain, diarrhea and vomiting.   Genitourinary: Positive for difficulty urinating, dysuria, frequency, pelvic pain and urgency. Negative for flank pain.   Musculoskeletal: Negative for neck pain.   Skin: Negative for rash.   Allergic/Immunologic: Negative.    Neurological: Negative for weakness, numbness and headaches.    Hematological: Negative.    Psychiatric/Behavioral: Negative.    All other systems reviewed and are negative.       All systems reviewed and negative except for those discussed in HPI.       PHYSICAL EXAM    I have reviewed the triage vital signs and nursing notes.    ED Triage Vitals [09/25/20 2114]   Temp Heart Rate Resp BP SpO2   98.7 °F (37.1 °C) 109 18 -- 99 %      Temp src Heart Rate Source Patient Position BP Location FiO2 (%)   -- -- -- -- --       Physical Exam   Constitutional: Pt. is oriented to person, place, and time and well-developed, well-nourished, and in no distress. No distress.   HENT: Normocephalic and atraumatic.  Neck: Normal range of motion. Neck supple. t. No tracheal deviation present. Cardiovascular: Normal rate, regular rhythm and normal heart sounds. Exam reveals no gallop and no friction rub.   No murmur heard.  Pulmonary/Chest: Effort normal and breath sounds normal. No stridor. No respiratory distress. No wheezes, no rales.   Abdominal: Soft. Bowel sounds are normal. No distension. There is minimal suprapubic tenderness. There is no rebound and no guarding.  No CVA tenderness to palpation   Neurological: Pt. is alert and oriented to person, place, and time.  Skin: Skin is warm and dry. No rash noted. Pt. is not diaphoretic. No erythema.   Psychiatric: Mood, affect and judgment normal.   Nursing note and vitals reviewed.        LAB RESULTS  Recent Results (from the past 24 hour(s))   Urinalysis With Culture If Indicated - Urine, Clean Catch    Collection Time: 09/25/20  9:45 PM    Specimen: Urine, Clean Catch   Result Value Ref Range    Color, UA Yellow Yellow, Straw    Appearance, UA Cloudy (A) Clear    pH, UA 6.0 5.0 - 8.0    Specific Gravity, UA 1.025 1.005 - 1.030    Glucose, UA Negative Negative    Ketones, UA Trace (A) Negative    Bilirubin, UA Negative Negative    Blood, UA Moderate (2+) (A) Negative    Protein, UA Negative Negative    Leuk Esterase, UA Small (1+) (A) Negative     Nitrite, UA Negative Negative    Urobilinogen, UA 1.0 E.U./dL 0.2 - 1.0 E.U./dL   Urinalysis, Microscopic Only - Urine, Clean Catch    Collection Time: 09/25/20  9:45 PM    Specimen: Urine, Clean Catch   Result Value Ref Range    RBC, UA 31-50 (A) None Seen, 0-2 /HPF    WBC, UA Too Numerous to Count (A) None Seen, 0-2 /HPF    Bacteria, UA None Seen None Seen /HPF    Squamous Epithelial Cells, UA 0-2 None Seen, 0-2 /HPF    Hyaline Casts, UA 0-2 None Seen /LPF    Methodology Automated Microscopy        Ordered the above labs and independently reviewed the results.        RADIOLOGY  No Radiology Exams Resulted Within Past 24 Hours    I ordered the above noted radiological studies. Reviewed by me and discussed with radiologist.  See dictation for official radiology interpretation.      PROCEDURES    Procedures      MEDICATIONS GIVEN IN ER    Medications - No data to display      PROGRESS, DATA ANALYSIS, CONSULTS, AND MEDICAL DECISION MAKING    Any/all labs have been independently reviewed by me.  Any/all radiology studies have been reviewed by me and discussed with radiologist dictating the report.   EKG's independently viewed and interpreted by me.  Discussion below represents my analysis of pertinent findings related to patient's condition, differential diagnosis, treatment plan and final disposition.      ED Course as of Sep 25 2210   Fri Sep 25, 2020   2208 WBC, UA(!): Too Numerous to Count [WC]   2208 Bacteria, UA: None Seen [WC]   2208 Leukocytes, UA(!): Small (1+) [WC]   2208 Likely partially treated UTI- I'm going to prescribe her a short course of Levaquin.    [WC]      ED Course User Index  [WC] Subhash Welch MD       AS OF 22:10 EDT VITALS:    BP -    HR - 109  TEMP - 98.7 °F (37.1 °C)  02 SATS - 99%        DIAGNOSIS  Final diagnoses:   Acute UTI         DISPOSITION  Discharged           Subhash Welch MD  09/25/20 2210

## 2020-11-10 ENCOUNTER — OFFICE VISIT (OUTPATIENT)
Dept: FAMILY MEDICINE CLINIC | Facility: CLINIC | Age: 40
End: 2020-11-10

## 2020-11-10 VITALS
HEIGHT: 62 IN | SYSTOLIC BLOOD PRESSURE: 124 MMHG | OXYGEN SATURATION: 98 % | BODY MASS INDEX: 22.63 KG/M2 | DIASTOLIC BLOOD PRESSURE: 70 MMHG | TEMPERATURE: 98.7 F | RESPIRATION RATE: 18 BRPM | HEART RATE: 84 BPM | WEIGHT: 123 LBS

## 2020-11-10 DIAGNOSIS — K21.9 GASTROESOPHAGEAL REFLUX DISEASE, UNSPECIFIED WHETHER ESOPHAGITIS PRESENT: ICD-10-CM

## 2020-11-10 DIAGNOSIS — G47.00 INSOMNIA, UNSPECIFIED TYPE: ICD-10-CM

## 2020-11-10 DIAGNOSIS — F98.8 ATTENTION DEFICIT DISORDER, UNSPECIFIED HYPERACTIVITY PRESENCE: Primary | ICD-10-CM

## 2020-11-10 PROCEDURE — 99204 OFFICE O/P NEW MOD 45 MIN: CPT | Performed by: FAMILY MEDICINE

## 2020-11-10 RX ORDER — TRAZODONE HYDROCHLORIDE 100 MG/1
100 TABLET ORAL NIGHTLY PRN
Qty: 90 TABLET | Refills: 2 | Status: SHIPPED | OUTPATIENT
Start: 2020-11-10 | End: 2022-06-14

## 2020-11-10 NOTE — PROGRESS NOTES
"SUBJECTIVE:  The patient is a 40-year-old white female.  She has not been in this office for many years.  She has ADD and insomnia.  She has GERD.  She just moved back to the area.  She is here to establish care.  She has a previous diagnosis of asthma and gestational diabetes.  Social history-reviewed  Family history-reviewed  Surgical history-reviewed  Allergies-none known  PAST MEDICAL HISTORY:  Reviewed.    REVIEW OF SYSTEMS:  Please see above.  All others reviewed and are negative.      OBJECTIVE:   /70 (BP Location: Left arm, Patient Position: Sitting)   Pulse 84   Temp 98.7 °F (37.1 °C) (Infrared)   Resp 18   Ht 157.5 cm (62\")   Wt 55.8 kg (123 lb)   SpO2 98%   BMI 22.50 kg/m²    Vitals signs are reviewed and are stable.    General:  Well-nourished.  Alert and oriented x3 in no acute distress.  HEENT: PERRLA.   Neck:  Supple.   Lungs:  Clear.    Heart:  Regular rate and rhythm.   Abdomen:   Soft, nontender.   Extremities:  No cyanosis, clubbing or edema.   Neurological:  Grossly intact without motor or sensory deficits.     ASSESSMENT:      Diagnoses and all orders for this visit:    1. Attention deficit disorder, unspecified hyperactivity presence (Primary)    2. Insomnia, unspecified type    3. Gastroesophageal reflux disease, unspecified whether esophagitis present    Other orders  -     traZODone (DESYREL) 100 MG tablet; Take 1 tablet by mouth At Night As Needed for Sleep.  Dispense: 90 tablet; Refill: 2         PLAN: We will get copies of the patient's medical records.  Medication refilled.  She will call if any problems.    Dictated utilizing Dragon dictation.    "

## 2020-11-28 ENCOUNTER — APPOINTMENT (OUTPATIENT)
Dept: GENERAL RADIOLOGY | Facility: HOSPITAL | Age: 40
End: 2020-11-28

## 2020-11-28 ENCOUNTER — HOSPITAL ENCOUNTER (EMERGENCY)
Facility: HOSPITAL | Age: 40
Discharge: HOME | End: 2020-11-28
Attending: EMERGENCY MEDICINE | Admitting: EMERGENCY MEDICINE

## 2020-11-28 VITALS
DIASTOLIC BLOOD PRESSURE: 90 MMHG | OXYGEN SATURATION: 99 % | TEMPERATURE: 98.1 F | HEART RATE: 80 BPM | RESPIRATION RATE: 12 BRPM | BODY MASS INDEX: 20.83 KG/M2 | HEIGHT: 62 IN | WEIGHT: 113.2 LBS | SYSTOLIC BLOOD PRESSURE: 139 MMHG

## 2020-11-28 DIAGNOSIS — M25.562 ACUTE PAIN OF LEFT KNEE: Primary | ICD-10-CM

## 2020-11-28 PROCEDURE — 99283 EMERGENCY DEPT VISIT LOW MDM: CPT

## 2020-11-28 PROCEDURE — 73560 X-RAY EXAM OF KNEE 1 OR 2: CPT

## 2020-11-28 RX ORDER — IBUPROFEN 600 MG/1
600 TABLET ORAL EVERY 6 HOURS PRN
Qty: 40 TABLET | Refills: 0 | Status: SHIPPED | OUTPATIENT
Start: 2020-11-28 | End: 2021-06-29

## 2020-12-02 ENCOUNTER — OFFICE VISIT (OUTPATIENT)
Dept: FAMILY MEDICINE CLINIC | Facility: CLINIC | Age: 40
End: 2020-12-02

## 2020-12-02 VITALS
OXYGEN SATURATION: 99 % | BODY MASS INDEX: 20.7 KG/M2 | TEMPERATURE: 97.5 F | RESPIRATION RATE: 18 BRPM | HEIGHT: 62 IN | HEART RATE: 76 BPM | DIASTOLIC BLOOD PRESSURE: 70 MMHG | SYSTOLIC BLOOD PRESSURE: 124 MMHG

## 2020-12-02 DIAGNOSIS — M25.562 ACUTE PAIN OF LEFT KNEE: Primary | ICD-10-CM

## 2020-12-02 PROCEDURE — 99213 OFFICE O/P EST LOW 20 MIN: CPT | Performed by: FAMILY MEDICINE

## 2020-12-02 NOTE — PROGRESS NOTES
"SUBJECTIVE:  The patient is a 40-year-old white female.  She is here because of injury to her knee.  She was seen in emergency room on November 28 for this.  I reviewed the emergency room note.  Her job requires her to walk and do physical activity at the local [facility].    PAST MEDICAL HISTORY:  Reviewed.    REVIEW OF SYSTEMS:  Please see above.  All others reviewed and are negative.      OBJECTIVE:   /70 (BP Location: Left arm, Patient Position: Sitting)   Pulse 76   Temp 97.5 °F (36.4 °C) (Infrared)   Resp 18   Ht 157.5 cm (62\")   SpO2 99%   BMI 20.70 kg/m²    Vitals signs are reviewed and are stable.    General:  Well-nourished.  Alert and oriented x3 in no acute distress.  HEENT: PERRLA.   Neck:  Supple.   Lungs:  Clear.    Heart:  Regular rate and rhythm.   Abdomen:   Soft, nontender.   Extremities:  No cyanosis, clubbing or edema.  Tenderness is present laterally and posteriorly.  No obvious swelling.  She experiences quite a bit of discomfort when flexing.  No obviousl signs of internal derangement.  Neurological:  Grossly intact without motor or sensory deficits.     ASSESSMENT:      Diagnoses and all orders for this visit:    1. Acute pain of left knee (Primary)         PLAN: She will see her orthopedist tomorrow.  She remain off work until evaluation by him.    Dictated utilizing Dragon dictation.    "

## 2020-12-08 RX ORDER — DEXTROAMPHETAMINE SACCHARATE, AMPHETAMINE ASPARTATE MONOHYDRATE, DEXTROAMPHETAMINE SULFATE AND AMPHETAMINE SULFATE 5; 5; 5; 5 MG/1; MG/1; MG/1; MG/1
20 CAPSULE, EXTENDED RELEASE ORAL EVERY MORNING
Qty: 30 CAPSULE | Refills: 0 | Status: SHIPPED | OUTPATIENT
Start: 2020-12-08 | End: 2020-12-30 | Stop reason: SDUPTHER

## 2020-12-08 NOTE — TELEPHONE ENCOUNTER
Caller: Talisha Zepeda    Relationship: Self    Best call back number: [phone]    Medication needed:   Requested Prescriptions     Pending Prescriptions Disp Refills   • amphetamine-dextroamphetamine XR (ADDERALL XR) 20 MG 24 hr capsule        Sig: Take 1 capsule by mouth Every Morning       When do you need the refill by: ASAP    Does the patient have less than a 3 day supply:  [x] Yes  [] No    What is the patient's preferred pharmacy: [facility] #[id number] [facility] [address] AT [facility] [address] [facility] [phone] [facility] [phone]

## 2020-12-30 RX ORDER — DEXTROAMPHETAMINE SACCHARATE, AMPHETAMINE ASPARTATE MONOHYDRATE, DEXTROAMPHETAMINE SULFATE AND AMPHETAMINE SULFATE 5; 5; 5; 5 MG/1; MG/1; MG/1; MG/1
20 CAPSULE, EXTENDED RELEASE ORAL EVERY MORNING
Qty: 30 CAPSULE | Refills: 0 | Status: SHIPPED | OUTPATIENT
Start: 2020-12-30 | End: 2021-01-06 | Stop reason: SDUPTHER

## 2020-12-30 NOTE — TELEPHONE ENCOUNTER
PATIENT IS CALLING NEEDING A REFILL    amphetamine-dextroamphetamine XR (ADDERALL XR) 20 MG 24 hr capsule    PATIENT HAS 3 PILLS LEFT    PHARMACY:  [facility] #[id number] - [facility] [address] AT [facility] [address] [facility] - [phone] [facility] [phone]       PT#[phone]

## 2021-01-06 NOTE — TELEPHONE ENCOUNTER
Caller: Talisha Zepeda    Relationship: Self    Best call back number: [phone] (H)    Medication needed:   Requested Prescriptions     Pending Prescriptions Disp Refills   • amphetamine-dextroamphetamine XR (ADDERALL XR) 20 MG 24 hr capsule 30 capsule 0     Sig: Take 1 capsule by mouth Every Morning       When do you need the refill by: ASAP    What details did the patient provide when requesting the medication: PATIENT ONLY HAS 2 PILLS LEFT    Does the patient have less than a 3 day supply:  [x] Yes  [] No    What is the patient's preferred pharmacy: [id number] - [place] [address] AT [facility] & [address] - [phone]  - [phone] FX

## 2021-01-07 RX ORDER — DEXTROAMPHETAMINE SACCHARATE, AMPHETAMINE ASPARTATE MONOHYDRATE, DEXTROAMPHETAMINE SULFATE AND AMPHETAMINE SULFATE 5; 5; 5; 5 MG/1; MG/1; MG/1; MG/1
20 CAPSULE, EXTENDED RELEASE ORAL EVERY MORNING
Qty: 30 CAPSULE | Refills: 0 | Status: SHIPPED | OUTPATIENT
Start: 2021-01-07 | End: 2021-01-07 | Stop reason: SDUPTHER

## 2021-01-07 RX ORDER — DEXTROAMPHETAMINE SACCHARATE, AMPHETAMINE ASPARTATE MONOHYDRATE, DEXTROAMPHETAMINE SULFATE AND AMPHETAMINE SULFATE 5; 5; 5; 5 MG/1; MG/1; MG/1; MG/1
20 CAPSULE, EXTENDED RELEASE ORAL EVERY MORNING
Qty: 30 CAPSULE | Refills: 0 | Status: SHIPPED | OUTPATIENT
Start: 2021-01-07 | End: 2021-02-04 | Stop reason: SDUPTHER

## 2021-01-18 ENCOUNTER — OFFICE VISIT (OUTPATIENT)
Dept: FAMILY MEDICINE CLINIC | Facility: CLINIC | Age: 41
End: 2021-01-18

## 2021-01-18 VITALS
BODY MASS INDEX: 22.45 KG/M2 | TEMPERATURE: 97.1 F | SYSTOLIC BLOOD PRESSURE: 110 MMHG | DIASTOLIC BLOOD PRESSURE: 60 MMHG | HEIGHT: 62 IN | WEIGHT: 122 LBS | OXYGEN SATURATION: 98 % | HEART RATE: 91 BPM

## 2021-01-18 DIAGNOSIS — R20.2 NUMBNESS AND TINGLING: Primary | ICD-10-CM

## 2021-01-18 DIAGNOSIS — Z86.32 HISTORY OF DIABETES MELLITUS ARISING IN PREGNANCY: ICD-10-CM

## 2021-01-18 DIAGNOSIS — R20.0 NUMBNESS AND TINGLING: Primary | ICD-10-CM

## 2021-01-18 DIAGNOSIS — R73.9 ELEVATED BLOOD SUGAR: ICD-10-CM

## 2021-01-18 LAB
ALBUMIN SERPL-MCNC: 4.6 G/DL (ref 3.5–5.2)
ALBUMIN/GLOB SERPL: 2.7 G/DL
ALP SERPL-CCNC: 102 U/L (ref 39–117)
ALT SERPL W P-5'-P-CCNC: 13 U/L (ref 1–33)
ANION GAP SERPL CALCULATED.3IONS-SCNC: 8.8 MMOL/L (ref 5–15)
AST SERPL-CCNC: 17 U/L (ref 1–32)
BILIRUB SERPL-MCNC: 0.3 MG/DL (ref 0–1.2)
BUN SERPL-MCNC: 9 MG/DL (ref 6–20)
BUN/CREAT SERPL: 16.7 (ref 7–25)
CALCIUM SPEC-SCNC: 8.7 MG/DL (ref 8.6–10.5)
CHLORIDE SERPL-SCNC: 106 MMOL/L (ref 98–107)
CO2 SERPL-SCNC: 28.2 MMOL/L (ref 22–29)
CREAT SERPL-MCNC: 0.54 MG/DL (ref 0.57–1)
ERYTHROCYTE [DISTWIDTH] IN BLOOD BY AUTOMATED COUNT: 12 % (ref 12.3–15.4)
GFR SERPL CREATININE-BSD FRML MDRD: 126 ML/MIN/1.73
GLOBULIN UR ELPH-MCNC: 1.7 GM/DL
GLUCOSE SERPL-MCNC: 89 MG/DL (ref 65–99)
HBA1C MFR BLD: 4.5 % (ref 4.8–5.6)
HCT VFR BLD AUTO: 39.5 % (ref 34–46.6)
HGB BLD-MCNC: 13.4 G/DL (ref 12–15.9)
LYMPHOCYTES # BLD AUTO: 1.7 10*3/MM3 (ref 0.7–3.1)
LYMPHOCYTES NFR BLD AUTO: 32.4 % (ref 19.6–45.3)
MCH RBC QN AUTO: 32.1 PG (ref 26.6–33)
MCHC RBC AUTO-ENTMCNC: 34 G/DL (ref 31.5–35.7)
MCV RBC AUTO: 94.4 FL (ref 79–97)
MONOCYTES # BLD AUTO: 0.4 10*3/MM3 (ref 0.1–0.9)
MONOCYTES NFR BLD AUTO: 7.3 % (ref 5–12)
NEUTROPHILS NFR BLD AUTO: 3.1 10*3/MM3 (ref 1.7–7)
NEUTROPHILS NFR BLD AUTO: 60.3 % (ref 42.7–76)
PLATELET # BLD AUTO: 243 10*3/MM3 (ref 140–450)
PMV BLD AUTO: 7.2 FL (ref 6–12)
POTASSIUM SERPL-SCNC: 3.4 MMOL/L (ref 3.5–5.2)
PROT SERPL-MCNC: 6.3 G/DL (ref 6–8.5)
RBC # BLD AUTO: 4.18 10*6/MM3 (ref 3.77–5.28)
SODIUM SERPL-SCNC: 143 MMOL/L (ref 136–145)
TSH SERPL DL<=0.05 MIU/L-ACNC: 1.83 UIU/ML (ref 0.27–4.2)
WBC # BLD AUTO: 5.2 10*3/MM3 (ref 3.4–10.8)

## 2021-01-18 PROCEDURE — 80053 COMPREHEN METABOLIC PANEL: CPT | Performed by: FAMILY MEDICINE

## 2021-01-18 PROCEDURE — 84443 ASSAY THYROID STIM HORMONE: CPT | Performed by: FAMILY MEDICINE

## 2021-01-18 PROCEDURE — 99213 OFFICE O/P EST LOW 20 MIN: CPT | Performed by: FAMILY MEDICINE

## 2021-01-18 PROCEDURE — 85025 COMPLETE CBC W/AUTO DIFF WBC: CPT | Performed by: FAMILY MEDICINE

## 2021-01-18 PROCEDURE — 83036 HEMOGLOBIN GLYCOSYLATED A1C: CPT | Performed by: FAMILY MEDICINE

## 2021-01-18 NOTE — PROGRESS NOTES
"SUBJECTIVE:  The patient is a 40-year-old white female.  She presents today because of numbness in her hands and feet.  This has been going on quite some time but is becoming worse.  She did have gestational diabetes.  She has not had recent blood work checked.  She states her feet and hands also feel cold all the time.    PAST MEDICAL HISTORY:  Reviewed.    REVIEW OF SYSTEMS:  Please see above.  All others reviewed and are negative.      OBJECTIVE:   /60 (BP Location: Left arm, Patient Position: Sitting, Cuff Size: Adult)   Pulse 91   Temp 97.1 °F (36.2 °C) (Infrared)   Ht 157.5 cm (62.01\")   Wt 55.3 kg (122 lb)   SpO2 98%   BMI 22.31 kg/m²    Vitals signs are reviewed and are stable.    General:  Well-nourished.  Alert and oriented x3 in no acute distress.  HEENT: PERRLA.   Neck:  Supple.   Lungs:  Clear.    Heart:  Regular rate and rhythm.   Abdomen:   Soft, nontender.   Extremities:  No cyanosis, clubbing or edema..  Pulses present.  Neurological:  Grossly intact without motor or sensory deficits.     ASSESSMENT:      Diagnoses and all orders for this visit:    1. Numbness and tingling (Primary)  -     Hemoglobin A1c  -     Comprehensive Metabolic Panel  -     CBC & Differential  -     TSH  -     EMG & Nerve Conduction Test; Future    2. History of diabetes mellitus arising in pregnancy  -     Hemoglobin A1c  -     Comprehensive Metabolic Panel  -     CBC & Differential  -     TSH  -     EMG & Nerve Conduction Test; Future    3. Elevated blood sugar  -     Hemoglobin A1c  -     Comprehensive Metabolic Panel  -     CBC & Differential  -     TSH         PLAN: See above.  Patient will follow-up on labs.  Follow-up after tests are done.  Call if problems.    Dictated utilizing Dragon dictation.    "

## 2021-01-19 DIAGNOSIS — E87.6 HYPOKALEMIA: Primary | ICD-10-CM

## 2021-02-04 RX ORDER — DEXTROAMPHETAMINE SACCHARATE, AMPHETAMINE ASPARTATE MONOHYDRATE, DEXTROAMPHETAMINE SULFATE AND AMPHETAMINE SULFATE 5; 5; 5; 5 MG/1; MG/1; MG/1; MG/1
20 CAPSULE, EXTENDED RELEASE ORAL EVERY MORNING
Qty: 30 CAPSULE | Refills: 0 | Status: SHIPPED | OUTPATIENT
Start: 2021-02-04 | End: 2021-03-08 | Stop reason: SDUPTHER

## 2021-02-04 NOTE — TELEPHONE ENCOUNTER
Caller: Talisha Zepeda    Relationship: Self    Best call back number: [phone]    Medication needed:   Requested Prescriptions     Pending Prescriptions Disp Refills   • amphetamine-dextroamphetamine XR (ADDERALL XR) 20 MG 24 hr capsule 30 capsule 0     Sig: Take 1 capsule by mouth Every Morning       When do you need the refill by: 02/04 ASAP    What details did the patient provide when requesting the medication: PATIENT ONLY HAS 1 TABLET LEFT AND THATS FOR TODAY.     Does the patient have less than a 3 day supply:  [x] Yes  [] No    What is the patient's preferred pharmacy: [facility] #[id number] [place], [place] - [address] [facility] [address] [facility] [phone] [facility] [phone]

## 2021-02-23 ENCOUNTER — TELEPHONE (OUTPATIENT)
Dept: FAMILY MEDICINE CLINIC | Facility: CLINIC | Age: 41
End: 2021-02-23

## 2021-02-23 NOTE — TELEPHONE ENCOUNTER
PATIENT IS CALLING IN SHE STATES THAT SHE WAS TAKING A DIFFERENT FORM OF HER ADDERALL AND THIS NEW SCRIPT OF THE AMPHETAMINE- DEXTROAMPHETAMINE IS KEEPING HER UP AT NIGHT SHE CAN NOT SLEEP.    SHE STATES SHE CAN NOT RECALL NAME OF THE OTHER MEDICATION AND SHE WOULD LIKE TO GO BACK TO THE OTHER ONE. SHE STATES OTHER ONE WAS A D-AMPHETAMINE SALT COMBO.    PLEASE ADVISE    CALLBACK NUMBER IS  [phone]     SHE STATES THIS NEW ONE IS A LITTLE TOO STRONG.    SHE WANTS TO KNOW WHAT TO DO WITH THE ONES SHE PICKED UP.        CONFIRMED PHARMACY  [facility] #[id number] [facility] [address] AT [facility] [address] [facility] - [phone]  - [phone] FX

## 2021-02-24 NOTE — TELEPHONE ENCOUNTER
Lm informing pt what we sent in was xr and its the same we have been giving. Informed should check with pharmacy if she was given something different

## 2021-03-08 RX ORDER — DEXTROAMPHETAMINE SACCHARATE, AMPHETAMINE ASPARTATE MONOHYDRATE, DEXTROAMPHETAMINE SULFATE AND AMPHETAMINE SULFATE 5; 5; 5; 5 MG/1; MG/1; MG/1; MG/1
20 CAPSULE, EXTENDED RELEASE ORAL EVERY MORNING
Qty: 30 CAPSULE | Refills: 0 | Status: SHIPPED | OUTPATIENT
Start: 2021-03-08 | End: 2021-04-12 | Stop reason: SDUPTHER

## 2021-03-08 NOTE — TELEPHONE ENCOUNTER
Caller: Talisha Zepeda    Relationship: Self    Best call back number: [phone]     Medication needed:   Requested Prescriptions     Pending Prescriptions Disp Refills   • amphetamine-dextroamphetamine XR (ADDERALL XR) 20 MG 24 hr capsule 30 capsule 0     Sig: Take 1 capsule by mouth Every Morning       When do you need the refill by: ASAP    Does the patient have less than a 3 day supply:  [x] Yes  [] No    What is the patient's preferred pharmacy: [facility] #[id number] [facility] [address] AT [facility] [address] [facility] [phone] [facility] [phone]

## 2021-03-26 ENCOUNTER — HOSPITAL ENCOUNTER (OUTPATIENT)
Dept: INFUSION THERAPY | Facility: HOSPITAL | Age: 41
Discharge: HOME | End: 2021-03-26
Admitting: PSYCHIATRY & NEUROLOGY

## 2021-03-26 DIAGNOSIS — R20.0 NUMBNESS AND TINGLING: ICD-10-CM

## 2021-03-26 DIAGNOSIS — R20.2 NUMBNESS AND TINGLING: ICD-10-CM

## 2021-03-26 DIAGNOSIS — Z86.32 HISTORY OF DIABETES MELLITUS ARISING IN PREGNANCY: ICD-10-CM

## 2021-03-26 PROCEDURE — 95913 NRV CNDJ TEST 13/> STUDIES: CPT | Performed by: PSYCHIATRY & NEUROLOGY

## 2021-03-26 PROCEDURE — 95886 MUSC TEST DONE W/N TEST COMP: CPT | Performed by: PSYCHIATRY & NEUROLOGY

## 2021-03-26 PROCEDURE — 95913 NRV CNDJ TEST 13/> STUDIES: CPT

## 2021-03-26 PROCEDURE — 95886 MUSC TEST DONE W/N TEST COMP: CPT

## 2021-03-26 NOTE — PROCEDURES
EMG and Nerve Conduction Studies    I.      Instrument used: Neuromax 1002  II.     Please see data sheets for tabular summary of NCS and details on methods, temperatures and lab standards.   III.    EMG muscles tested for upper extremity studies include the deltoid, biceps, triceps, pronator teres, extensor digitorum communis, first dorsal interosseous and abductor pollicis brevis.    IV.   EMG muscles tested for lower extremity studies include the vastus lateralis, tibialis anterior, peroneus longus, medial gastrocnemius and extensor digitorum brevis.    V.    Additional muscles tested as needed.  Paraspinal muscles tested as needed.   VI.   Please see data sheets for tabular summary of EMG findings.   VII. The complete report includes the data sheets.      Indication: Numbness and tingling of the hands and feet the right side a little worse than left  History: 40-year-old white female with numbness and tingling in all 4 extremities predominantly distally and a bit worse in the right hand and foot than on the left.  History of gestational diabetes with very normal hemoglobin A1c currently.  No history of thyroid disease.  History of pregnancy/postpartum congestive heart failure.  She relates a history of carpal tunnel syndrome when she worked as a .      Ht: 157.5 cm  Wt: 55.3 kg  HbA1C:   Lab Results   Component Value Date    HGBA1C 4.50 (L) 01/18/2021     TSH:   Lab Results   Component Value Date    TSH 1.830 01/18/2021       Technical summary:  Nerve conduction studies were obtained in both arms and in the right leg.  The hands were cold but temperature correction was not needed since the distal latencies were normal.  The right foot was warmed prior to study with temperature generally at least 32 °C.  The temperature dipped below this at times but temperature correction was not needed since the distal latencies were normal.  Needle examination of selected muscles in all 4 extremities was  obtained.    Results:  1.  Normal median antidromic sensory studies bilaterally.  2.  Normal median orthodromic palmar sensory studies bilaterally.  (The palmar method was subsequently used since carpal tunnel syndrome was on the list of diagnoses and the antidromic studies were normal)  3.  Normal right radial sensory study.  4.  Normal ulnar sensory studies bilaterally.  5.  Normal median motor studies bilaterally.  6.  Normal ulnar motor studies bilaterally.  7.  Normal right sural sensory study.  8.  Normal right superficial peroneal sensory study.  9.  Normal right peroneal motor study.  10.  Normal right tibial motor study.  11.  Needle examination of selected muscles in all 4 extremities showed normal insertional activities throughout.  There were normal motor units and recruitment patterns throughout.    Impression:  Normal study.  No evidence of a median or ulnar neuropathy on either side, right peroneal or tibial neuropathy, peripheral polyneuropathy or a cervical or lumbosacral radiculopathy on either side by the study.  Study results were discussed with the patient.    Reza Alvarez M.D.              Dictated utilizing Dragon dictation.

## 2021-03-29 ENCOUNTER — IMMUNIZATION (OUTPATIENT)
Dept: VACCINE CLINIC | Facility: HOSPITAL | Age: 41
End: 2021-03-29

## 2021-03-29 PROCEDURE — 91300 HC SARSCOV02 VAC 30MCG/0.3ML IM: CPT | Performed by: OBSTETRICS & GYNECOLOGY

## 2021-03-29 PROCEDURE — 0001A: CPT | Performed by: OBSTETRICS & GYNECOLOGY

## 2021-04-12 NOTE — TELEPHONE ENCOUNTER
Caller: Talisha Zepeda    Relationship: Self    Best call back number: [phone]    Medication needed:   Requested Prescriptions     Pending Prescriptions Disp Refills   • amphetamine-dextroamphetamine XR (ADDERALL XR) 20 MG 24 hr capsule 30 capsule 0     Sig: Take 1 capsule by mouth Every Morning       When do you need the refill by: 04/12/21    What additional details did the patient provide when requesting the medication: PATIENT COMPLETELY OUT OF MEDICATION    Does the patient have less than a 3 day supply:  [x] Yes  [] No    What is the patient's preferred pharmacy: [facility] #[id number] [place], [place] - [address] [facility] [address] [facility] [phone] [facility] [phone]

## 2021-04-13 ENCOUNTER — LAB (OUTPATIENT)
Dept: FAMILY MEDICINE CLINIC | Facility: CLINIC | Age: 41
End: 2021-04-13

## 2021-04-13 DIAGNOSIS — Z51.81 MEDICATION MONITORING ENCOUNTER: ICD-10-CM

## 2021-04-13 DIAGNOSIS — Z51.81 MEDICATION MONITORING ENCOUNTER: Primary | ICD-10-CM

## 2021-04-13 LAB
ANION GAP SERPL CALCULATED.3IONS-SCNC: 9.8 MMOL/L (ref 5–15)
BUN SERPL-MCNC: 12 MG/DL (ref 6–20)
BUN/CREAT SERPL: 20.3 (ref 7–25)
CALCIUM SPEC-SCNC: 8.6 MG/DL (ref 8.6–10.5)
CHLORIDE SERPL-SCNC: 102 MMOL/L (ref 98–107)
CO2 SERPL-SCNC: 27.2 MMOL/L (ref 22–29)
CREAT SERPL-MCNC: 0.59 MG/DL (ref 0.57–1)
GFR SERPL CREATININE-BSD FRML MDRD: 113 ML/MIN/1.73
GLUCOSE SERPL-MCNC: 80 MG/DL (ref 65–99)
POTASSIUM SERPL-SCNC: 4 MMOL/L (ref 3.5–5.2)
SODIUM SERPL-SCNC: 139 MMOL/L (ref 136–145)

## 2021-04-13 PROCEDURE — 36415 COLL VENOUS BLD VENIPUNCTURE: CPT | Performed by: FAMILY MEDICINE

## 2021-04-13 PROCEDURE — 80048 BASIC METABOLIC PNL TOTAL CA: CPT | Performed by: FAMILY MEDICINE

## 2021-04-13 RX ORDER — DEXTROAMPHETAMINE SACCHARATE, AMPHETAMINE ASPARTATE MONOHYDRATE, DEXTROAMPHETAMINE SULFATE AND AMPHETAMINE SULFATE 5; 5; 5; 5 MG/1; MG/1; MG/1; MG/1
20 CAPSULE, EXTENDED RELEASE ORAL EVERY MORNING
Qty: 30 CAPSULE | Refills: 0 | Status: SHIPPED | OUTPATIENT
Start: 2021-04-13 | End: 2021-05-12 | Stop reason: SDUPTHER

## 2021-04-16 LAB — DRUGS UR: NORMAL

## 2021-04-19 ENCOUNTER — IMMUNIZATION (OUTPATIENT)
Dept: VACCINE CLINIC | Facility: HOSPITAL | Age: 41
End: 2021-04-19

## 2021-04-19 PROCEDURE — 0002A: CPT | Performed by: OBSTETRICS & GYNECOLOGY

## 2021-04-19 PROCEDURE — 91300 HC SARSCOV02 VAC 30MCG/0.3ML IM: CPT | Performed by: OBSTETRICS & GYNECOLOGY

## 2021-05-12 ENCOUNTER — TELEPHONE (OUTPATIENT)
Dept: FAMILY MEDICINE CLINIC | Facility: CLINIC | Age: 41
End: 2021-05-12

## 2021-05-12 RX ORDER — DEXTROAMPHETAMINE SACCHARATE, AMPHETAMINE ASPARTATE MONOHYDRATE, DEXTROAMPHETAMINE SULFATE AND AMPHETAMINE SULFATE 5; 5; 5; 5 MG/1; MG/1; MG/1; MG/1
20 CAPSULE, EXTENDED RELEASE ORAL EVERY MORNING
Qty: 30 CAPSULE | Refills: 0 | Status: SHIPPED | OUTPATIENT
Start: 2021-05-12 | End: 2021-06-11 | Stop reason: SDUPTHER

## 2021-05-12 NOTE — TELEPHONE ENCOUNTER
Caller: Talisha Zepeda    Relationship: Self    Best call back number: [phone]    Medication needed:   Requested Prescriptions     Pending Prescriptions Disp Refills   • amphetamine-dextroamphetamine XR (ADDERALL XR) 20 MG 24 hr capsule 30 capsule 0     Sig: Take 1 capsule by mouth Every Morning       When do you need the refill by: ASAP    What additional details did the patient provide when requesting the medication: HAS 2 PILLS LEFT    Does the patient have less than a 3 day supply:  [x] Yes  [] No    What is the patient's preferred pharmacy: [facility] #[id number] [facility] [address] [facility] [address] [facility] [phone] [facility] [phone]

## 2021-05-15 ENCOUNTER — HOSPITAL ENCOUNTER (EMERGENCY)
Facility: HOSPITAL | Age: 41
Discharge: LEFT BEFORE DISCHARGE | End: 2021-05-15

## 2021-05-15 VITALS
RESPIRATION RATE: 16 BRPM | TEMPERATURE: 97.7 F | BODY MASS INDEX: 22.31 KG/M2 | HEART RATE: 109 BPM | OXYGEN SATURATION: 99 % | HEIGHT: 62 IN

## 2021-05-15 PROCEDURE — 99211 OFF/OP EST MAY X REQ PHY/QHP: CPT

## 2021-05-16 NOTE — ED NOTES
Pt given mask upon arrival. RN wearing mask and eye protection during pt interactions.    Pt reports that she has a contact lens in her right eye and that she can't remove. Pt is in NAD at this time.     Mica Alvarez RN  05/15/21 [id number]

## 2021-05-24 ENCOUNTER — TELEPHONE (OUTPATIENT)
Dept: FAMILY MEDICINE CLINIC | Facility: CLINIC | Age: 41
End: 2021-05-24

## 2021-05-24 RX ORDER — PANTOPRAZOLE SODIUM 20 MG/1
20 TABLET, DELAYED RELEASE ORAL 2 TIMES DAILY
Qty: 60 TABLET | Refills: 0 | Status: SHIPPED | OUTPATIENT
Start: 2021-05-24 | End: 2021-06-24

## 2021-05-24 RX ORDER — PANTOPRAZOLE SODIUM 20 MG/1
20 TABLET, DELAYED RELEASE ORAL 2 TIMES DAILY
Qty: 60 TABLET | Refills: 0 | Status: SHIPPED | OUTPATIENT
Start: 2021-05-24 | End: 2021-05-24

## 2021-05-24 NOTE — TELEPHONE ENCOUNTER
Caller: Talisha Zepeda    Relationship: Self    Best call back number: [phone]     Medication needed:   Requested Prescriptions     Pending Prescriptions Disp Refills   • pantoprazole (PROTONIX) 20 MG EC tablet 60 tablet 0     Sig: Take 1 tablet by mouth 2 (Two) Times a Day.       When do you need the refill by: ASAP    What additional details did the patient provide when requesting the medication: IS TOTALLY OUT    Does the patient have less than a 3 day supply:  [x] Yes  [] No    What is the patient's preferred pharmacy: [facility] #[id number] [facility] [address] [facility] [address] & [facility] [phone] [facility] [phone]

## 2021-06-11 NOTE — TELEPHONE ENCOUNTER
Caller: Talisha Zepeda    Relationship: Self    Best call back number: [phone] (H)    Medication needed:   Requested Prescriptions     Pending Prescriptions Disp Refills   • amphetamine-dextroamphetamine XR (ADDERALL XR) 20 MG 24 hr capsule 30 capsule 0     Sig: Take 1 capsule by mouth Every Morning       When do you need the refill by: ASAP    What additional details did the patient provide when requesting the medication: PATIENT ONLY HAS 2 PILLS LEFTS AND NEEDS ASAP    Does the patient have less than a 3 day supply:  [x] Yes  [] No    What is the patient's preferred pharmacy: [facility] #[id number] [place], [place] - [address] [facility] [address] [facility] [phone] [facility] [phone]

## 2021-06-12 RX ORDER — DEXTROAMPHETAMINE SACCHARATE, AMPHETAMINE ASPARTATE MONOHYDRATE, DEXTROAMPHETAMINE SULFATE AND AMPHETAMINE SULFATE 5; 5; 5; 5 MG/1; MG/1; MG/1; MG/1
20 CAPSULE, EXTENDED RELEASE ORAL EVERY MORNING
Qty: 30 CAPSULE | Refills: 0 | Status: SHIPPED | OUTPATIENT
Start: 2021-06-12 | End: 2021-07-13 | Stop reason: SDUPTHER

## 2021-06-24 RX ORDER — PANTOPRAZOLE SODIUM 20 MG/1
TABLET, DELAYED RELEASE ORAL
Qty: 60 TABLET | Refills: 0 | Status: ON HOLD | OUTPATIENT
Start: 2021-06-24 | End: 2021-11-04

## 2021-06-29 ENCOUNTER — OFFICE VISIT (OUTPATIENT)
Dept: FAMILY MEDICINE CLINIC | Facility: CLINIC | Age: 41
End: 2021-06-29

## 2021-06-29 VITALS
RESPIRATION RATE: 18 BRPM | OXYGEN SATURATION: 97 % | TEMPERATURE: 97.8 F | SYSTOLIC BLOOD PRESSURE: 140 MMHG | HEART RATE: 94 BPM | WEIGHT: 126 LBS | DIASTOLIC BLOOD PRESSURE: 78 MMHG | BODY MASS INDEX: 23.19 KG/M2 | HEIGHT: 62 IN

## 2021-06-29 DIAGNOSIS — N94.6 PAINFUL MENSTRUAL PERIODS: ICD-10-CM

## 2021-06-29 DIAGNOSIS — L60.0 INGROWN TOENAIL OF BOTH FEET: Primary | ICD-10-CM

## 2021-06-29 PROCEDURE — 99213 OFFICE O/P EST LOW 20 MIN: CPT | Performed by: NURSE PRACTITIONER

## 2021-06-29 NOTE — PATIENT INSTRUCTIONS
Refer eval podiatry and gave patient phone number to FU with GYN Dr Tabares to discuss possible hysterectomy, gave pt handout education about ingrown toenails  Ingrown Toenail  An ingrown toenail occurs when the corner or sides of a toenail grow into the surrounding skin. This causes discomfort and pain. The big toe is most commonly affected, but any of the toes can be affected. If an ingrown toenail is not treated, it can become infected.  What are the causes?  This condition may be caused by:  · Wearing shoes that are too small or tight.  · An injury, such as stubbing your toe or having your toe stepped on.  · Improper cutting or care of your toenails.  · Having nail or foot abnormalities that were present from birth (congenital abnormalities), such as having a nail that is too big for your toe.  What increases the risk?  The following factors may make you more likely to develop ingrown toenails:  · Age. Nails tend to get thicker with age, so ingrown nails are more common among older people.  · Cutting your toenails incorrectly, such as cutting them very short or cutting them unevenly.  An ingrown toenail is more likely to get infected if you have:  · Diabetes.  · Blood flow (circulation) problems.  What are the signs or symptoms?  Symptoms of an ingrown toenail may include:  · Pain, soreness, or tenderness.  · Redness.  · Swelling.  · Hardening of the skin that surrounds the toenail.  Signs that an ingrown toenail may be infected include:  · Fluid or pus.  · Symptoms that get worse instead of better.  How is this diagnosed?  An ingrown toenail may be diagnosed based on your medical history, your symptoms, and a physical exam. If you have fluid or blood coming from your toenail, a sample may be collected to test for the specific type of bacteria that is causing the infection.  How is this treated?  Treatment depends on how severe your ingrown toenail is. You may be able to care for your toenail at home.  · If you  have an infection, you may be prescribed antibiotic medicines.  · If you have fluid or pus draining from your toenail, your health care provider may drain it.  · If you have trouble walking, you may be given crutches to use.  · If you have a severe or infected ingrown toenail, you may need a procedure to remove part or all of the nail.  Follow these instructions at home:  Foot care    · Do not pick at your toenail or try to remove it yourself.  · Soak your foot in warm, soapy water. Do this for 20 minutes, 3 times a day, or as often as told by your health care provider. This helps to keep your toe clean and keep your skin soft.  · Wear shoes that fit well and are not too tight. Your health care provider may recommend that you wear open-toed shoes while you heal.  · Trim your toenails regularly and carefully. Cut your toenails straight across to prevent injury to the skin at the corners of the toenail. Do not cut your nails in a curved shape.  · Keep your feet clean and dry to help prevent infection.  Medicines  · Take over-the-counter and prescription medicines only as told by your health care provider.  · If you were prescribed an antibiotic, take it as told by your health care provider. Do not stop taking the antibiotic even if you start to feel better.  Activity  · Return to your normal activities as told by your health care provider. Ask your health care provider what activities are safe for you.  · Avoid activities that cause pain.  General instructions  · If your health care provider told you to use crutches to help you move around, use them as instructed.  · Keep all follow-up visits as told by your health care provider. This is important.  Contact a health care provider if:  · You have more redness, swelling, pain, or other symptoms that do not improve with treatment.  · You have fluid, blood, or pus coming from your toenail.  Get help right away if:  · You have a red streak on your skin that starts at your  foot and spreads up your leg.  · You have a fever.  Summary  · An ingrown toenail occurs when the corner or sides of a toenail grow into the surrounding skin. This causes discomfort and pain. The big toe is most commonly affected, but any of the toes can be affected.  · If an ingrown toenail is not treated, it can become infected.  · Fluid or pus draining from your toenail is a sign of infection. Your health care provider may need to drain it. You may be given antibiotics to treat the infection.  · Trimming your toenails regularly and properly can help you prevent an ingrown toenail.  This information is not intended to replace advice given to you by your health care provider. Make sure you discuss any questions you have with your health care provider.  Document Revised: 04/10/2020 Document Reviewed: 09/05/2018  [employer] Patient Education © 2021 [employer]

## 2021-06-29 NOTE — PROGRESS NOTES
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, pain with direct contact, no infection or redness, states has been trying to get toenail out but unsuccessful, states trained as  and no infection, dc or redness, states has been monitoring > 1 year, no prev podiatry consult request referral  Last pap 11/18 Dr Tabares, s/p ablation 01/19 but still having painful cramping menses irregular and now wants hysterectomy but states menses no longer heavy, no vag dc, odor, itch or pain, no dysuria, freq or urgency, no new sexual partners     Objective   Vital Signs:   /78 (BP Location: Left arm, Patient Position: Sitting)   Pulse 94   Temp 97.8 °F (36.6 °C) (Infrared)   Resp 18   Ht 157.5 cm (62.01\")   Wt 57.2 kg (126 lb)   SpO2 97%   BMI 23.04 kg/m²     Physical Exam  Vitals reviewed.   Constitutional:       Appearance: She is well-developed.   HENT:      Head: Normocephalic and atraumatic.   Eyes:      Conjunctiva/sclera: Conjunctivae normal.      Pupils: Pupils are equal, round, and reactive to light.   Cardiovascular:      Rate and Rhythm: Normal rate and regular rhythm.      Pulses: Normal pulses.      Heart sounds: Normal heart sounds.   Pulmonary:      Effort: Pulmonary effort is normal.      Breath sounds: Normal breath sounds.   Abdominal:      General: There is no distension.      Palpations: There is no mass.      Tenderness: There is abdominal tenderness in the suprapubic area. There is no right CVA tenderness, left CVA tenderness, guarding or rebound.      Hernia: No hernia is present.   Musculoskeletal:         General: Tenderness (B ingrown toenails 1st toe without erythema or dc, but point tenderness) " present. Normal range of motion.      Cervical back: Normal range of motion.      Right lower leg: No edema.      Left lower leg: No edema.   Skin:     General: Skin is warm and dry.   Neurological:      Mental Status: She is alert and oriented to person, place, and time.   Psychiatric:         Mood and Affect: Mood normal.         Behavior: Behavior normal.         Thought Content: Thought content normal.         Judgment: Judgment normal.        Result Review :                 Assessment and Plan    Diagnoses and all orders for this visit:    1. Ingrown toenail of both feet (Primary)  -     Ambulatory Referral to Podiatry    2. Painful menstrual periods        Follow Up   No follow-ups on file.  Patient was given instructions and counseling regarding her condition or for health maintenance advice. Please see specific information pulled into the AVS if appropriate.   Refer eval podiatry and gave patient phone number to FU with GYN Dr Tabares to discuss possible hysterectomy, gave pt handout education about ingrown toenails

## 2021-07-13 NOTE — TELEPHONE ENCOUNTER
Caller: Talisha Zepeda    Relationship: Self    Best call back number:     Medication needed:   Requested Prescriptions     Pending Prescriptions Disp Refills   • amphetamine-dextroamphetamine XR (ADDERALL XR) 20 MG 24 hr capsule 30 capsule 0     Sig: Take 1 capsule by mouth Every Morning   PT STATES ADDERALL IS REALLY STRONG, SHE CAN BARELY SLEEP. SHE REQUESTS TO POSSIBLY GO BACK TO THE ONE SHE WAS ON.         Does the patient have less than a 3 day supply:  [] Yes  [] No    What is the patient's preferred pharmacy: [facility] #[id number] [facility] [address] [facility] [address] [facility] [phone] [facility] [phone]

## 2021-07-14 RX ORDER — DEXTROAMPHETAMINE SACCHARATE, AMPHETAMINE ASPARTATE MONOHYDRATE, DEXTROAMPHETAMINE SULFATE AND AMPHETAMINE SULFATE 5; 5; 5; 5 MG/1; MG/1; MG/1; MG/1
20 CAPSULE, EXTENDED RELEASE ORAL EVERY MORNING
Qty: 30 CAPSULE | Refills: 0 | Status: SHIPPED | OUTPATIENT
Start: 2021-07-14 | End: 2021-08-12 | Stop reason: SDUPTHER

## 2021-08-12 RX ORDER — DEXTROAMPHETAMINE SACCHARATE, AMPHETAMINE ASPARTATE MONOHYDRATE, DEXTROAMPHETAMINE SULFATE AND AMPHETAMINE SULFATE 5; 5; 5; 5 MG/1; MG/1; MG/1; MG/1
20 CAPSULE, EXTENDED RELEASE ORAL EVERY MORNING
Qty: 30 CAPSULE | Refills: 0 | Status: SHIPPED | OUTPATIENT
Start: 2021-08-12 | End: 2021-09-14 | Stop reason: SDUPTHER

## 2021-08-12 NOTE — TELEPHONE ENCOUNTER
Caller: Talisha Zepeda    Relationship: Self    Best call back number: [phone] (H)    Medication needed:   Requested Prescriptions     Pending Prescriptions Disp Refills   • amphetamine-dextroamphetamine XR (ADDERALL XR) 20 MG 24 hr capsule 30 capsule 0     Sig: Take 1 capsule by mouth Every Morning       When do you need the refill by: 08/12/21    What additional details did the patient provide when requesting the medication: PATIENT STATES HAS 2 LEFT    PATIENT IS ALSO INQUIRING ABOUT A REFERRAL TO PODIATRY PATIENT STATES HAS NOT RECEIVED CALL ABOUT APPOINTMENT    Does the patient have less than a 3 day supply:  [x] Yes  [] No    What is the patient's preferred pharmacy: [facility] #[id number] [place], [place] - [address] [facility] [address] [facility] [phone] [facility] [phone]

## 2021-09-14 NOTE — TELEPHONE ENCOUNTER
PATIENT CALLED STATING THAT THE NEW PRESCRIPTION FOR ADDERALL WILL NEED AUTHORIZATION, THE NUMBER TO CALL [phone]

## 2021-09-14 NOTE — TELEPHONE ENCOUNTER
PATIENT CALLED FOR MEDICATION REFILL OF     amphetamine-dextroamphetamine XR (ADDERALL XR) 20 MG 24 hr capsule    SHE WILL BE OUT TODAY    [facility] #[id number] - [place], [place] - [address] AT [facility] [address] [facility] - [phone]  - [phone]   [phone]    CALL BACK NUMBER [phone]

## 2021-09-15 RX ORDER — DEXTROAMPHETAMINE SACCHARATE, AMPHETAMINE ASPARTATE MONOHYDRATE, DEXTROAMPHETAMINE SULFATE AND AMPHETAMINE SULFATE 5; 5; 5; 5 MG/1; MG/1; MG/1; MG/1
20 CAPSULE, EXTENDED RELEASE ORAL EVERY MORNING
Qty: 30 CAPSULE | Refills: 0 | Status: SHIPPED | OUTPATIENT
Start: 2021-09-15 | End: 2021-10-14 | Stop reason: SDUPTHER

## 2021-09-28 ENCOUNTER — OFFICE VISIT (OUTPATIENT)
Dept: OBSTETRICS AND GYNECOLOGY | Facility: CLINIC | Age: 41
End: 2021-09-28

## 2021-09-28 VITALS
HEIGHT: 62 IN | SYSTOLIC BLOOD PRESSURE: 128 MMHG | BODY MASS INDEX: 23.59 KG/M2 | WEIGHT: 128.2 LBS | DIASTOLIC BLOOD PRESSURE: 80 MMHG

## 2021-09-28 DIAGNOSIS — Z98.891 HISTORY OF 3 CESAREAN SECTIONS: Primary | ICD-10-CM

## 2021-09-28 DIAGNOSIS — N92.1 MENORRHAGIA WITH IRREGULAR CYCLE: ICD-10-CM

## 2021-09-28 DIAGNOSIS — R10.2 PELVIC PAIN IN FEMALE: ICD-10-CM

## 2021-09-28 DIAGNOSIS — Z13.9 SCREENING FOR CONDITION: ICD-10-CM

## 2021-09-28 DIAGNOSIS — Z98.890 HISTORY OF ENDOMETRIAL ABLATION: ICD-10-CM

## 2021-09-28 PROBLEM — Z97.5 IUD CONTRACEPTION: Status: RESOLVED | Noted: 2018-11-14 | Resolved: 2021-09-28

## 2021-09-28 PROCEDURE — 99214 OFFICE O/P EST MOD 30 MIN: CPT | Performed by: OBSTETRICS & GYNECOLOGY

## 2021-09-28 NOTE — PROGRESS NOTES
EVALUATION AND MANAGEMENT ENCOUNTER    Talisha Zepeda  Patient new to examiner? Yes  New problem to examiner? Yes  Patient referred? No    -----------------------------------------------------HISTORY---------------------------------------------------    Chief Complaint:   Chief Complaint   Patient presents with   • Menstrual Problem     discuss abation       HPI:  Talisha Zepeda is a 41 y.o. No obstetric history on file. with No LMP recorded. (Menstrual status: Tubal ligation). here to discuss treatment for her continued bleeding after her ablation almost 3 yrs ago.      Pt has painful cramps with her bleeding.  Pt has had her tubes removed and an ablation and wants a hysterectomy.    Menorrhagia  This is a recurrent problem. The current episode started more than 1 month ago. The problem occurs intermittently. The problem has been gradually worsening. Associated symptoms include fatigue. Pertinent negatives include no abdominal pain or urinary symptoms. The symptoms are aggravated by exertion. Treatments tried: prior ablation.        Talisha Zepeda  reports that she quit smoking about 6 years ago. Her smoking use included cigarettes. She quit after 15.00 years of use. She has never used smokeless tobacco..            ROS:  Review of Systems   Constitutional: Positive for fatigue.   HENT: Negative.    Eyes: Negative.    Respiratory: Negative.    Cardiovascular: Negative.    Gastrointestinal: Negative.  Negative for abdominal pain.   Endocrine: Negative.    Genitourinary: Positive for menorrhagia, menstrual problem and pelvic pain.   Musculoskeletal: Negative.    Skin: Negative.    Allergic/Immunologic: Negative.    Neurological: Negative.    Hematological: Negative.    Psychiatric/Behavioral: Negative.    :    Patient reports that she is not currently experiencing any symptoms of urinary incontinence.      noTESTED FOR CHLAMYDIA?  -----------------------------------------------PHYSICAL  "EXAM----------------------------------------------    Vital Signs: /80   Ht 157.5 cm (62\")   Wt 58.2 kg (128 lb 3.2 oz)   Breastfeeding No   BMI 23.45 kg/m²    Flowsheet Rows      First Filed Value   Admission Height  157.5 cm (62\") Documented at 09/28/2021 1307   Admission Weight  58.2 kg (128 lb 3.2 oz) Documented at 09/28/2021 1307          Physical Exam  Vitals and nursing note reviewed.   Constitutional:       Appearance: She is well-developed.   HENT:      Head: Normocephalic and atraumatic.   Cardiovascular:      Rate and Rhythm: Normal rate.   Pulmonary:      Effort: Pulmonary effort is normal.   Abdominal:      General: There is no distension.      Palpations: Abdomen is soft. There is no mass.      Tenderness: There is no abdominal tenderness. There is no guarding.   Genitourinary:     Vagina: No vaginal discharge.   Musculoskeletal:         General: No tenderness or deformity. Normal range of motion.      Cervical back: Normal range of motion.   Skin:     General: Skin is warm and dry.      Coloration: Skin is not pale.      Findings: No erythema or rash.   Neurological:      Mental Status: She is alert and oriented to person, place, and time.   Psychiatric:         Behavior: Behavior normal.         Thought Content: Thought content normal.         Judgment: Judgment normal.         I saw the patient with a face mask, gloves and eye protection  The patient herself was masked.  Social distancing was observed as appropriate. All COVID precautions observed.     -----------------------------------------------MEDICAL DECISION MAKING-----------------------------        DATA Review & labs ordered:     1.   Lab Results (last 24 hours)     ** No results found for the last 24 hours. **        2.   Imaging Results (Last 24 Hours)     ** No results found for the last 24 hours. **        3.   ECG/EMG Results (most recent)     None              Diagnoses and all orders for this visit:    1. History of 3 "  sections: pt denies any classical sections (Primary)    2. Menorrhagia with irregular cycle  -     Case Request; Standing  -     CBC and Differential; Future  -     Type and screen; Future  -     ceFAZolin (ANCEF) 2 g in sodium chloride 0.9 % 100 mL IVPB  -     Case Request    3. Pelvic pain in female    4. History of endometrial ablation  -     Case Request; Standing  -     CBC and Differential; Future  -     Type and screen; Future  -     ceFAZolin (ANCEF) 2 g in sodium chloride 0.9 % 100 mL IVPB  -     Case Request    5. Screening for condition  -     Mammo Screening Digital Tomosynthesis Bilateral With CAD; Future    6. hx Peripartum cardiomyopathy    Other orders  -     Follow Anesthesia Guidelines / Standing Orders; Future  -     Chlorhexidine Skin Prep; Future  -     Follow Anesthesia Guidelines / Standing Orders; Standing  -     Obtain informed consent; Standing  -     Place sequential compression device; Standing  -     Verify / Perform Chlorhexidine Skin Prep; Standing        IMPRESSION/PROBLEM:      Failed ablation with menorrhagia, intolerance to OCs, implantable devices and other LARCs, hx carmen tubal.    (Established problem/s? No, worsening? Yes)    (New Problem/s? Yes, additional workup planned? Yes)      PLAN:     1. Annual, pap, mammogram  2. Total laparoscopic hysterectomy, possible total abdominal hysterectomy.   RISKS, ALTERNATIVES, COMPLICATIONS OF THE PROCEDURE INCLUDING BUT NOT LIMITED TO:    INTRAOPERATIVE RISKS: INJURY TO INTERNAL AND ADJACENT ORGANS AND STRUCTURES (BOWEL, BLADDER, URETER,BLOOD VESSELS) OR HEMORRHAGE REQUIRING FURTHER SURGERY (LAPAROTOMY),  POSSIBLE NON-DIAGNOSTIC FINDINGS, DISCOVERY OF POSSIBLE MALIGNANCY, INFECTION, AND DEATH;   POSTOP COMPLICATIONS: BLEEDING, INFECTION (REQUIRING POSSIBLE REOPERATION), FAILURE OF GOAL OF SURGERY AND RECURRENCE OF ORIGINAL SYMPTOMS, PNEUMONIA, PULMONARY EMBOLISM, AND DEATH;  WERE EXPLAINED TO THE PT WHO VERBALIZED HER  UNDERSTANDING.            Pt to call for any results from testing promptly if she does not hear from us.     RTO No follow-ups on file..  Instructions and precautions given.         Elvis Andre MD  13:44 EDT  09/28/21

## 2021-09-29 ENCOUNTER — OFFICE VISIT (OUTPATIENT)
Dept: CARDIOLOGY | Facility: CLINIC | Age: 41
End: 2021-09-29

## 2021-09-29 VITALS
DIASTOLIC BLOOD PRESSURE: 90 MMHG | OXYGEN SATURATION: 99 % | RESPIRATION RATE: 16 BRPM | BODY MASS INDEX: 24.29 KG/M2 | HEIGHT: 62 IN | SYSTOLIC BLOOD PRESSURE: 130 MMHG | HEART RATE: 83 BPM | WEIGHT: 132 LBS

## 2021-09-29 DIAGNOSIS — Z01.810 PREOP CARDIOVASCULAR EXAM: Primary | ICD-10-CM

## 2021-09-29 PROCEDURE — 99213 OFFICE O/P EST LOW 20 MIN: CPT | Performed by: INTERNAL MEDICINE

## 2021-09-29 PROCEDURE — 93000 ELECTROCARDIOGRAM COMPLETE: CPT | Performed by: INTERNAL MEDICINE

## 2021-09-29 NOTE — PROGRESS NOTES
Subjective:     Encounter Date:01/22/2019      Patient ID: Talisha Zepeda is a 41 y.o. female.    Chief Complaint: SOB, CP, preop cards  History of Present Illness    Dear Dr. Tabares,    I had the pleasure seeing this patient in the office today.  For assessment of cardiac risk prior to hysterectomy scheduled on October 10.  She has a past history of peripartum cardiomyopathy.    She denies any cardiac symptoms at all.  She denies any chest pain, pressure, tightness, squeezing, or heartburn.  She has not experienced any feeling of palpitations, tachycardia or heart racing and no presyncope or syncope.  There have not been any problems with dizziness or lightheadedness.  There have not been any orthopnea or PND, and no problems with lower extremity edema.  She denies any shortness of breath at rest or with activity and has not had any wheezing.  She has not had any problems with unexplained nausea or vomiting. She has continued to perform daily activities of living without any specific problem or change in the level of activity.  She has not been recently hospitalized for any reason.    She has a history of peripartum cardiomyopathy.  She is typically followed with Dr. Ortiz of [facility], although she apparently last saw him in September 2017. I saw her once in 2019. She has a history of peripartum cardiomyopathy and her initial ejection fraction in 2015 was 25%.  She was seen by Dr. Ortiz in August 2017; at that time she was feeling fine.  She had a transthoracic echocardiogram performed at [facility] on September 5, 2017.  It showed an ejection fraction 45-50%.  Left ventricular size was normal.  No significant valvular abnormality was seen.  She previously been on guideline directed medical therapy and had been on carvedilol, afterload reduction, spironolactone, and digoxin.  She states that all this was discontinued after the echocardiogram 2017.  She did have a echocardiogram ordered in 2019  but did not proceed with that.    It appears from the records that she never had an ischemic evaluation.  She states that she never had a stress test or cardiac catheterization.  She denies any lower extremity edema.  She is not any chills or fevers.  No cough.    The following portions of the patient's history were reviewed and updated as appropriate: allergies, current medications, past family history, past medical history, past social history, past surgical history and problem list.    Past Medical History:   Diagnosis Date   • ADHD    • Anxiety    • Asthma    • Chest pain, midsternal     states w/anxiety & activity, relieved w/tums, not sure if reflux or d/t anxiety   • Depression    • History of acquired CHF (congestive heart failure)     with/after pregnancy   • History of gestational hypertension    • History of transfusion     after delivery   • Hx gestational diabetes    • Low back pain     right, h/o slipped disc   • Personal history of cardiomyopathy 2015    w/pregnancy, states treated by Dr Ortiz at [facility]       Past Surgical History:   Procedure Laterality Date   •  SECTION      x3   • CHIN IMPLANT INSERTION      w/jaw surgery   • D & C HYSTEROSCOPY ENDOMETRIAL ABLATION N/A 2019    Procedure: INTRAUTERINE DEVICE REMOVAL  DIAGNOSTIC HYSTEROSCOPY, DILATATION AND CURETTAGE, NOVASURE ENDOMETRIAL ABLATION.;  Surgeon: Elvis Andre MD;  Location: [facility];  Service: Obstetrics/Gynecology   • TUBAL ABDOMINAL LIGATION         Social History     Socioeconomic History   • Marital status:      Spouse name: Not on file   • Number of children: Not on file   • Years of education: Not on file   • Highest education level: Not on file   Tobacco Use   • Smoking status: Former Smoker     Years: 15.00     Types: Cigarettes     Quit date:      Years since quittin.7   • Smokeless tobacco: Never Used   • Tobacco comment: states not sure how much she smoked   Substance  "and Sexual Activity   • Alcohol use: No     Comment: caff use   • Drug use: No   • Sexual activity: Yes     Partners: Male     Birth control/protection: None, Surgical           ECG 12 Lead    Date/Time: 9/29/2021 3:01 PM  Performed by: Tolu Apodaca III, MD  Authorized by: Tolu Apodaca III, MD   Comparison: compared with previous ECG   Similar to previous ECG  Rhythm: sinus rhythm  Rate: normal  Conduction: conduction normal  QRS axis: normal  Other findings: non-specific ST-T wave changes    Clinical impression: non-specific ECG               Objective:     Vitals:    09/29/21 1449   BP: 130/90   Pulse: 83   Resp: 16   SpO2: 99%   Weight: 59.9 kg (132 lb)   Height: 157.5 cm (62\")         Physical Exam  Constitutional:       General: She is not in acute distress.     Appearance: She is well-developed. She is not diaphoretic.   HENT:      Head: Normocephalic and atraumatic.      Nose: Nose normal.   Eyes:      General:         Right eye: No discharge.         Left eye: No discharge.      Conjunctiva/sclera: Conjunctivae normal.      Pupils: Pupils are equal, round, and reactive to light.   Neck:      Thyroid: No thyromegaly.      Trachea: No tracheal deviation.   Cardiovascular:      Rate and Rhythm: Normal rate and regular rhythm.      Pulses: Normal pulses.      Heart sounds: Normal heart sounds, S1 normal and S2 normal. No S3 sounds.    Pulmonary:      Effort: Pulmonary effort is normal. No respiratory distress.      Breath sounds: Normal breath sounds. No stridor.   Chest:      Chest wall: No tenderness.   Abdominal:      General: Bowel sounds are normal. There is no distension.      Palpations: Abdomen is soft. There is no mass.      Tenderness: There is no abdominal tenderness. There is no guarding or rebound.   Musculoskeletal:         General: No tenderness or deformity. Normal range of motion.      Cervical back: Normal range of motion and neck supple.   Lymphadenopathy:      Cervical: No cervical " adenopathy.   Skin:     General: Skin is warm and dry.      Findings: No erythema or rash.   Neurological:      Mental Status: She is alert and oriented to person, place, and time.      Deep Tendon Reflexes: Reflexes are normal and symmetric.   Psychiatric:         Thought Content: Thought content normal.         Lab Review:             Lab Results   Component Value Date    GLUCOSE 80 04/13/2021    BUN 12 04/13/2021    CREATININE 0.59 04/13/2021    EGFRIFNONA 113 04/13/2021    BCR 20.3 04/13/2021    K 4.0 04/13/2021    CO2 27.2 04/13/2021    CALCIUM 8.6 04/13/2021    ALBUMIN 4.60 01/18/2021    AST 17 01/18/2021    ALT 13 01/18/2021             Assessment:          Diagnosis Plan   1. Preop cardiovascular exam  ECG 12 Lead   2. hx Peripartum cardiomyopathy  ECG 12 Lead          Plan:       1.  Preoperative cardiac assessment-patient is at low cardiac risk for the anticipated surgical procedure.  She has an estimated risk probability for perioperative myocardial infarction or cardiac arrest of 0.01% (Rosa).  She does not meet guideline recommendations for any additional cardiac testing at this time.  2.  History of peripartum cardiomyopathy-previously demonstrated recovery of function and her cardiologist removed her from all medications.  No evidence of further cardiac issues.    Thank you very much for allowing us to participate in the care of this pleasant patient.  Please don't hesitate to call if I can be of assistance in any way.      Current Outpatient Medications:   •  albuterol sulfate  (90 Base) MCG/ACT inhaler, Inhale 2 puffs Every 4 (Four) Hours As Needed for Wheezing., Disp: , Rfl:   •  amphetamine-dextroamphetamine XR (ADDERALL XR) 20 MG 24 hr capsule, Take 1 capsule by mouth Every Morning, Disp: 30 capsule, Rfl: 0  •  pantoprazole (PROTONIX) 20 MG EC tablet, TAKE 1 TABLET BY MOUTH TWICE DAILY, Disp: 60 tablet, Rfl: 0  •  traZODone (DESYREL) 100 MG tablet, Take 1 tablet by mouth At Night As  Needed for Sleep., Disp: 90 tablet, Rfl: 2

## 2021-10-14 NOTE — TELEPHONE ENCOUNTER
Caller: Talisha Zepeda    Relationship: Self      Medication requested (name and dosage):    amphetamine-dextroamphetamine XR (ADDERALL XR) 20 MG 24 hr capsule  20 mg, Every Morning         Pharmacy where request should be sent: [id number] [place], [place] - [address] [facility] [address] [facility] [phone] [address] [phone] FX         Best call back number: [phone]     Does the patient have less than a 3 day supply:  [x] Yes  [] No    Alexandria Garvey, PCT   10/14/21 14:47 EDT

## 2021-10-15 RX ORDER — DEXTROAMPHETAMINE SACCHARATE, AMPHETAMINE ASPARTATE MONOHYDRATE, DEXTROAMPHETAMINE SULFATE AND AMPHETAMINE SULFATE 5; 5; 5; 5 MG/1; MG/1; MG/1; MG/1
20 CAPSULE, EXTENDED RELEASE ORAL EVERY MORNING
Qty: 30 CAPSULE | Refills: 0 | Status: ON HOLD | OUTPATIENT
Start: 2021-10-15 | End: 2021-11-04

## 2021-10-19 ENCOUNTER — HOSPITAL ENCOUNTER (OUTPATIENT)
Dept: MAMMOGRAPHY | Facility: HOSPITAL | Age: 41
Discharge: HOME | End: 2021-10-19
Admitting: OBSTETRICS & GYNECOLOGY

## 2021-10-19 DIAGNOSIS — Z13.9 SCREENING FOR CONDITION: ICD-10-CM

## 2021-10-19 PROCEDURE — 77067 SCR MAMMO BI INCL CAD: CPT

## 2021-10-19 PROCEDURE — 77063 BREAST TOMOSYNTHESIS BI: CPT

## 2021-10-25 NOTE — PROGRESS NOTES
GYN Annual Exam     CC- Here for annual exam.     Pt new to practice? No  Pt new to me? No     Talisha Zepeda is a 41 y.o. No obstetric history on file. female who presents for annual well woman exam. No LMP recorded. Patient has had an ablation.    Problems in addition to need for annual: none.  Pt for hysterectomy on 21    HPI: History of Present Illness    PMHX:  Patient Active Problem List   Diagnosis   • hx Peripartum cardiomyopathy   • Bilateral ovarian cysts   • Pelvic pain in female   • H/O bilateral salpingectomy   • HPV in female   • Menorrhagia with irregular cycle   • History of 3  sections: pt denies any classical sections   • History of endometrial ablation   ; otherwise none    OB History    No obstetric history on file.           Past Medical History:   Diagnosis Date   • ADHD    • Anxiety    • Asthma    • Chest pain, midsternal     states w/anxiety & activity, relieved w/tums, not sure if reflux or d/t anxiety   • Depression    • History of acquired CHF (congestive heart failure) 2015    with/after pregnancy   • History of gestational hypertension    • History of transfusion 2015    after delivery   • Hx gestational diabetes 2015   • Low back pain     right, h/o slipped disc   • Personal history of cardiomyopathy 2015    w/pregnancy, states treated by Dr Ortiz at [facility]       Past Surgical History:   Procedure Laterality Date   •  SECTION      x3   • CHIN IMPLANT INSERTION  2010    w/jaw surgery   • D & C HYSTEROSCOPY ENDOMETRIAL ABLATION N/A 2019    Procedure: INTRAUTERINE DEVICE REMOVAL  DIAGNOSTIC HYSTEROSCOPY, DILATATION AND CURETTAGE, NOVASURE ENDOMETRIAL ABLATION.;  Surgeon: Elvis Andre MD;  Location: [facility];  Service: Obstetrics/Gynecology   • TUBAL ABDOMINAL LIGATION           Current Outpatient Medications:   •  albuterol sulfate  (90 Base) MCG/ACT inhaler, Inhale 2 puffs Every 4 (Four) Hours As Needed for Wheezing., Disp: , Rfl:   •   "amphetamine-dextroamphetamine XR (ADDERALL XR) 20 MG 24 hr capsule, Take 1 capsule by mouth Every Morning, Disp: 30 capsule, Rfl: 0  •  methylPREDNISolone (MEDROL) 4 MG dose pack, Take as directed on package instructions., Disp: 21 tablet, Rfl: 0  •  pantoprazole (PROTONIX) 20 MG EC tablet, TAKE 1 TABLET BY MOUTH TWICE DAILY, Disp: 60 tablet, Rfl: 0  •  traZODone (DESYREL) 100 MG tablet, Take 1 tablet by mouth At Night As Needed for Sleep., Disp: 90 tablet, Rfl: 2    No Known Allergies    Social History     Tobacco Use   • Smoking status: Former Smoker     Years: 15.     Types: Cigarettes     Quit date:      Years since quittin.8   • Smokeless tobacco: Never Used   • Tobacco comment: states not sure how much she smoked   Vaping Use   • Vaping Use: Never used   Substance Use Topics   • Alcohol use: No     Comment: caff use   • Drug use: No       Talisha Zepeda  reports that she quit smoking about 6 years ago. Her smoking use included cigarettes. She quit after 15.00 years of use. She has never used smokeless tobacco..           Family History   Problem Relation Age of Onset   • Breast cancer Mother    • Kidney failure Mother    • Diabetes Mother    • Heart failure Mother    • Hypertension Mother    • Asthma Father    • Sleep apnea Father    • Heart failure Maternal Grandfather    • Malig Hyperthermia Neg Hx        Review of Systems    Patient reports that she is not currently experiencing any symptoms of urinary incontinence.      noTESTED FOR CHLAMYDIA?    EXAM:  /80   Ht 157.5 cm (62\")   Wt 58.5 kg (129 lb)   Breastfeeding No   BMI 23.59 kg/m²     Labs:   Lab Results (last 24 hours)     ** No results found for the last 24 hours. **          Physical Exam  Vitals and nursing note reviewed. Exam conducted with a chaperone present.   Constitutional:       General: She is not in acute distress.     Appearance: She is well-developed. She is not diaphoretic.   HENT:      Head: Normocephalic and " atraumatic.      Nose: Nose normal.   Eyes:      Extraocular Movements: Extraocular movements intact.   Cardiovascular:      Rate and Rhythm: Normal rate.   Pulmonary:      Effort: Pulmonary effort is normal.   Chest:   Breasts: Breasts are symmetrical.      Right: Normal. No mass, nipple discharge, skin change, tenderness or axillary adenopathy.      Left: Normal. No mass, nipple discharge, skin change, tenderness or axillary adenopathy.       Abdominal:      General: There is no distension.      Palpations: Abdomen is soft. There is no mass.      Tenderness: There is no abdominal tenderness. There is no guarding.   Genitourinary:     General: Normal vulva.      Pubic Area: No rash.       Vagina: Normal. No vaginal discharge.      Cervix: Normal.      Uterus: Normal.       Adnexa: Right adnexa normal and left adnexa normal.   Musculoskeletal:         General: No tenderness or deformity. Normal range of motion.      Cervical back: Normal range of motion.   Lymphadenopathy:      Upper Body:      Right upper body: No axillary adenopathy.      Left upper body: No axillary adenopathy.   Skin:     General: Skin is warm and dry.      Coloration: Skin is not pale.      Findings: No erythema or rash.   Neurological:      Mental Status: She is alert and oriented to person, place, and time.   Psychiatric:         Behavior: Behavior normal.         Thought Content: Thought content normal.         Judgment: Judgment normal.            As part of wellness and prevention, the following topics were discussed with the patient:  []  Nutrition  []  Physical activity/regular exercise   [x]  Healthy weight  []  Injury prevention  [x]  Substance misuse/abuse  []  Sexual behavior  []  STD prevention  []  Contaception  []  Dental health  [x]  Mental health  []  Immunization  [x]  Encouraged SBE     Counseling and guidance done:  Nutrition, physical activity, healthy weight, injury prevention, misuse of tobacco, alcohol and drugs, sexual  behavior and STDs, contraception, dental health, mental health, immunizations breast cancer screening and exams.    I saw the patient with a face mask, gloves and eye protection  The patient herself was masked.  Social distancing was observed as appropriate. All COVID precautions observed.      Assessment     1) GYN annual well woman exam.   2) PAP done today? Yes  3) problems addressed: none    MAMMOGRAM UP TO DATE IF AGE APPROPRIATE?  Yes    COLONOSCOPY UP TO DATE IF AGE APPROPRIATE? na    Fhx breast cancer? Yes: mother    Fhx ovarian cancer? No    Fhx colon cancer? No    Invitae testing offered? Yes           Plan       Follow up prn or one year.    Diagnoses and all orders for this visit:    1. Pap smear, low-risk (Primary)  -     IgP, Aptima HPV    2. Routine gynecological examination  -     IgP, Aptima HPV    3. Special screening examination for human papillomavirus (HPV)  -     IgP, Aptima HPV    4. H/O bilateral salpingectomy    5. History of 3  sections: pt denies any classical sections    6. History of endometrial ablation    7. Menorrhagia with irregular cycle        RTO Return in about 1 year (around 10/26/2022) for Annual physical.          Elvis Andre MD  [unfilled]  13:42 EDT

## 2021-10-26 ENCOUNTER — OFFICE VISIT (OUTPATIENT)
Dept: OBSTETRICS AND GYNECOLOGY | Facility: CLINIC | Age: 41
End: 2021-10-26

## 2021-10-26 VITALS
HEIGHT: 62 IN | DIASTOLIC BLOOD PRESSURE: 80 MMHG | SYSTOLIC BLOOD PRESSURE: 124 MMHG | WEIGHT: 129 LBS | BODY MASS INDEX: 23.74 KG/M2

## 2021-10-26 DIAGNOSIS — Z90.79 H/O BILATERAL SALPINGECTOMY: ICD-10-CM

## 2021-10-26 DIAGNOSIS — Z11.51 SPECIAL SCREENING EXAMINATION FOR HUMAN PAPILLOMAVIRUS (HPV): ICD-10-CM

## 2021-10-26 DIAGNOSIS — Z98.891 HISTORY OF 3 CESAREAN SECTIONS: ICD-10-CM

## 2021-10-26 DIAGNOSIS — Z01.419 PAP SMEAR, LOW-RISK: Primary | ICD-10-CM

## 2021-10-26 DIAGNOSIS — Z98.890 HISTORY OF ENDOMETRIAL ABLATION: ICD-10-CM

## 2021-10-26 DIAGNOSIS — Z01.419 ROUTINE GYNECOLOGICAL EXAMINATION: ICD-10-CM

## 2021-10-26 DIAGNOSIS — N92.1 MENORRHAGIA WITH IRREGULAR CYCLE: ICD-10-CM

## 2021-10-26 PROCEDURE — 99396 PREV VISIT EST AGE 40-64: CPT | Performed by: OBSTETRICS & GYNECOLOGY

## 2021-10-29 LAB
CYTOLOGIST CVX/VAG CYTO: NORMAL
CYTOLOGY CVX/VAG DOC CYTO: NORMAL
CYTOLOGY CVX/VAG DOC THIN PREP: NORMAL
DX ICD CODE: NORMAL
DX ICD CODE: NORMAL
HIV 1 & 2 AB SER-IMP: NORMAL
HPV I/H RISK 4 DNA CVX QL PROBE+SIG AMP: NEGATIVE
OTHER STN SPEC: NORMAL
PATHOLOGIST CVX/VAG CYTO: NORMAL
STAT OF ADQ CVX/VAG CYTO-IMP: NORMAL

## 2021-11-02 ENCOUNTER — PRE-ADMISSION TESTING (OUTPATIENT)
Dept: PREADMISSION TESTING | Facility: HOSPITAL | Age: 41
End: 2021-11-02

## 2021-11-02 ENCOUNTER — TELEPHONE (OUTPATIENT)
Dept: OBSTETRICS AND GYNECOLOGY | Facility: CLINIC | Age: 41
End: 2021-11-02

## 2021-11-02 VITALS
BODY MASS INDEX: 23.77 KG/M2 | OXYGEN SATURATION: 98 % | RESPIRATION RATE: 16 BRPM | SYSTOLIC BLOOD PRESSURE: 128 MMHG | HEART RATE: 74 BPM | HEIGHT: 62 IN | DIASTOLIC BLOOD PRESSURE: 84 MMHG | WEIGHT: 129.2 LBS

## 2021-11-02 DIAGNOSIS — N92.1 MENORRHAGIA WITH IRREGULAR CYCLE: ICD-10-CM

## 2021-11-02 DIAGNOSIS — Z98.890 HISTORY OF ENDOMETRIAL ABLATION: ICD-10-CM

## 2021-11-02 LAB
ABO GROUP BLD: NORMAL
ABO GROUP BLD: NORMAL
BLD GP AB SCN SERPL QL: NEGATIVE
HCG SERPL QL: NEGATIVE
RH BLD: POSITIVE
RH BLD: POSITIVE
SARS-COV-2 RNA PNL SPEC NAA+PROBE: NOT DETECTED
T&S EXPIRATION DATE: NORMAL

## 2021-11-02 PROCEDURE — 86900 BLOOD TYPING SEROLOGIC ABO: CPT | Performed by: OBSTETRICS & GYNECOLOGY

## 2021-11-02 PROCEDURE — 84703 CHORIONIC GONADOTROPIN ASSAY: CPT | Performed by: OBSTETRICS & GYNECOLOGY

## 2021-11-02 PROCEDURE — C9803 HOPD COVID-19 SPEC COLLECT: HCPCS

## 2021-11-02 PROCEDURE — 86901 BLOOD TYPING SEROLOGIC RH(D): CPT

## 2021-11-02 PROCEDURE — 85025 COMPLETE CBC W/AUTO DIFF WBC: CPT | Performed by: OBSTETRICS & GYNECOLOGY

## 2021-11-02 PROCEDURE — 86901 BLOOD TYPING SEROLOGIC RH(D): CPT | Performed by: OBSTETRICS & GYNECOLOGY

## 2021-11-02 PROCEDURE — 86850 RBC ANTIBODY SCREEN: CPT | Performed by: OBSTETRICS & GYNECOLOGY

## 2021-11-02 PROCEDURE — 36415 COLL VENOUS BLD VENIPUNCTURE: CPT

## 2021-11-02 PROCEDURE — 86900 BLOOD TYPING SEROLOGIC ABO: CPT

## 2021-11-02 PROCEDURE — 87635 SARS-COV-2 COVID-19 AMP PRB: CPT | Performed by: OBSTETRICS & GYNECOLOGY

## 2021-11-02 NOTE — TELEPHONE ENCOUNTER
PT is requesting to not stay the night after her surgery scheduled on 11/4/21. She said if everything goes well she wants to go home that night.

## 2021-11-02 NOTE — DISCHARGE INSTRUCTIONS
PRE-ADMISSION TESTING INSTRUCTIONS FOR ADULTS    Take these medications the morning of surgery with a small sip of water: pantoprazole and use inhaler      No aspirin, advil, aleve, ibuprofen, naproxen, diet pills, decongestants, or herbal/vitamins for a week prior to surgery.    General Instructions:    • Do not eat solid food after midnight the night before surgery.  No gum, mints, or hard candy after midnight the night before surgery.  • You may drink clear liquids the day of surgery up until 2 hours before your arrival time.  (4:30 am)  • Clear liquids are liquids you can see through. Nothing RED in color.    Plain water    Sports drinks  Sodas     Gelatin (Jell-O)  Fruit juices without pulp such as white grape juice and apple juice  Popsicles that contain no fruit or yogurt  Tea or coffee (no cream or milk added)    • It is beneficial for you to have a clear drink that contains carbohydrates just before you leave your house and before your fasting time begins.  We suggest a 20 ounce bottle of Gatorade or Powerade for non-diabetic patients or a 20 ounce bottle of G2 or Powerade Zero for diabetic patients.  (4:30 am)    • Patients who avoid smoking, chewing tobacco and alcohol for 4 weeks prior to surgery have a reduced risk of post-operative complications.  If at all possible, quit smoking as many days before surgery as you can.    • Do not smoke, use chewing tobacco or drink alcohol the day of surgery    • Bring your C-PAP/ BI-PAP machine if you use one.  • Wear clean comfortable clothes and socks.  • Do not wear contact lenses, lotion, deodorant, or make-up.  Bring a case for your glasses if applicable. You may brush your teeth the morning of surgery.  • You may wear dentures/partials, do not put adhesive/glue on them.    • Leave all other jewelry and valuables at home.      Preventing a Surgical Site Infection:    • Shower the night before and on the morning of surgery using the chlorhexidine soap you were  given.  Use a clean washcloth with the soap.  Place clean sheets on your bed after showering the night before surgery. Do not use the CHG soap on your hair, face, or private areas. Wash your body gently for five (5) minutes. Do not scrub your skin.  Dry with a clean towel and dress in clean clothing.    • Do not shave the surgical area for 10 days-2 weeks prior to surgery  because the razor can irritate skin and make it easier to develop an infection.  • Make sure you, your family, and all healthcare providers clean their hands with soap and water or an alcohol based hand  before caring for you or your wound.      Day of surgery:    Your surgeon’s office will advise you of your arrival time for the day of surgery.    Upon arrival, a Pre-op nurse and Anesthesia provider will review your health history, obtain vital signs, and answer questions you may have.  The only belongings needed at this time will be your home medications and if applicable your C-PAP/BI-PAP machine.  If you are staying overnight your family can leave the rest of your belongings in the car and bring them to your room later.  A Pre-op nurse will start an IV and you may receive medication in preparation for surgery, including something to help you relax.  Your family will be able to see you in the Pre-op area.  While you are in surgery your family should notify the waiting room  if they leave the waiting room area and provide a contact phone number.    IF you have any questions, you can call the Pre-Admission Department at [phone] or your surgeon's office.  Notify your surgeon if  you become sick, have a fever, productive cough, or cannot be here the day of surgery    Please be aware that surgery does come with discomfort.  We want to make every effort to control your discomfort so please discuss any uncontrolled symptoms with your nurse.   Your doctor will most likely have prescribed pain medications.      If you are  going home after surgery, you will receive individualized written care instructions before being discharged.  A responsible adult (over the age of 18) must drive you to and from the hospital on the day of your surgery and stay with you for 24 hours after anesthesia.    If you are staying overnight following surgery, you will be transported to your hospital room following the recovery period.  [facility] has all private rooms.    You may receive a survey regarding the care you received. Your feedback is very important and will be used to collect the necessary data to help us to continue to provide excellent care.     Deductibles and co-payments are collected on the day of service. Please be prepared to pay the required co-pay, deductible or deposit on the day of service as defined by your plan.

## 2021-11-02 NOTE — PAT
Pt here for PAT visit.  Pre-op tests completed, chg soap given, and instructions reviewed.  Instructed clears until 4:30 am dos, voiced understanding. COVID test pending. Cardiac clearance in notes.

## 2021-11-03 ENCOUNTER — ANESTHESIA EVENT (OUTPATIENT)
Dept: PERIOP | Facility: HOSPITAL | Age: 41
End: 2021-11-03

## 2021-11-03 NOTE — H&P
PREOPERATIVE HISTORY AND PHYSICAL      Patient Care Team:  Lit Garcia MD as PCP - General (Family Medicine)    Chief complaint: Menorrhagia with irregular cycle, hx tubal, failed ablation.    Pt is a 41 y.o.   Patient's last menstrual period was 10/29/2021 (approximate).     HPI:Pt is here for a TLH, poss RODOLFO for persistent vaginal bleeding refractory to an endometrial ablation.  Pt has had 3 csections and a tubal.  Pt desires definitive therapy.       PMHx:   Past Medical History:   Diagnosis Date   • ADHD    • Anxiety    • Asthma    • Chest pain, midsternal     states w/anxiety & activity, relieved w/tums, not sure if reflux or d/t anxiety   • Depression    • GERD (gastroesophageal reflux disease)    • History of acquired CHF (congestive heart failure)     with/after pregnancy   • History of gestational hypertension    • History of transfusion 2015    after delivery   • Hx gestational diabetes    • Low back pain     right, h/o slipped disc   • Migraines    • Personal history of cardiomyopathy     w/pregnancy, states treated by Dr Ortiz at [facility]       Current problem list:  Patient Active Problem List   Diagnosis   • hx Peripartum cardiomyopathy   • Bilateral ovarian cysts   • Pelvic pain in female   • H/O bilateral salpingectomy   • HPV in female   • Menorrhagia with irregular cycle   • History of 3  sections: pt denies any classical sections   • History of endometrial ablation       PSHx:   Past Surgical History:   Procedure Laterality Date   •  SECTION      x3   • CHIN IMPLANT INSERTION      w/jaw surgery   • D & C HYSTEROSCOPY ENDOMETRIAL ABLATION N/A 2019    Procedure: INTRAUTERINE DEVICE REMOVAL  DIAGNOSTIC HYSTEROSCOPY, DILATATION AND CURETTAGE, NOVASURE ENDOMETRIAL ABLATION.;  Surgeon: Elvis Andre MD;  Location: [facility];  Service: Obstetrics/Gynecology   • TUBAL ABDOMINAL LIGATION         Social Hx:   Social History     Socioeconomic History    • Marital status:    Tobacco Use   • Smoking status: Former Smoker     Years: 15.00     Types: Cigarettes     Quit date:      Years since quittin.8   • Smokeless tobacco: Never Used   Vaping Use   • Vaping Use: Never used   Substance and Sexual Activity   • Alcohol use: No     Comment: caff use   • Drug use: No   • Sexual activity: Yes     Partners: Male     Birth control/protection: None, Surgical       FHx:   Family History   Problem Relation Age of Onset   • Breast cancer Mother    • Kidney failure Mother    • Diabetes Mother    • Heart failure Mother    • Hypertension Mother    • Asthma Father    • Sleep apnea Father    • Heart failure Maternal Grandfather    • Malig Hyperthermia Neg Hx        Debilities/Disabilities Identified: None    Emotional Behavior: Appropriate    PGyn Hx:  otherwise noncontributory    POBHx:   OB History    Para Term  AB Living   6 4 3 1 2 4   SAB IAB Ectopic Molar Multiple Live Births   2 0 0 0 0 0      # Outcome Date GA Lbr Lucas/2nd Weight Sex Delivery Anes PTL Lv   6 SAB            5 SAB            4       CS-Unspec      3 Term      CS-Unspec      2 Term      CS-Unspec      1 Term      Vag-Spont          Allergies: Patient has no known allergies.    Medications:   No medications prior to admission.                            No current facility-administered medications for this encounter.    Current Outpatient Medications:   •  albuterol sulfate  (90 Base) MCG/ACT inhaler, Inhale 2 puffs Every 4 (Four) Hours As Needed for Wheezing., Disp: , Rfl:   •  amphetamine-dextroamphetamine XR (ADDERALL XR) 20 MG 24 hr capsule, Take 1 capsule by mouth Every Morning, Disp: 30 capsule, Rfl: 0  •  pantoprazole (PROTONIX) 20 MG EC tablet, TAKE 1 TABLET BY MOUTH TWICE DAILY (Patient taking differently: Take 20 mg by mouth Daily.), Disp: 60 tablet, Rfl: 0  •  traZODone (DESYREL) 100 MG tablet, Take 1 tablet by mouth At Night As Needed for Sleep., Disp: 90  tablet, Rfl: 2        Review of Systems   Constitutional: Negative.    HENT: Negative.    Eyes: Negative.    Respiratory: Negative.    Cardiovascular: Negative.    Gastrointestinal: Negative.    Endocrine: Negative.    Musculoskeletal: Negative.    Skin: Negative.    Allergic/Immunologic: Negative.    Neurological: Negative.    Hematological: Negative.    Psychiatric/Behavioral: Negative.        Vital Signs  [facility] 10/29/2021 (Approximate) Comment: blee    Physical Exam  Vitals and nursing note reviewed.   Constitutional:       Appearance: She is well-developed.   HENT:      Head: Normocephalic and atraumatic.   Cardiovascular:      Rate and Rhythm: Normal rate.   Pulmonary:      Effort: Pulmonary effort is normal.   Abdominal:      General: There is no distension.      Palpations: Abdomen is soft. There is no mass.      Tenderness: There is no abdominal tenderness. There is no guarding.   Genitourinary:     Vagina: No vaginal discharge.   Musculoskeletal:         General: No tenderness or deformity. Normal range of motion.      Cervical back: Normal range of motion.   Skin:     General: Skin is warm and dry.      Coloration: Skin is not pale.      Findings: No erythema or rash.   Neurological:      Mental Status: She is alert and oriented to person, place, and time.   Psychiatric:         Behavior: Behavior normal.         Thought Content: Thought content normal.         Judgment: Judgment normal.             IMPRESSION:    Menorrhagia with irregular cycle, failed ablation,  Hx tubal, hx CS x 3.                                    PLAN:    Procedure(s):  TOTAL LAPAROSCOPIC HYSTERECTOMY, POSSIBLE TOTAL ABDOMINAL HYSTERECTOMY    RISKS, ALTERNATIVES, COMPLICATIONS OF THE PROCEDURE INCLUDING BUT NOT LIMITED TO:    INTRAOPERATIVE RISKS: INJURY TO INTERNAL AND ADJACENT ORGANS AND STRUCTURES (BOWEL, BLADDER, URETER,BLOOD VESSELS) OR HEMORRHAGE REQUIRING FURTHER SURGERY (LAPAROTOMY),  POSSIBLE NON-DIAGNOSTIC FINDINGS,  DISCOVERY OF POSSIBLE MALIGNANCY, INFECTION, AND DEATH;   POSTOP COMPLICATIONS: BLEEDING, INFECTION (REQUIRING POSSIBLE REOPERATION), FAILURE OF GOAL OF SURGERY AND RECURRENCE OF ORIGINAL SYMPTOMS, PNEUMONIA, PULMONARY EMBOLISM, AND DEATH;  WERE EXPLAINED TO THE PT WHO VERBALIZED HER UNDERSTANDING.             I discussed the patients findings and my recommendations with patient.     Elvis Andre MD  11/03/21  08:40 EDT

## 2021-11-04 ENCOUNTER — HOSPITAL ENCOUNTER (OUTPATIENT)
Facility: HOSPITAL | Age: 41
Discharge: HOME | End: 2021-11-05
Attending: OBSTETRICS & GYNECOLOGY | Admitting: OBSTETRICS & GYNECOLOGY

## 2021-11-04 ENCOUNTER — ANESTHESIA (OUTPATIENT)
Dept: PERIOP | Facility: HOSPITAL | Age: 41
End: 2021-11-04

## 2021-11-04 DIAGNOSIS — N92.1 MENORRHAGIA WITH IRREGULAR CYCLE: ICD-10-CM

## 2021-11-04 DIAGNOSIS — Z90.710 S/P LAPAROSCOPIC HYSTERECTOMY: Primary | ICD-10-CM

## 2021-11-04 DIAGNOSIS — Z98.890 HISTORY OF ENDOMETRIAL ABLATION: ICD-10-CM

## 2021-11-04 PROCEDURE — 25010000002 DEXAMETHASONE PER 1 MG: Performed by: NURSE ANESTHETIST, CERTIFIED REGISTERED

## 2021-11-04 PROCEDURE — 88307 TISSUE EXAM BY PATHOLOGIST: CPT | Performed by: OBSTETRICS & GYNECOLOGY

## 2021-11-04 PROCEDURE — 25010000002 NEOSTIGMINE 10 MG/10ML SOLUTION: Performed by: NURSE ANESTHETIST, CERTIFIED REGISTERED

## 2021-11-04 PROCEDURE — 25010000002 KETOROLAC TROMETHAMINE PER 15 MG: Performed by: NURSE ANESTHETIST, CERTIFIED REGISTERED

## 2021-11-04 PROCEDURE — 25010000002 MIDAZOLAM PER 1MG: Performed by: NURSE ANESTHETIST, CERTIFIED REGISTERED

## 2021-11-04 PROCEDURE — 94761 N-INVAS EAR/PLS OXIMETRY MLT: CPT

## 2021-11-04 PROCEDURE — 58571 TLH W/T/O 250 G OR LESS: CPT | Performed by: OBSTETRICS & GYNECOLOGY

## 2021-11-04 PROCEDURE — 94799 UNLISTED PULMONARY SVC/PX: CPT

## 2021-11-04 PROCEDURE — 25010000002 FENTANYL CITRATE (PF) 50 MCG/ML SOLUTION: Performed by: NURSE ANESTHETIST, CERTIFIED REGISTERED

## 2021-11-04 PROCEDURE — G0378 HOSPITAL OBSERVATION PER HR: HCPCS

## 2021-11-04 PROCEDURE — 58571 TLH W/T/O 250 G OR LESS: CPT | Performed by: SPECIALIST/TECHNOLOGIST, OTHER

## 2021-11-04 PROCEDURE — 25010000002 PROPOFOL 10 MG/ML EMULSION: Performed by: NURSE ANESTHETIST, CERTIFIED REGISTERED

## 2021-11-04 PROCEDURE — 0 CEFAZOLIN SODIUM-DEXTROSE 2-3 GM-%(50ML) RECONSTITUTED SOLUTION: Performed by: OBSTETRICS & GYNECOLOGY

## 2021-11-04 PROCEDURE — 25010000002 ONDANSETRON PER 1 MG: Performed by: NURSE ANESTHETIST, CERTIFIED REGISTERED

## 2021-11-04 PROCEDURE — 25010000002 PROMETHAZINE PER 50 MG: Performed by: NURSE ANESTHETIST, CERTIFIED REGISTERED

## 2021-11-04 PROCEDURE — 0 MORPHINE PER 10 MG: Performed by: NURSE ANESTHETIST, CERTIFIED REGISTERED

## 2021-11-04 RX ORDER — ACETAMINOPHEN 500 MG
500 TABLET ORAL ONCE
Status: COMPLETED | OUTPATIENT
Start: 2021-11-04 | End: 2021-11-04

## 2021-11-04 RX ORDER — ROCURONIUM BROMIDE 10 MG/ML
INJECTION, SOLUTION INTRAVENOUS AS NEEDED
Status: DISCONTINUED | OUTPATIENT
Start: 2021-11-04 | End: 2021-11-04 | Stop reason: SURG

## 2021-11-04 RX ORDER — SODIUM CHLORIDE, SODIUM LACTATE, POTASSIUM CHLORIDE, CALCIUM CHLORIDE 600; 310; 30; 20 MG/100ML; MG/100ML; MG/100ML; MG/100ML
100 INJECTION, SOLUTION INTRAVENOUS CONTINUOUS
Status: DISCONTINUED | OUTPATIENT
Start: 2021-11-04 | End: 2021-11-04

## 2021-11-04 RX ORDER — SODIUM CHLORIDE 9 MG/ML
40 INJECTION, SOLUTION INTRAVENOUS AS NEEDED
Status: DISCONTINUED | OUTPATIENT
Start: 2021-11-04 | End: 2021-11-04 | Stop reason: HOSPADM

## 2021-11-04 RX ORDER — LIDOCAINE HYDROCHLORIDE 10 MG/ML
0.5 INJECTION, SOLUTION EPIDURAL; INFILTRATION; INTRACAUDAL; PERINEURAL ONCE AS NEEDED
Status: DISCONTINUED | OUTPATIENT
Start: 2021-11-04 | End: 2021-11-04 | Stop reason: HOSPADM

## 2021-11-04 RX ORDER — DIPHENHYDRAMINE HYDROCHLORIDE 50 MG/ML
25 INJECTION INTRAMUSCULAR; INTRAVENOUS EVERY 4 HOURS PRN
Status: DISCONTINUED | OUTPATIENT
Start: 2021-11-04 | End: 2021-11-05 | Stop reason: HOSPADM

## 2021-11-04 RX ORDER — ONDANSETRON 2 MG/ML
4 INJECTION INTRAMUSCULAR; INTRAVENOUS ONCE AS NEEDED
Status: COMPLETED | OUTPATIENT
Start: 2021-11-04 | End: 2021-11-04

## 2021-11-04 RX ORDER — SODIUM CHLORIDE 0.9 % (FLUSH) 0.9 %
10 SYRINGE (ML) INJECTION EVERY 12 HOURS SCHEDULED
Status: DISCONTINUED | OUTPATIENT
Start: 2021-11-04 | End: 2021-11-04 | Stop reason: HOSPADM

## 2021-11-04 RX ORDER — TRAZODONE HYDROCHLORIDE 50 MG/1
100 TABLET ORAL NIGHTLY PRN
Status: DISCONTINUED | OUTPATIENT
Start: 2021-11-04 | End: 2021-11-05 | Stop reason: HOSPADM

## 2021-11-04 RX ORDER — OXYCODONE HYDROCHLORIDE AND ACETAMINOPHEN 5; 325 MG/1; MG/1
1 TABLET ORAL EVERY 4 HOURS PRN
Status: DISPENSED | OUTPATIENT
Start: 2021-11-04 | End: 2021-11-05

## 2021-11-04 RX ORDER — KETOROLAC TROMETHAMINE 30 MG/ML
INJECTION, SOLUTION INTRAMUSCULAR; INTRAVENOUS AS NEEDED
Status: DISCONTINUED | OUTPATIENT
Start: 2021-11-04 | End: 2021-11-04 | Stop reason: SURG

## 2021-11-04 RX ORDER — MORPHINE SULFATE 0.5 MG/ML
INJECTION, SOLUTION EPIDURAL; INTRATHECAL; INTRAVENOUS AS NEEDED
Status: DISCONTINUED | OUTPATIENT
Start: 2021-11-04 | End: 2021-11-04 | Stop reason: SURG

## 2021-11-04 RX ORDER — DEXAMETHASONE SODIUM PHOSPHATE 4 MG/ML
4 INJECTION, SOLUTION INTRA-ARTICULAR; INTRALESIONAL; INTRAMUSCULAR; INTRAVENOUS; SOFT TISSUE ONCE AS NEEDED
Status: COMPLETED | OUTPATIENT
Start: 2021-11-04 | End: 2021-11-04

## 2021-11-04 RX ORDER — MIDAZOLAM HYDROCHLORIDE 2 MG/2ML
1 INJECTION, SOLUTION INTRAMUSCULAR; INTRAVENOUS
Status: DISCONTINUED | OUTPATIENT
Start: 2021-11-04 | End: 2021-11-04 | Stop reason: HOSPADM

## 2021-11-04 RX ORDER — PROPOFOL 10 MG/ML
VIAL (ML) INTRAVENOUS AS NEEDED
Status: DISCONTINUED | OUTPATIENT
Start: 2021-11-04 | End: 2021-11-04 | Stop reason: SURG

## 2021-11-04 RX ORDER — OXYCODONE HYDROCHLORIDE AND ACETAMINOPHEN 5; 325 MG/1; MG/1
2 TABLET ORAL EVERY 4 HOURS PRN
Status: DISCONTINUED | OUTPATIENT
Start: 2021-11-04 | End: 2021-11-05 | Stop reason: HOSPADM

## 2021-11-04 RX ORDER — FAMOTIDINE 10 MG/ML
20 INJECTION, SOLUTION INTRAVENOUS
Status: COMPLETED | OUTPATIENT
Start: 2021-11-04 | End: 2021-11-04

## 2021-11-04 RX ORDER — LIDOCAINE HYDROCHLORIDE 20 MG/ML
INJECTION, SOLUTION INFILTRATION; PERINEURAL AS NEEDED
Status: DISCONTINUED | OUTPATIENT
Start: 2021-11-04 | End: 2021-11-04 | Stop reason: SURG

## 2021-11-04 RX ORDER — IBUPROFEN 800 MG/1
800 TABLET ORAL EVERY 8 HOURS PRN
Status: DISCONTINUED | OUTPATIENT
Start: 2021-11-04 | End: 2021-11-05 | Stop reason: HOSPADM

## 2021-11-04 RX ORDER — ONDANSETRON 2 MG/ML
4 INJECTION INTRAMUSCULAR; INTRAVENOUS ONCE AS NEEDED
Status: DISCONTINUED | OUTPATIENT
Start: 2021-11-04 | End: 2021-11-04 | Stop reason: HOSPADM

## 2021-11-04 RX ORDER — SODIUM CHLORIDE, SODIUM LACTATE, POTASSIUM CHLORIDE, CALCIUM CHLORIDE 600; 310; 30; 20 MG/100ML; MG/100ML; MG/100ML; MG/100ML
150 INJECTION, SOLUTION INTRAVENOUS CONTINUOUS
Status: DISCONTINUED | OUTPATIENT
Start: 2021-11-04 | End: 2021-11-05 | Stop reason: HOSPADM

## 2021-11-04 RX ORDER — DIPHENHYDRAMINE HCL 25 MG
25 CAPSULE ORAL EVERY 4 HOURS PRN
Status: DISCONTINUED | OUTPATIENT
Start: 2021-11-04 | End: 2021-11-05 | Stop reason: HOSPADM

## 2021-11-04 RX ORDER — KETOROLAC TROMETHAMINE 30 MG/ML
30 INJECTION, SOLUTION INTRAMUSCULAR; INTRAVENOUS EVERY 6 HOURS
Status: DISPENSED | OUTPATIENT
Start: 2021-11-04 | End: 2021-11-05

## 2021-11-04 RX ORDER — KETAMINE HYDROCHLORIDE 10 MG/ML
INJECTION INTRAMUSCULAR; INTRAVENOUS AS NEEDED
Status: DISCONTINUED | OUTPATIENT
Start: 2021-11-04 | End: 2021-11-04 | Stop reason: SURG

## 2021-11-04 RX ORDER — OXYCODONE HYDROCHLORIDE AND ACETAMINOPHEN 5; 325 MG/1; MG/1
1 TABLET ORAL ONCE AS NEEDED
Status: COMPLETED | OUTPATIENT
Start: 2021-11-04 | End: 2021-11-04

## 2021-11-04 RX ORDER — DIPHENHYDRAMINE HYDROCHLORIDE 50 MG/ML
12.5 INJECTION INTRAMUSCULAR; INTRAVENOUS
Status: DISCONTINUED | OUTPATIENT
Start: 2021-11-04 | End: 2021-11-04 | Stop reason: HOSPADM

## 2021-11-04 RX ORDER — MAGNESIUM HYDROXIDE 1200 MG/15ML
LIQUID ORAL AS NEEDED
Status: DISCONTINUED | OUTPATIENT
Start: 2021-11-04 | End: 2021-11-04 | Stop reason: HOSPADM

## 2021-11-04 RX ORDER — SODIUM CHLORIDE 0.9 % (FLUSH) 0.9 %
10 SYRINGE (ML) INJECTION AS NEEDED
Status: DISCONTINUED | OUTPATIENT
Start: 2021-11-04 | End: 2021-11-04 | Stop reason: HOSPADM

## 2021-11-04 RX ORDER — KETOROLAC TROMETHAMINE 30 MG/ML
30 INJECTION, SOLUTION INTRAMUSCULAR; INTRAVENOUS EVERY 6 HOURS PRN
Status: DISCONTINUED | OUTPATIENT
Start: 2021-11-04 | End: 2021-11-04 | Stop reason: HOSPADM

## 2021-11-04 RX ORDER — GLYCOPYRROLATE 0.2 MG/ML
INJECTION INTRAMUSCULAR; INTRAVENOUS AS NEEDED
Status: DISCONTINUED | OUTPATIENT
Start: 2021-11-04 | End: 2021-11-04 | Stop reason: SURG

## 2021-11-04 RX ORDER — ALBUTEROL SULFATE 90 UG/1
2 AEROSOL, METERED RESPIRATORY (INHALATION) EVERY 4 HOURS PRN
Status: DISCONTINUED | OUTPATIENT
Start: 2021-11-04 | End: 2021-11-05 | Stop reason: HOSPADM

## 2021-11-04 RX ORDER — BUPIVACAINE HYDROCHLORIDE 5 MG/ML
INJECTION, SOLUTION PERINEURAL
Status: COMPLETED | OUTPATIENT
Start: 2021-11-04 | End: 2021-11-04

## 2021-11-04 RX ORDER — CEFAZOLIN SODIUM 2 G/50ML
2 SOLUTION INTRAVENOUS ONCE
Status: COMPLETED | OUTPATIENT
Start: 2021-11-04 | End: 2021-11-04

## 2021-11-04 RX ORDER — ONDANSETRON 2 MG/ML
4 INJECTION INTRAMUSCULAR; INTRAVENOUS EVERY 6 HOURS PRN
Status: DISCONTINUED | OUTPATIENT
Start: 2021-11-04 | End: 2021-11-05 | Stop reason: HOSPADM

## 2021-11-04 RX ORDER — ONDANSETRON 2 MG/ML
4 INJECTION INTRAMUSCULAR; INTRAVENOUS ONCE AS NEEDED
Status: ACTIVE | OUTPATIENT
Start: 2021-11-04 | End: 2021-11-05

## 2021-11-04 RX ORDER — POLYETHYLENE GLYCOL 3350 17 G/17G
17 POWDER, FOR SOLUTION ORAL DAILY PRN
Status: DISCONTINUED | OUTPATIENT
Start: 2021-11-04 | End: 2021-11-05 | Stop reason: HOSPADM

## 2021-11-04 RX ORDER — DOCUSATE SODIUM 100 MG/1
100 CAPSULE, LIQUID FILLED ORAL 2 TIMES DAILY
Status: DISCONTINUED | OUTPATIENT
Start: 2021-11-04 | End: 2021-11-05 | Stop reason: HOSPADM

## 2021-11-04 RX ORDER — SODIUM CHLORIDE, SODIUM LACTATE, POTASSIUM CHLORIDE, CALCIUM CHLORIDE 600; 310; 30; 20 MG/100ML; MG/100ML; MG/100ML; MG/100ML
9 INJECTION, SOLUTION INTRAVENOUS CONTINUOUS PRN
Status: DISCONTINUED | OUTPATIENT
Start: 2021-11-04 | End: 2021-11-05 | Stop reason: HOSPADM

## 2021-11-04 RX ORDER — ONDANSETRON 4 MG/1
4 TABLET, FILM COATED ORAL EVERY 6 HOURS PRN
Status: DISCONTINUED | OUTPATIENT
Start: 2021-11-04 | End: 2021-11-05 | Stop reason: HOSPADM

## 2021-11-04 RX ORDER — EPHEDRINE SULFATE 50 MG/ML
INJECTION, SOLUTION INTRAVENOUS AS NEEDED
Status: DISCONTINUED | OUTPATIENT
Start: 2021-11-04 | End: 2021-11-04 | Stop reason: SURG

## 2021-11-04 RX ORDER — NEOSTIGMINE METHYLSULFATE 1 MG/ML
INJECTION, SOLUTION INTRAVENOUS AS NEEDED
Status: DISCONTINUED | OUTPATIENT
Start: 2021-11-04 | End: 2021-11-04 | Stop reason: SURG

## 2021-11-04 RX ORDER — OXYCODONE AND ACETAMINOPHEN 10; 325 MG/1; MG/1
1 TABLET ORAL EVERY 4 HOURS PRN
Status: ACTIVE | OUTPATIENT
Start: 2021-11-04 | End: 2021-11-05

## 2021-11-04 RX ORDER — FENTANYL CITRATE 50 UG/ML
INJECTION, SOLUTION INTRAMUSCULAR; INTRAVENOUS AS NEEDED
Status: DISCONTINUED | OUTPATIENT
Start: 2021-11-04 | End: 2021-11-04 | Stop reason: SURG

## 2021-11-04 RX ORDER — FENTANYL CITRATE 50 UG/ML
25 INJECTION, SOLUTION INTRAMUSCULAR; INTRAVENOUS
Status: DISCONTINUED | OUTPATIENT
Start: 2021-11-04 | End: 2021-11-04 | Stop reason: HOSPADM

## 2021-11-04 RX ADMIN — PROMETHAZINE HYDROCHLORIDE 6.25 MG: 25 INJECTION INTRAMUSCULAR; INTRAVENOUS at 12:49

## 2021-11-04 RX ADMIN — PROPOFOL 50 MG: 10 INJECTION, EMULSION INTRAVENOUS at 08:35

## 2021-11-04 RX ADMIN — FAMOTIDINE 20 MG: 10 INJECTION INTRAVENOUS at 07:39

## 2021-11-04 RX ADMIN — FENTANYL CITRATE 50 MCG: 50 INJECTION INTRAMUSCULAR; INTRAVENOUS at 08:10

## 2021-11-04 RX ADMIN — EPHEDRINE SULFATE 10 MG: 50 INJECTION, SOLUTION INTRAVENOUS at 09:41

## 2021-11-04 RX ADMIN — SODIUM CHLORIDE, POTASSIUM CHLORIDE, SODIUM LACTATE AND CALCIUM CHLORIDE 9 ML/HR: 600; 310; 30; 20 INJECTION, SOLUTION INTRAVENOUS at 07:39

## 2021-11-04 RX ADMIN — SODIUM CHLORIDE, POTASSIUM CHLORIDE, SODIUM LACTATE AND CALCIUM CHLORIDE 150 ML/HR: 600; 310; 30; 20 INJECTION, SOLUTION INTRAVENOUS at 12:49

## 2021-11-04 RX ADMIN — LIDOCAINE HYDROCHLORIDE 100 MG: 20 INJECTION, SOLUTION INFILTRATION; PERINEURAL at 08:10

## 2021-11-04 RX ADMIN — DEXAMETHASONE SODIUM PHOSPHATE 4 MG: 4 INJECTION, SOLUTION INTRAMUSCULAR; INTRAVENOUS at 07:39

## 2021-11-04 RX ADMIN — OXYCODONE HYDROCHLORIDE AND ACETAMINOPHEN 1 TABLET: 5; 325 TABLET ORAL at 10:59

## 2021-11-04 RX ADMIN — ACETAMINOPHEN 500 MG: 500 TABLET, FILM COATED ORAL at 07:38

## 2021-11-04 RX ADMIN — KETOROLAC TROMETHAMINE 30 MG: 30 INJECTION, SOLUTION INTRAMUSCULAR; INTRAVENOUS at 09:29

## 2021-11-04 RX ADMIN — NEOSTIGMINE METHYLSULFATE 2.5 MG: 1 INJECTION INTRAVENOUS at 09:31

## 2021-11-04 RX ADMIN — FENTANYL CITRATE 50 MCG: 50 INJECTION INTRAMUSCULAR; INTRAVENOUS at 08:35

## 2021-11-04 RX ADMIN — FENTANYL CITRATE 25 MCG: 50 INJECTION, SOLUTION INTRAMUSCULAR; INTRAVENOUS at 10:33

## 2021-11-04 RX ADMIN — SODIUM CHLORIDE, POTASSIUM CHLORIDE, SODIUM LACTATE AND CALCIUM CHLORIDE 150 ML/HR: 600; 310; 30; 20 INJECTION, SOLUTION INTRAVENOUS at 21:18

## 2021-11-04 RX ADMIN — KETOROLAC TROMETHAMINE 30 MG: 30 INJECTION, SOLUTION INTRAMUSCULAR; INTRAVENOUS at 20:23

## 2021-11-04 RX ADMIN — KETAMINE HYDROCHLORIDE 20 MG: 10 INJECTION, SOLUTION INTRAMUSCULAR; INTRAVENOUS at 08:25

## 2021-11-04 RX ADMIN — ROCURONIUM BROMIDE 40 MG: 10 INJECTION INTRAVENOUS at 08:10

## 2021-11-04 RX ADMIN — GLYCOPYRROLATE 0.4 MG: 0.2 INJECTION INTRAMUSCULAR; INTRAVENOUS at 09:31

## 2021-11-04 RX ADMIN — BUPIVACAINE HYDROCHLORIDE 1 ML: 5 INJECTION, SOLUTION PERINEURAL at 08:07

## 2021-11-04 RX ADMIN — CEFAZOLIN SODIUM 2 G: 2 SOLUTION INTRAVENOUS at 08:15

## 2021-11-04 RX ADMIN — PROPOFOL 130 MG: 10 INJECTION, EMULSION INTRAVENOUS at 08:10

## 2021-11-04 RX ADMIN — ONDANSETRON 4 MG: 2 INJECTION INTRAMUSCULAR; INTRAVENOUS at 07:39

## 2021-11-04 RX ADMIN — MIDAZOLAM HYDROCHLORIDE 1 MG: 1 INJECTION, SOLUTION INTRAMUSCULAR; INTRAVENOUS at 07:51

## 2021-11-04 RX ADMIN — MORPHINE SULFATE 0.2 MG: 0.5 INJECTION EPIDURAL; INTRATHECAL; INTRAVENOUS at 08:07

## 2021-11-04 RX ADMIN — KETAMINE HYDROCHLORIDE 20 MG: 10 INJECTION, SOLUTION INTRAMUSCULAR; INTRAVENOUS at 08:10

## 2021-11-04 RX ADMIN — DOCUSATE SODIUM 100 MG: 100 CAPSULE, LIQUID FILLED ORAL at 20:23

## 2021-11-04 NOTE — PLAN OF CARE
Problem: Adult Inpatient Plan of Care  Goal: Plan of Care Review  11/4/2021 1431 by Shruti Haile RN  Outcome: Ongoing, Progressing  Flowsheets (Taken 11/4/2021 1429)  Outcome Summary: vss, phenergan given for nausea, patient able to eat some food, no drainage from incisions from TLH  11/4/2021 1429 by Shruti Haile RN  Outcome: Ongoing, Progressing  Flowsheets (Taken 11/4/2021 1429)  Progress: improving  Plan of Care Reviewed With: patient  Outcome Summary: vss, phenergan given for nausea, patient able to eat some food, no drainage from incisions from TLH  Goal: Patient-Specific Goal (Individualized)  11/4/2021 1431 by Shruti Haile RN  Outcome: Ongoing, Progressing  11/4/2021 1429 by Shruti Haile RN  Outcome: Ongoing, Progressing  Goal: Absence of Hospital-Acquired Illness or Injury  11/4/2021 1431 by Shruti Haile RN  Outcome: Ongoing, Progressing  11/4/2021 1429 by Shruti Haile RN  Outcome: Ongoing, Progressing  Intervention: Identify and Manage Fall Risk  Recent Flowsheet Documentation  Taken 11/4/2021 1249 by Shruti Haile RN  Safety Promotion/Fall Prevention:   safety round/check completed   nonskid shoes/slippers when out of bed  Taken 11/4/2021 1130 by Shruti Haile RN  Safety Promotion/Fall Prevention: safety round/check completed  Goal: Optimal Comfort and Wellbeing  11/4/2021 1431 by Shruti Haile RN  Outcome: Ongoing, Progressing  11/4/2021 1429 by Shruti Haile RN  Outcome: Ongoing, Progressing  Intervention: Provide Person-Centered Care  Recent Flowsheet Documentation  Taken 11/4/2021 1130 by Shruti Haile RN  Trust Relationship/Rapport:   care explained   choices provided   thoughts/feelings acknowledged   reassurance provided   questions answered   questions encouraged  Goal: Readiness for Transition of Care  11/4/2021 1431 by Shruti Haile RN  Outcome: Ongoing, Progressing  11/4/2021 1429 by Shruti Haile RN  Outcome: Ongoing, Progressing     Problem: Bleeding  (Hysterectomy)  Goal: Absence of Bleeding  Outcome: Ongoing, Progressing     Problem: Bowel Elimination Impaired (Hysterectomy)  Goal: Effective Bowel Elimination  Outcome: Ongoing, Progressing     Problem: Infection (Hysterectomy)  Goal: Absence of Infection Signs and Symptoms  Outcome: Ongoing, Progressing     Problem: Ongoing Anesthesia Effects (Hysterectomy)  Goal: Anesthesia/Sedation Recovery  Outcome: Ongoing, Progressing  Intervention: Optimize Anesthesia Recovery  Recent Flowsheet Documentation  Taken 11/4/2021 1249 by Shruti Haile RN  Safety Promotion/Fall Prevention:   safety round/check completed   nonskid shoes/slippers when out of bed  Taken 11/4/2021 1130 by Shruti Haile RN  Safety Promotion/Fall Prevention: safety round/check completed     Problem: Pain (Hysterectomy)  Goal: Acceptable Pain Control  Outcome: Ongoing, Progressing  Intervention: Prevent or Manage Pain  Recent Flowsheet Documentation  Taken 11/4/2021 1130 by Shruti Haile RN  Pain Management Interventions: (perc. was given about 30 min ago) other (see comments)     Problem: Postoperative Nausea and Vomiting (Hysterectomy)  Goal: Nausea and Vomiting Relief  Outcome: Ongoing, Progressing     Problem: Postoperative Urinary Retention (Hysterectomy)  Goal: Effective Urinary Elimination  Outcome: Ongoing, Progressing   Goal Outcome Evaluation:  Plan of Care Reviewed With: patient        Progress: improving  Outcome Summary: vss, phenergan given for nausea, patient able to eat some food, no drainage from incisions from TLH

## 2021-11-04 NOTE — INTERVAL H&P NOTE
H&P reviewed. The patient was examined and there are no changes to the H&P.    /91   Pulse 98   Temp 98.5 °F (36.9 °C) (Oral)   Resp 16   Wt 58.2 kg (128 lb 6.4 oz)   LMP 10/29/2021 (Approximate) Comment: blee  SpO2 97%   BMI 23.48 kg/m²     Medications Prior to Admission   Medication Sig Dispense Refill Last Dose    amphetamine-dextroamphetamine XR (ADDERALL XR) 20 MG 24 hr capsule Take 1 capsule by mouth Every Morning 30 capsule 0 11/3/2021 at Unknown time    pantoprazole (PROTONIX) 20 MG EC tablet TAKE 1 TABLET BY MOUTH TWICE DAILY (Patient taking differently: Take 20 mg by mouth Daily.) 60 tablet 0 11/4/2021 at 0530    albuterol sulfate  (90 Base) MCG/ACT inhaler Inhale 2 puffs Every 4 (Four) Hours As Needed for Wheezing.   Unknown at Unknown time    traZODone (DESYREL) 100 MG tablet Take 1 tablet by mouth At Night As Needed for Sleep. 90 tablet 2 More than a month at Unknown time

## 2021-11-04 NOTE — OP NOTE
OPERATIVE NOTE    PROCEDURE: TOTAL LAPAROSCOPIC HYSTERECTOMY    PRE OP DIAGNOSIS:  Chronic abnormal bleeding refractory to medical and surgical therapy, history of tubal ligation, history of 3  sections.     POSTOP DIAGNOSIS:  same    SURGEON:  Elvis Andre MD    ASSISTANT:  Assistant: Charlie Jhaveri CSA was responsible for performing the following activities: Retraction, Suction, Irrigation, Suturing, Closing, Placing Dressing and Held/Positioned Camera and their skilled assistance was necessary for the success of this case.    FINDINGS:  Normal ovaries, appendix.  Gallbladder not seen.    EBL:  20cc    URINE OUTPUT:  300cc    IVFS:  600cc    COMPLICATIONS:  none    DRAINS:  none    SPECIMENS: uterus, cervix    ANTIBIOTICS:  2g Kefzol        OPERATIVE NARRATIVE:    After informed consent was obtained, pt was taken to the OR and placed on table in dorsosupine position.  GETA & OGT were placed without difficulty.      Time out was done.  Antibiotics were given.   Pt was then placed in the dorsolithotomy position after an exam under anesthesia was performed.    Pt was then prepped and draped in the usual fashion.  The TULIO and centeno catheter were placed by the assistant per protocol.    A LUQ 5mm port was placed in the midaxillary, subcostal region without difficulty and the abdominal cavity was insufflated with CO2.  Camera was placed and the pelvic and abdominal cavities were seen in their entirety.  There were no abnormalities.  The appendix was normal, gallbladder not seen.  The TULIO rim was seen clearly circumferentially and the cul-de-sac was clear.  The ureters were visualized bilaterally and found to be well away from the area of intended disection.  Another 5mm port was placed in the infraumbilicus.    A 5mm port was placed in the R flank area, in the subumbilical area, and a 10-12mm port placed in the L flank area, all without difficulties.  It was through these ports that various  instruments were used to complete the procedure.    The L fallopian tube fimbriated remnant end was seen and I decided to remove it; it was grasped and detached from the mesosalpinx with the harmonic scalpel.  The L round ligament was then divided and a superior pedical was developed and a the pedical made detaching the ovary from the uterus.  The anterior leaf of the broad ligament was then taken down to the area above the bladder.  The posterior leaf was then taken down, the uterine arteries were skeletonized and coagulated with the bipolar bovie.      This was repeated on the R side in an identical fashion without difficulty.      The uterus was then detached from the top of the vagina with the harmonic scalpel and pulled through the vagina to maintain the pneumoperitoneum.  The uterus was handed off to the operating personnel.  Uterine weight pending pathology report.      The cuff was closed with a series of figure of eight 0-vicryl sutures till hemostatic.  The pelvis was irrigated with copious amounts of sterile water till completely clear.  The ureters were reinspected and found to be peristalsing well and were still clear from the area of dissection.  The urine was clear in the centeno bag at the termination of the procedure.  The cuff was reinspected and found to be completely hemostatic. The cuff closure and integrity was checked digitally from below by the assistant.    All instruments were removed and the abdomen was deflated. The fascial defect was closed with 0-vicryl in a simple interrupted fashion, the skin was closed with 4-0 monocryl in a subcuticular fashion.      Pt tolerated procedure well and went to the RR in satisfactory condition.    All sponge, instrument and needle counts were correct x 3 according to the OR personnel.     Elvis Andre MD  11/04/21  09:40 EDT

## 2021-11-04 NOTE — PLAN OF CARE
Goal Outcome Evaluation:           Progress: improving  Outcome Summary: vss, no c/o pain at this time, patient ambulating in hallway, incisions CDI, adequate i/o

## 2021-11-04 NOTE — ANESTHESIA PREPROCEDURE EVALUATION
Anesthesia Evaluation     Patient summary reviewed and Nursing notes reviewed   NPO Solid Status: > 8 hours  NPO Liquid Status: > 2 hours           Airway   Mallampati: II  TM distance: >3 FB  Neck ROM: full  Small opening  Dental      Pulmonary     breath sounds clear to auscultation  (+) asthma ( seasonal, rare inhaler use),  Cardiovascular     ECG reviewed  Rhythm: regular  Rate: normal    (+) CHF ( peripartum, has since recovered with normal LV size and EF of 50%) ,       Neuro/Psych  (+) headaches, psychiatric history Anxiety and Depression,     GI/Hepatic/Renal/Endo    (+)  GERD well controlled,      Musculoskeletal     (+) back pain ( lumbar),   Abdominal    Substance History   (+) alcohol use,   (-) drug use     OB/GYN negative ob/gyn ROS         Other                        Anesthesia Plan    ASA 2     general with block     intravenous induction     Anesthetic plan, all risks, benefits, and alternatives have been provided, discussed and informed consent has been obtained with: patient.  Use of blood products discussed with patient  Consented to blood products.

## 2021-11-04 NOTE — ANESTHESIA POSTPROCEDURE EVALUATION
Patient: Talisha Zepeda    Procedure Summary     Date: 11/04/21 Room / Location:  [facility] OR 3 /  [facility]    Anesthesia Start: 0756 Anesthesia Stop: 0955    Procedure: TOTAL LAPAROSCOPIC HYSTERECTOMY, POSSIBLE TOTAL ABDOMINAL HYSTERECTOMY (N/A Abdomen) Diagnosis:       Menorrhagia with irregular cycle      History of endometrial ablation      (Menorrhagia with irregular cycle [N92.1])      (History of endometrial ablation [Z98.890])    Surgeons: Elvis Andre MD Provider: Navin Gonzalez CRNA    Anesthesia Type: general with block ASA Status: 2          Anesthesia Type: general with block    Vitals  Vitals Value Taken Time   /84 11/04/21 1110   Temp 98 °F (36.7 °C) 11/04/21 0955   Pulse 112 11/04/21 1114   Resp 15 11/04/21 1040   SpO2 99 % 11/04/21 1115   Vitals shown include unvalidated device data.        Post Anesthesia Care and Evaluation    Patient location during evaluation: PACU  Patient participation: complete - patient participated  Level of consciousness: awake and alert  Pain score: 2  Pain management: adequate  Airway patency: patent  Anesthetic complications: No anesthetic complications  PONV Status: none  Cardiovascular status: acceptable  Respiratory status: acceptable  Hydration status: acceptable

## 2021-11-04 NOTE — NURSING NOTE
Spoke with Dr. Andre regarding fluid orders. He stated to keep fluids at 150ml/hr and to D/C 100mL/hr fluid orders.

## 2021-11-04 NOTE — ANESTHESIA PROCEDURE NOTES
Spinal Block    Pre-sedation assessment completed: 11/4/2021 8:03 AM    Patient reassessed immediately prior to procedure    Patient location during procedure: OR  Start Time: 11/4/2021 8:04 AM  Stop Time: 11/4/2021 8:07 AM  Indication:at surgeon's request and post-op pain management  Performed By  CRNA: Navin Gonzalez CRNA  Preanesthetic Checklist  Completed: patient identified, IV checked, site marked, risks and benefits discussed, surgical consent, monitors and equipment checked, pre-op evaluation and timeout performed  Spinal Block Prep:  Patient Position:sitting  Sterile Tech:cap, gloves, mask and sterile barriers  Prep:Chloraprep  Patient Monitoring:blood pressure monitoring, continuous pulse oximetry and EKG  Spinal Block Procedure  Approach:midline  Guidance:landmark technique and palpation technique  Location:L3-L4  Needle Type:Sprotte  Needle Gauge:25 G  Placement of Spinal needle event:cerebrospinal fluid aspirated  Paresthesia: no  Fluid Appearance:clear  Medications: bupivacaine (MARCAINE) injection 0.5%, 1 mL  Med Administered at 11/4/2021 8:07 AM   Post Assessment  Patient Tolerance:patient tolerated the procedure well with no apparent complications  Complications no

## 2021-11-04 NOTE — ANESTHESIA PROCEDURE NOTES
Airway  Urgency: elective    Date/Time: 11/4/2021 8:13 AM  End Time:11/4/2021 8:13 AM  Airway not difficult    General Information and Staff    Patient location during procedure: OR  CRNA: Navin Gonzalez CRNA    Indications and Patient Condition  Indications for airway management: airway protection    Preoxygenated: yes  Mask difficulty assessment: 0 - not attempted    Final Airway Details  Final airway type: endotracheal airway      Successful airway: ETT  Cuffed: yes   Successful intubation technique: direct laryngoscopy  Endotracheal tube insertion site: oral  Blade: Sanabria  Blade size: 2  ETT size (mm): 7.0  Cormack-Lehane Classification: grade I - full view of glottis  Placement verified by: chest auscultation and capnometry   Measured from: lips  ETT/EBT  to lips (cm): 21  Number of attempts at approach: 1  Assessment: lips, teeth, and gum same as pre-op and atraumatic intubation

## 2021-11-05 VITALS
DIASTOLIC BLOOD PRESSURE: 72 MMHG | TEMPERATURE: 98.4 F | OXYGEN SATURATION: 97 % | BODY MASS INDEX: 23.48 KG/M2 | HEART RATE: 87 BPM | WEIGHT: 128.4 LBS | RESPIRATION RATE: 18 BRPM | SYSTOLIC BLOOD PRESSURE: 125 MMHG

## 2021-11-05 LAB
BASOPHILS # BLD AUTO: 0.01 10*3/MM3 (ref 0–0.2)
BASOPHILS NFR BLD AUTO: 0.1 % (ref 0–1.5)
DEPRECATED RDW RBC AUTO: 43.9 FL (ref 37–54)
EOSINOPHIL # BLD AUTO: 0.01 10*3/MM3 (ref 0–0.4)
EOSINOPHIL NFR BLD AUTO: 0.1 % (ref 0.3–6.2)
ERYTHROCYTE [DISTWIDTH] IN BLOOD BY AUTOMATED COUNT: 11.9 % (ref 12.3–15.4)
HCT VFR BLD AUTO: 36.1 % (ref 34–46.6)
HGB BLD-MCNC: 11.8 G/DL (ref 12–15.9)
IMM GRANULOCYTES # BLD AUTO: 0.01 10*3/MM3 (ref 0–0.05)
IMM GRANULOCYTES NFR BLD AUTO: 0.1 % (ref 0–0.5)
LYMPHOCYTES # BLD AUTO: 1.81 10*3/MM3 (ref 0.7–3.1)
LYMPHOCYTES NFR BLD AUTO: 17.2 % (ref 19.6–45.3)
MCH RBC QN AUTO: 32 PG (ref 26.6–33)
MCHC RBC AUTO-ENTMCNC: 32.7 G/DL (ref 31.5–35.7)
MCV RBC AUTO: 97.8 FL (ref 79–97)
MONOCYTES # BLD AUTO: 0.56 10*3/MM3 (ref 0.1–0.9)
MONOCYTES NFR BLD AUTO: 5.3 % (ref 5–12)
NEUTROPHILS NFR BLD AUTO: 77.2 % (ref 42.7–76)
NEUTROPHILS NFR BLD AUTO: 8.12 10*3/MM3 (ref 1.7–7)
PLATELET # BLD AUTO: 250 10*3/MM3 (ref 140–450)
PMV BLD AUTO: 8.9 FL (ref 6–12)
RBC # BLD AUTO: 3.69 10*6/MM3 (ref 3.77–5.28)
WBC # BLD AUTO: 10.52 10*3/MM3 (ref 3.4–10.8)

## 2021-11-05 PROCEDURE — 63710000001 DIPHENHYDRAMINE PER 50 MG: Performed by: NURSE ANESTHETIST, CERTIFIED REGISTERED

## 2021-11-05 PROCEDURE — G0378 HOSPITAL OBSERVATION PER HR: HCPCS

## 2021-11-05 PROCEDURE — 85025 COMPLETE CBC W/AUTO DIFF WBC: CPT | Performed by: OBSTETRICS & GYNECOLOGY

## 2021-11-05 PROCEDURE — 25010000002 KETOROLAC TROMETHAMINE PER 15 MG: Performed by: NURSE ANESTHETIST, CERTIFIED REGISTERED

## 2021-11-05 PROCEDURE — 99024 POSTOP FOLLOW-UP VISIT: CPT | Performed by: OBSTETRICS & GYNECOLOGY

## 2021-11-05 RX ORDER — PSEUDOEPHEDRINE HCL 30 MG
100 TABLET ORAL 2 TIMES DAILY
Qty: 30 CAPSULE | Refills: 1 | Status: SHIPPED | OUTPATIENT
Start: 2021-11-05 | End: 2021-12-06

## 2021-11-05 RX ORDER — OXYCODONE HYDROCHLORIDE AND ACETAMINOPHEN 5; 325 MG/1; MG/1
TABLET ORAL
Qty: 30 TABLET | Refills: 0 | Status: SHIPPED | OUTPATIENT
Start: 2021-11-05 | End: 2021-12-06

## 2021-11-05 RX ORDER — IBUPROFEN 800 MG/1
800 TABLET ORAL EVERY 8 HOURS PRN
Qty: 30 TABLET | Refills: 0 | Status: SHIPPED | OUTPATIENT
Start: 2021-11-05 | End: 2021-12-06

## 2021-11-05 RX ADMIN — OXYCODONE HYDROCHLORIDE AND ACETAMINOPHEN 1 TABLET: 5; 325 TABLET ORAL at 00:11

## 2021-11-05 RX ADMIN — DOCUSATE SODIUM 100 MG: 100 CAPSULE, LIQUID FILLED ORAL at 08:35

## 2021-11-05 RX ADMIN — DIPHENHYDRAMINE HYDROCHLORIDE 25 MG: 25 CAPSULE ORAL at 08:41

## 2021-11-05 RX ADMIN — OXYCODONE HYDROCHLORIDE AND ACETAMINOPHEN 2 TABLET: 5; 325 TABLET ORAL at 08:35

## 2021-11-05 RX ADMIN — KETOROLAC TROMETHAMINE 30 MG: 30 INJECTION, SOLUTION INTRAMUSCULAR; INTRAVENOUS at 06:08

## 2021-11-05 NOTE — PROGRESS NOTES
Patient: Talisha Zepeda    @A@    Anesthesia Type: general with block  Patient location: PACU  Last vitals  BP      Temp      Pulse     Resp      SpO2        Post vital signs: stable  Level of consciousness: awake, alert and oriented    Post-anesthesia pain: adequate analgesia  Airway patency: patent  Respiratory: unassisted  Cardiovascular: stable and blood pressure at baseline  Hydration: euvolemic    Difficult Airway: no  Anesthetic complications: no

## 2021-11-05 NOTE — NURSING NOTE
AVS reviewed with patient. All questions answered and no further concerns at this time. Meds given from pharmacy. Instructed to keep follow up appointment. Left ambulatory in stable condition.

## 2021-11-05 NOTE — PLAN OF CARE
Problem: Adult Inpatient Plan of Care  Goal: Plan of Care Review  Outcome: Met  Goal: Patient-Specific Goal (Individualized)  Outcome: Met  Goal: Absence of Hospital-Acquired Illness or Injury  Outcome: Met  Intervention: Identify and Manage Fall Risk  Recent Flowsheet Documentation  Taken 11/5/2021 0834 by Huma Smallwood RN  Safety Promotion/Fall Prevention:   safety round/check completed   room organization consistent   nonskid shoes/slippers when out of bed   lighting adjusted   clutter free environment maintained   assistive device/personal items within reach  Intervention: Prevent Skin Injury  Recent Flowsheet Documentation  Taken 11/5/2021 0834 by Huma Smallwood RN  Body Position: sitting up in bed  Intervention: Prevent Infection  Recent Flowsheet Documentation  Taken 11/5/2021 0834 by Huma Smallwood RN  Infection Prevention:   visitors restricted/screened   single patient room provided   rest/sleep promoted   personal protective equipment utilized   hand hygiene promoted   equipment surfaces disinfected   environmental surveillance performed  Goal: Optimal Comfort and Wellbeing  Outcome: Met  Intervention: Provide Person-Centered Care  Recent Flowsheet Documentation  Taken 11/5/2021 0834 by Huma Smallwood RN  Trust Relationship/Rapport:   care explained   choices provided   questions answered   questions encouraged   thoughts/feelings acknowledged   reassurance provided  Goal: Readiness for Transition of Care  Outcome: Met  Intervention: Mutually Develop Transition Plan  Recent Flowsheet Documentation  Taken 11/5/2021 1130 by Huma Smallwood RN  Equipment Needed After Discharge: none  Equipment Currently Used at Home: none  Anticipated Changes Related to Illness: none  Transportation Anticipated:   family or friend will provide   car, drives self  Transportation Concerns: car, none  Concerns to be Addressed: no discharge needs identified  Readmission Within the Last 30 Days: no previous  admission in last 30 days  Patient/Family Anticipated Services at Transition: none  Patient/Family Anticipates Transition to: home with family   Goal Outcome Evaluation:

## 2021-11-05 NOTE — DISCHARGE SUMMARY
Date of Admission: 11/4/2021  6:29 AM      Date of Discharge:  11/5/2021    Admitting Service: [facility]    Admission Diagnosis: Menorrhagia. Failed endometrial ablation  Discharge Diagnosis: Menorrhagia. Failed endometrial ablation    Presenting Problem/History of Present Illness  Menorrhagia with irregular cycle [N92.1]  History of endometrial ablation [Z98.890]     Hospital Course  Patient is a 41 y.o. female presented for [facility] due to persistent vaginal bleeding despite endometrial ablation.  Patient has a history of 3 prior C-sections and a bilateral tubal ligation.  She was interested in definitive therapy.  Patient underwent a total laparoscopic hysterectomy without difficulty.  She is postop day #1 today.  She is ambulating without difficulty.  She reports flatus.  She is tolerating a general diet.  She is voiding without difficulty.  Her pain is well controlled with oral pain meds.  Patient is in stable condition and ready for discharge.  Her discharge instructions were reviewed.  Her physical exam is unremarkable.  Patient will follow-up in approximately 2 weeks for continued postoperative care.    Procedures Performed  Procedure(s):  TOTAL LAPAROSCOPIC HYSTERECTOMY, POSSIBLE TOTAL ABDOMINAL HYSTERECTOMY       Consults:   Consults     No orders found for last 30 day(s).            Condition on Discharge:  stable    Vital Signs  Temp:  [97.4 °F (36.3 °C)-98.4 °F (36.9 °C)] 98.4 °F (36.9 °C)  Heart Rate:  [] 87  Resp:  [11-20] 18  BP: (114-141)/(59-86) 125/72    Physical Exam:  General: NAD  Abd: soft, NT/ND. Incisions C/D/I  Ext: no calf tenderness    Discharge Disposition  Home or Self Care    Discharge Medications     Discharge Medications      New Medications      Instructions Start Date   docusate sodium 100 MG capsule   100 mg, Oral, 2 Times Daily      ibuprofen 800 MG tablet  Commonly known as: ADVIL,MOTRIN   800 mg, Oral, Every 8 Hours PRN      oxyCODONE-acetaminophen 5-325 MG per tablet  Commonly  known as: PERCOCET   1-2 tabs po q 4-6hrs prn pain         Continue These Medications      Instructions Start Date   albuterol sulfate  (90 Base) MCG/ACT inhaler  Commonly known as: PROVENTIL HFA;VENTOLIN HFA;PROAIR HFA   2 puffs, Inhalation, Every 4 Hours PRN      traZODone 100 MG tablet  Commonly known as: DESYREL   100 mg, Oral, Nightly PRN             Discharge Diet: regular    Activity at Discharge:   Activity Instructions     Activity as Tolerated      Pelvic Rest            Follow-up Appointments  Future Appointments   Date Time Provider Department Center   11/19/2021 10:45 AM Elvis Andre MD [facility]   12/29/2021 11:45 AM Lit Garcia MD [facility]     Additional Instructions for the Follow-ups that You Need to Schedule     Discharge Follow-up with Specified Provider: Dr Andre; 2 Weeks   As directed      To: Dr Andre    Follow Up: 2 Weeks    Follow Up Details: Call with heavy vaginal bleeding, fever/chills, nausea/vomiting, pain not releieved with pain meds               Test Results Pending at Discharge  Pending Labs     Order Current Status    Tissue Pathology Exam In process           Yumiko Redd DO  11/05/21  11:29 EDT

## 2021-11-07 NOTE — CASE MANAGEMENT/SOCIAL WORK
Case Management Discharge Note      Final Note: dc home         Selected Continued Care - Discharged on 11/5/2021 Admission date: 11/4/2021 - Discharge disposition: Home or Self Care    Destination    No services have been selected for the patient.              Durable Medical Equipment    No services have been selected for the patient.              Dialysis/Infusion    No services have been selected for the patient.              Home Medical Care    No services have been selected for the patient.              Therapy    No services have been selected for the patient.              Community Resources    No services have been selected for the patient.              Community & DME    No services have been selected for the patient.                       Final Discharge Disposition Code: 01 - home or self-care

## 2021-11-08 LAB
LAB AP CASE REPORT: NORMAL
PATH REPORT.FINAL DX SPEC: NORMAL
PATH REPORT.GROSS SPEC: NORMAL

## 2021-11-17 ENCOUNTER — TELEPHONE (OUTPATIENT)
Dept: FAMILY MEDICINE CLINIC | Facility: CLINIC | Age: 41
End: 2021-11-17

## 2021-11-17 RX ORDER — DEXTROAMPHETAMINE SACCHARATE, AMPHETAMINE ASPARTATE MONOHYDRATE, DEXTROAMPHETAMINE SULFATE AND AMPHETAMINE SULFATE 5; 5; 5; 5 MG/1; MG/1; MG/1; MG/1
20 CAPSULE, EXTENDED RELEASE ORAL EVERY MORNING
Qty: 30 CAPSULE | Refills: 0 | Status: SHIPPED | OUTPATIENT
Start: 2021-11-17 | End: 2021-12-16 | Stop reason: SDUPTHER

## 2021-11-17 NOTE — TELEPHONE ENCOUNTER
Caller: Talisha Zepeda    Relationship: Self    Best call back number:[phone]    What medication are you requesting: amphetamine-dextroamphetamine XR (ADDERALL XR) 20 MG 24 hr capsule. WAS NOT ON MED LIST       If a prescription is needed, what is your preferred pharmacy and phone number: [id number] [place], [place] - [address] [facility] [address] [facility] [phone] [facility] [phone]

## 2021-11-19 ENCOUNTER — HOSPITAL ENCOUNTER (EMERGENCY)
Facility: HOSPITAL | Age: 41
Discharge: HOME | End: 2021-11-19
Attending: EMERGENCY MEDICINE | Admitting: EMERGENCY MEDICINE

## 2021-11-19 ENCOUNTER — OFFICE VISIT (OUTPATIENT)
Dept: OBSTETRICS AND GYNECOLOGY | Facility: CLINIC | Age: 41
End: 2021-11-19

## 2021-11-19 VITALS
DIASTOLIC BLOOD PRESSURE: 103 MMHG | OXYGEN SATURATION: 99 % | HEART RATE: 97 BPM | RESPIRATION RATE: 20 BRPM | SYSTOLIC BLOOD PRESSURE: 157 MMHG | BODY MASS INDEX: 22.1 KG/M2 | HEIGHT: 62 IN | TEMPERATURE: 98.1 F | WEIGHT: 120.1 LBS

## 2021-11-19 VITALS
SYSTOLIC BLOOD PRESSURE: 130 MMHG | WEIGHT: 128 LBS | HEIGHT: 62 IN | DIASTOLIC BLOOD PRESSURE: 72 MMHG | BODY MASS INDEX: 23.55 KG/M2

## 2021-11-19 DIAGNOSIS — N99.820 POSTOPERATIVE VAGINAL BLEEDING FOLLOWING GENITOURINARY PROCEDURE: Primary | ICD-10-CM

## 2021-11-19 DIAGNOSIS — Z90.710 S/P LAPAROSCOPIC HYSTERECTOMY: ICD-10-CM

## 2021-11-19 DIAGNOSIS — Z09 POSTOP CHECK: Primary | ICD-10-CM

## 2021-11-19 PROCEDURE — 99283 EMERGENCY DEPT VISIT LOW MDM: CPT

## 2021-11-19 PROCEDURE — 99024 POSTOP FOLLOW-UP VISIT: CPT | Performed by: OBSTETRICS & GYNECOLOGY

## 2021-11-19 PROCEDURE — 99282 EMERGENCY DEPT VISIT SF MDM: CPT | Performed by: EMERGENCY MEDICINE

## 2021-11-19 RX ORDER — PANTOPRAZOLE SODIUM 20 MG/1
20 TABLET, DELAYED RELEASE ORAL 2 TIMES DAILY
COMMUNITY
End: 2022-03-14 | Stop reason: SDUPTHER

## 2021-11-19 NOTE — PROGRESS NOTES
"POSTOP TLH CHECK    S:  Pt had some vaginal bleeding in the middle of the night and went to the ER and was evaluated.  Pt told she had a small pin-hole opening.  Pt denies fever or clots.  Pt states she had intercourse at an undisclosed recent encounter.    O:  /72   Ht 157.5 cm (62\")   Wt 58.1 kg (128 lb)   LMP 10/29/2021 (Approximate) Comment: blee  BMI 23.41 kg/m²     Sterile speculum exam revealed old blood in vault, but cuff intact.  Probed and intact.    A:  Normal postop bleeding s/p TLH.      P:  Path reviewed.  Pelvic rest.  No work for 2 weeks, RTO 2 weeks.  Instructions and precautions given.     Elvis Andre MD  11:17 EST  @today@    "

## 2021-11-19 NOTE — ED TRIAGE NOTES
Patient presents to ED with c/o increased vaginal bleeding with clot passage worsening over the past few days. She reports umbilical pain.    She is s/p lap hyst 11/4. She is alert, oriented, and in no distress. Steady gait. Abd is soft, tender to touch, and round.

## 2021-11-19 NOTE — ED PROVIDER NOTES
EMERGENCY DEPARTMENT ENCOUNTER      Room Number: 08/08      HPI:    Chief complaint: Postoperative bleeding    Location: Per vagina    Quality/Severity: Mild    Timing/Duration: Bleeding started 1 week ago    Modifying Factors: Resting seems to decrease the bleeding and increased activity increases the bleeding    Associated Symptoms: Abdominal wall pain (from surgery)    Narrative: Pt is a 41 y.o. female who presents complaining of postoperative vaginal bleeding as noted above.  The patient had a laparoscopic hysterectomy on November 4 and had a small amount of old blood per vagina for several days.  The bleeding completely abated and the patient engaged in sexual intercourse approximately 1 week after her surgery.  Patient denied any dyspareunia or bleeding after intercourse.  However, within the next day or 2 the patient began to notice some bright red bleeding per vagina.  Patient denied any increased vaginal pain.  The patient states that since then she rested 1 day and the bleeding stopped.  Today the patient spent more time on her feet and the bleeding reoccurred.  Again, the patient states that the blood is bright red and that she did pass several clots today.  Bowel and bladder function normal and the patient has been taking food and liquids without problems.      PMD: Lit Garcia MD    REVIEW OF SYSTEMS  Review of Systems   Constitutional: Negative for activity change, appetite change, fatigue and fever.   HENT: Negative for congestion.    Respiratory: Negative for cough, shortness of breath and wheezing.    Cardiovascular: Negative for chest pain, palpitations and leg swelling.   Gastrointestinal: Positive for abdominal pain (Abdominal wall from surgery). Negative for diarrhea, nausea and vomiting.   Endocrine: Negative for polydipsia.   Genitourinary: Positive for vaginal bleeding. Negative for difficulty urinating, dyspareunia, dysuria, flank pain, frequency, urgency, vaginal discharge and vaginal  pain.   Musculoskeletal: Negative for back pain.   Skin: Negative for rash.   Neurological: Negative for dizziness, weakness and headaches.   Psychiatric/Behavioral: Negative for confusion.   All other systems reviewed and are negative.      PAST MEDICAL HISTORY  Active Ambulatory Problems     Diagnosis Date Noted   • hx Peripartum cardiomyopathy 2018   • Bilateral ovarian cysts 10/10/2018   • Pelvic pain in female 10/10/2018   • H/O bilateral salpingectomy 10/10/2018   • HPV in female 2018   • Menorrhagia with irregular cycle 2019   • History of 3  sections: pt denies any classical sections 2019   • History of endometrial ablation 2021   • S/P laparoscopic hysterectomy: 2021     Resolved Ambulatory Problems     Diagnosis Date Noted   • IUD contraception-Mirena 2018     Past Medical History:   Diagnosis Date   • ADHD    • Anxiety    • Asthma    • Chest pain, midsternal    • Depression    • GERD (gastroesophageal reflux disease)    • History of acquired CHF (congestive heart failure)    • History of gestational hypertension    • History of transfusion    • Hx gestational diabetes    • Low back pain    • Migraines    • Personal history of cardiomyopathy        PAST SURGICAL HISTORY  Past Surgical History:   Procedure Laterality Date   •  SECTION      x3   • CHIN IMPLANT INSERTION      w/jaw surgery   • D & C HYSTEROSCOPY ENDOMETRIAL ABLATION N/A 2019    Procedure: INTRAUTERINE DEVICE REMOVAL  DIAGNOSTIC HYSTEROSCOPY, DILATATION AND CURETTAGE, NOVASURE ENDOMETRIAL ABLATION.;  Surgeon: Elvis Andre MD;  Location: [facility];  Service: Obstetrics/Gynecology   • TOTAL LAPAROSCOPIC HYSTERECTOMY N/A 2021    Procedure: TOTAL LAPAROSCOPIC HYSTERECTOMY, POSSIBLE TOTAL ABDOMINAL HYSTERECTOMY;  Surgeon: Elvis Andre MD;  Location: [facility];  Service: Obstetrics/Gynecology;  Laterality: N/A;   • TUBAL  ABDOMINAL LIGATION         FAMILY HISTORY  Family History   Problem Relation Age of Onset   • Breast cancer Mother    • Kidney failure Mother    • Diabetes Mother    • Heart failure Mother    • Hypertension Mother    • Asthma Father    • Sleep apnea Father    • Heart failure Maternal Grandfather    • Malig Hyperthermia Neg Hx        SOCIAL HISTORY  Social History     Socioeconomic History   • Marital status:    Tobacco Use   • Smoking status: Former Smoker     Years: 15.00     Types: Cigarettes     Quit date:      Years since quittin.8   • Smokeless tobacco: Never Used   Vaping Use   • Vaping Use: Never used   Substance and Sexual Activity   • Alcohol use: Yes     Comment: few times a year   • Drug use: No   • Sexual activity: Yes     Partners: Male     Birth control/protection: None, Surgical       ALLERGIES  Patient has no known allergies.    PHYSICAL EXAM  ED Triage Vitals   Temp Heart Rate Resp BP SpO2   21   98.1 °F (36.7 °C) 97 20 (!) 157/103 99 %      Temp src Heart Rate Source Patient Position BP Location FiO2 (%)   21 --   Oral Monitor Sitting Right arm        Physical Exam  Vitals and nursing note reviewed.   HENT:      Head: Normocephalic and atraumatic.   Eyes:      Extraocular Movements: Extraocular movements intact.      Conjunctiva/sclera: Conjunctivae normal.   Cardiovascular:      Rate and Rhythm: Normal rate and regular rhythm.      Heart sounds: Normal heart sounds. No murmur heard.      Abdominal:      General: Abdomen is flat. Bowel sounds are normal.      Palpations: Abdomen is soft.      Tenderness: There is abdominal tenderness (Mild in the lower abdomen).      Comments: Surgical incisions appear to be healing nicely with some mild residual, localized ecchymosis.   Genitourinary:     Comments: Normal external, female, genitalia without visible blood on the  vulva or perineum.  Vaginal speculum exam did show a scant amount of pinkish discharge.  No active bleeding or visible ras blood.  The vaginal cuff was identified and was completely intact.  There was a 5 mm spot of fresh blood in one area of the cuff that was wiped off and did not return.  Musculoskeletal:      Cervical back: Normal range of motion and neck supple.   Skin:     General: Skin is warm and dry.   Neurological:      General: No focal deficit present.      Mental Status: She is oriented to person, place, and time.   Psychiatric:         Mood and Affect: Mood normal.         Behavior: Behavior normal.         LAB RESULTS        I ordered the above labs and reviewed the results    RADIOLOGY  No results found.    I ordered the above radiologic testing and reviewed the results    PROCEDURES  Procedures      PROGRESS AND CONSULTS  ED Course as of 11/19/21 0304   Fri Nov 19, 2021   0301 It was explained to the patient that her vaginal cuff was intact and that there was no active bleeding identified during the exam.  The patient was specifically informed that she should not place anything in her vagina until specifically cleared by gynecology.  The patient was also advised to avoid prolonged standing/ambulation and also avoid strenuous activities.  The patient was also advised to keep her appointment with Dr. Andre later this morning. [ML]      ED Course User Index  [ML] Jose Antonio Kurtz MD           MEDICAL DECISION MAKING  Results were reviewed/discussed with the patient and they were also made aware of online access. Pt also made aware that some labs, such as cultures, will not be resulted during ER visit and follow up with PMD is necessary.     MDM       DIAGNOSIS  Final diagnoses:   Postoperative vaginal bleeding following genitourinary procedure       Latest Documented Vital Signs:  As of 03:03 EST  BP- (!) 157/103 HR- 97 Temp- 98.1 °F (36.7 °C) (Oral) O2 sat- 99%    DISPOSITION  Discharged in  good condition       Medication List      No changes were made to your prescriptions during this visit.          Follow-up Information     Elvis Andre MD Today.    Specialties: Obstetrics and Gynecology, Gynecology  Why: As scheduled  Contact information:  [address] [place] [address] [phone]                            Jose Antonio Kurtz MD  11/19/21 9092

## 2021-12-06 ENCOUNTER — OFFICE VISIT (OUTPATIENT)
Dept: OBSTETRICS AND GYNECOLOGY | Facility: CLINIC | Age: 41
End: 2021-12-06

## 2021-12-06 VITALS
SYSTOLIC BLOOD PRESSURE: 142 MMHG | WEIGHT: 127.2 LBS | DIASTOLIC BLOOD PRESSURE: 92 MMHG | BODY MASS INDEX: 23.41 KG/M2 | HEIGHT: 62 IN

## 2021-12-06 DIAGNOSIS — Z90.710 S/P LAPAROSCOPIC HYSTERECTOMY: Primary | ICD-10-CM

## 2021-12-06 PROCEDURE — 99024 POSTOP FOLLOW-UP VISIT: CPT | Performed by: OBSTETRICS & GYNECOLOGY

## 2021-12-06 NOTE — PROGRESS NOTES
4 wk postop check      S:  Pt feels ok, enough to go back to work.  Denies any vaginal bleeding.      O:  LMP 10/29/2021 (Approximate) Comment: blee    incs healing well.    A:  Doing well    P:  Pelvic rest a full 6 weeks, return to work with 10#lifting restriction, RTO 2 wks for final postop check.       Elvis Andre MD  12:49 EST  12/06/21

## 2021-12-16 ENCOUNTER — TELEPHONE (OUTPATIENT)
Dept: FAMILY MEDICINE CLINIC | Facility: CLINIC | Age: 41
End: 2021-12-16

## 2021-12-16 NOTE — TELEPHONE ENCOUNTER
Caller: Talisha Zepeda    Relationship: Self    Best call back number: [phone]    Requested Prescriptions:   Requested Prescriptions     Pending Prescriptions Disp Refills   • amphetamine-dextroamphetamine XR (Adderall XR) 20 MG 24 hr capsule 30 capsule 0     Sig: Take 1 capsule by mouth Every Morning        Pharmacy where request [facility] be sent: [id number] [place], [place] - [address] [facility] [address] [facility] [phone] [facility] [phone] FX     Additional details provided by patient: PATIENT HAS ONE LEFT   Does the patient have less than a 3 day supply:  [x] Yes  [] No    Michael Cortes Rep   12/16/21 14:50 EST

## 2021-12-17 RX ORDER — DEXTROAMPHETAMINE SACCHARATE, AMPHETAMINE ASPARTATE MONOHYDRATE, DEXTROAMPHETAMINE SULFATE AND AMPHETAMINE SULFATE 5; 5; 5; 5 MG/1; MG/1; MG/1; MG/1
20 CAPSULE, EXTENDED RELEASE ORAL EVERY MORNING
Qty: 30 CAPSULE | Refills: 0 | Status: SHIPPED | OUTPATIENT
Start: 2021-12-17 | End: 2022-01-14 | Stop reason: SDUPTHER

## 2021-12-20 ENCOUNTER — OFFICE VISIT (OUTPATIENT)
Dept: OBSTETRICS AND GYNECOLOGY | Facility: CLINIC | Age: 41
End: 2021-12-20

## 2021-12-20 VITALS
BODY MASS INDEX: 22.93 KG/M2 | HEIGHT: 62 IN | WEIGHT: 124.6 LBS | DIASTOLIC BLOOD PRESSURE: 88 MMHG | SYSTOLIC BLOOD PRESSURE: 136 MMHG

## 2021-12-20 DIAGNOSIS — Z90.710 S/P LAPAROSCOPIC HYSTERECTOMY: Primary | ICD-10-CM

## 2021-12-20 PROCEDURE — 99024 POSTOP FOLLOW-UP VISIT: CPT | Performed by: OBSTETRICS & GYNECOLOGY

## 2021-12-20 NOTE — PROGRESS NOTES
"FINAL POSTOP CHECK FROM [facility], BS    S:  Doing well, no complaints.     O:  /88   Ht 157.5 cm (62\")   Wt 56.5 kg (124 lb 9.6 oz)   LMP 10/29/2021 (Approximate) Comment: blee  BMI 22.79 kg/m²     incs look good.     A:  Doing well, no complaints.     P:  RTO 1 yr.     Elvis Andre MD  16:50 EST  12/20/21    "

## 2022-01-14 NOTE — TELEPHONE ENCOUNTER
Caller: DuaneLamar mascorroTalisha    Relationship: Self    Best call back number: [phone]    Requested Prescriptions:   Requested Prescriptions     Pending Prescriptions Disp Refills   • amphetamine-dextroamphetamine XR (Adderall XR) 20 MG 24 hr capsule 30 capsule 0     Sig: Take 1 capsule by mouth Every Morning        Pharmacy where request should be sent: [facility] #[id number] [place], [place] - [address] [facility] [address] [facility] [phone] [facility] [phone]      Additional details provided by patient: PATIENT HAS 1 DAY LEFT    Does the patient have less than a 3 day supply:  [x] Yes  [] No    Michael Kelsey Rep   01/14/22 14:09 EST

## 2022-01-16 RX ORDER — DEXTROAMPHETAMINE SACCHARATE, AMPHETAMINE ASPARTATE MONOHYDRATE, DEXTROAMPHETAMINE SULFATE AND AMPHETAMINE SULFATE 5; 5; 5; 5 MG/1; MG/1; MG/1; MG/1
20 CAPSULE, EXTENDED RELEASE ORAL EVERY MORNING
Qty: 30 CAPSULE | Refills: 0 | Status: SHIPPED | OUTPATIENT
Start: 2022-01-16 | End: 2022-02-16 | Stop reason: SDUPTHER

## 2022-02-16 RX ORDER — DEXTROAMPHETAMINE SACCHARATE, AMPHETAMINE ASPARTATE MONOHYDRATE, DEXTROAMPHETAMINE SULFATE AND AMPHETAMINE SULFATE 5; 5; 5; 5 MG/1; MG/1; MG/1; MG/1
20 CAPSULE, EXTENDED RELEASE ORAL EVERY MORNING
Qty: 30 CAPSULE | Refills: 0 | Status: SHIPPED | OUTPATIENT
Start: 2022-02-16 | End: 2022-03-18 | Stop reason: SDUPTHER

## 2022-02-16 NOTE — TELEPHONE ENCOUNTER
Caller: Talisha Zepeda    Relationship: Self    Requested Prescriptions:   Requested Prescriptions     Pending Prescriptions Disp Refills   • amphetamine-dextroamphetamine XR (Adderall XR) 20 MG 24 hr capsule 30 capsule 0     Sig: Take 1 capsule by mouth Every Morning      Pharmacy where request should be sent: [facility] #[id number] [place], [place] - [address] [facility] [address] [facility] [phone] [facility] [phone] FX

## 2022-03-14 RX ORDER — PANTOPRAZOLE SODIUM 20 MG/1
20 TABLET, DELAYED RELEASE ORAL 2 TIMES DAILY
Qty: 60 TABLET | Refills: 1 | Status: SHIPPED | OUTPATIENT
Start: 2022-03-14 | End: 2022-05-18

## 2022-03-14 NOTE — TELEPHONE ENCOUNTER
Caller: Talisha Zepeda    Relationship: Self    Best call back number:[phone] (H)    Requested Prescriptions:   Requested Prescriptions     Pending Prescriptions Disp Refills   • pantoprazole (PROTONIX) 20 MG EC tablet       Sig: Take 1 tablet by mouth 2 (Two) Times a Day.        Pharmacy where request should be sent: [facility] #[id number] [place], [place] - [address] [facility] [address] [facility] [phone] [facility] [phone]      Additional details provided by patient: PATIENT HAS 1 DAY SUPPLY LEFT AND NEEDS REFILLED ASAP    Does the patient have less than a 3 day supply:  [x] Yes  [] No    Michael Bonilla Rep   03/14/22 13:23 EDT

## 2022-03-18 RX ORDER — DEXTROAMPHETAMINE SACCHARATE, AMPHETAMINE ASPARTATE MONOHYDRATE, DEXTROAMPHETAMINE SULFATE AND AMPHETAMINE SULFATE 5; 5; 5; 5 MG/1; MG/1; MG/1; MG/1
20 CAPSULE, EXTENDED RELEASE ORAL EVERY MORNING
Qty: 30 CAPSULE | Refills: 0 | Status: SHIPPED | OUTPATIENT
Start: 2022-03-18 | End: 2022-04-19 | Stop reason: SDUPTHER

## 2022-03-18 NOTE — TELEPHONE ENCOUNTER
Caller: Talisha Zepeda    Relationship: Self    Best call back number:[phone]  Requested Prescriptions:   Requested Prescriptions     Pending Prescriptions Disp Refills   • amphetamine-dextroamphetamine XR (Adderall XR) 20 MG 24 hr capsule 30 capsule 0     Sig: Take 1 capsule by mouth Every Morning        Pharmacy where request should be sent: [facility] #[id number] [place], [place] - [address] [facility] [address] [facility] [phone] [facility] [phone] FX     Additional details provided by patient: OUT    Does the patient have less than a 3 day supply:  [x] Yes  [] No    Valentin Fan   03/18/22 14:02 EDT

## 2022-04-19 NOTE — TELEPHONE ENCOUNTER
Medication requested (name and dose): amphetamine-dextroamphetamine XR (Adderall XR) 20 MG 24 hr capsule    Pharmacy where request should be sent: [facility] #[id number] [place], [place] - [address] [facility] [address] [facility] [phone] [facility] [phone] FX     Additional details provided by patient: PT HAS TODAY LEFT     Best call back number: [phone]    Does the patient have less than a 3 day supply:  [x] Yes  [] No    Shawna Abreu  04/19/22, 10:08 EDT

## 2022-04-20 RX ORDER — DEXTROAMPHETAMINE SACCHARATE, AMPHETAMINE ASPARTATE MONOHYDRATE, DEXTROAMPHETAMINE SULFATE AND AMPHETAMINE SULFATE 5; 5; 5; 5 MG/1; MG/1; MG/1; MG/1
20 CAPSULE, EXTENDED RELEASE ORAL EVERY MORNING
Qty: 30 CAPSULE | Refills: 0 | Status: SHIPPED | OUTPATIENT
Start: 2022-04-20 | End: 2022-05-18 | Stop reason: SDUPTHER

## 2022-04-30 ENCOUNTER — APPOINTMENT (OUTPATIENT)
Dept: GENERAL RADIOLOGY | Facility: HOSPITAL | Age: 42
End: 2022-04-30

## 2022-04-30 ENCOUNTER — HOSPITAL ENCOUNTER (EMERGENCY)
Facility: HOSPITAL | Age: 42
Discharge: HOME | End: 2022-04-30
Attending: EMERGENCY MEDICINE | Admitting: EMERGENCY MEDICINE

## 2022-04-30 VITALS
SYSTOLIC BLOOD PRESSURE: 165 MMHG | HEART RATE: 94 BPM | TEMPERATURE: 98.9 F | DIASTOLIC BLOOD PRESSURE: 114 MMHG | RESPIRATION RATE: 17 BRPM | OXYGEN SATURATION: 98 %

## 2022-04-30 DIAGNOSIS — S86.112A STRAIN OF LEFT SOLEUS MUSCLE, INITIAL ENCOUNTER: Primary | ICD-10-CM

## 2022-04-30 PROCEDURE — 73590 X-RAY EXAM OF LOWER LEG: CPT

## 2022-04-30 PROCEDURE — 99283 EMERGENCY DEPT VISIT LOW MDM: CPT

## 2022-04-30 PROCEDURE — 73560 X-RAY EXAM OF KNEE 1 OR 2: CPT

## 2022-04-30 RX ORDER — IBUPROFEN 800 MG/1
800 TABLET ORAL
Qty: 30 TABLET | Refills: 0 | Status: SHIPPED | OUTPATIENT
Start: 2022-04-30 | End: 2022-06-14

## 2022-04-30 RX ORDER — HYDROCODONE BITARTRATE AND ACETAMINOPHEN 7.5; 325 MG/1; MG/1
1 TABLET ORAL ONCE
Status: COMPLETED | OUTPATIENT
Start: 2022-04-30 | End: 2022-04-30

## 2022-04-30 RX ADMIN — HYDROCODONE BITARTRATE AND ACETAMINOPHEN 1 TABLET: 7.5; 325 TABLET ORAL at 23:06
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
## 2022-05-01 NOTE — DISCHARGE INSTRUCTIONS
Bear weight as tolerated but you can use crutches when needed.  Ice and elevate for 20 to 30 minutes at a time as frequently as you can tolerate it.  Ibuprofen or Tylenol.  You need to call and schedule a follow-up appointment with orthopedics.  You will not be able to bear weight and do your normal duties at work until cleared to do so by an orthopedic doctor.  I cannot predict how long that will be
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
## 2022-05-07 ENCOUNTER — HOSPITAL ENCOUNTER (EMERGENCY)
Facility: HOSPITAL | Age: 42
Discharge: HOME | End: 2022-05-07
Attending: EMERGENCY MEDICINE | Admitting: EMERGENCY MEDICINE

## 2022-05-07 VITALS
HEART RATE: 93 BPM | RESPIRATION RATE: 20 BRPM | OXYGEN SATURATION: 100 % | TEMPERATURE: 96.6 F | DIASTOLIC BLOOD PRESSURE: 91 MMHG | SYSTOLIC BLOOD PRESSURE: 160 MMHG

## 2022-05-07 DIAGNOSIS — I10 HYPERTENSION, UNSPECIFIED TYPE: ICD-10-CM

## 2022-05-07 DIAGNOSIS — R74.8 ALKALINE PHOSPHATASE ELEVATION: ICD-10-CM

## 2022-05-07 DIAGNOSIS — R20.2 PARESTHESIAS: Primary | ICD-10-CM

## 2022-05-07 LAB
ALBUMIN SERPL-MCNC: 4.8 G/DL (ref 3.5–5.2)
ALBUMIN/GLOB SERPL: 1.6 G/DL
ALP SERPL-CCNC: 127 U/L (ref 39–117)
ALT SERPL W P-5'-P-CCNC: 21 U/L (ref 1–33)
ANION GAP SERPL CALCULATED.3IONS-SCNC: 11.6 MMOL/L (ref 5–15)
AST SERPL-CCNC: 21 U/L (ref 1–32)
BASOPHILS # BLD AUTO: 0.03 10*3/MM3 (ref 0–0.2)
BASOPHILS NFR BLD AUTO: 0.3 % (ref 0–1.5)
BILIRUB SERPL-MCNC: 0.6 MG/DL (ref 0–1.2)
BUN SERPL-MCNC: 10 MG/DL (ref 6–20)
BUN/CREAT SERPL: 16.7 (ref 7–25)
CALCIUM SPEC-SCNC: 9.2 MG/DL (ref 8.6–10.5)
CHLORIDE SERPL-SCNC: 107 MMOL/L (ref 98–107)
CO2 SERPL-SCNC: 25.4 MMOL/L (ref 22–29)
CREAT SERPL-MCNC: 0.6 MG/DL (ref 0.57–1)
DEPRECATED RDW RBC AUTO: 42.9 FL (ref 37–54)
EGFRCR SERPLBLD CKD-EPI 2021: 116.5 ML/MIN/1.73
EOSINOPHIL # BLD AUTO: 0.01 10*3/MM3 (ref 0–0.4)
EOSINOPHIL NFR BLD AUTO: 0.1 % (ref 0.3–6.2)
ERYTHROCYTE [DISTWIDTH] IN BLOOD BY AUTOMATED COUNT: 12.5 % (ref 12.3–15.4)
GLOBULIN UR ELPH-MCNC: 3 GM/DL
GLUCOSE SERPL-MCNC: 83 MG/DL (ref 65–99)
HCT VFR BLD AUTO: 43.1 % (ref 34–46.6)
HGB BLD-MCNC: 14.9 G/DL (ref 12–15.9)
IMM GRANULOCYTES # BLD AUTO: 0.02 10*3/MM3 (ref 0–0.05)
IMM GRANULOCYTES NFR BLD AUTO: 0.2 % (ref 0–0.5)
LYMPHOCYTES # BLD AUTO: 1.57 10*3/MM3 (ref 0.7–3.1)
LYMPHOCYTES NFR BLD AUTO: 17.7 % (ref 19.6–45.3)
MCH RBC QN AUTO: 32.1 PG (ref 26.6–33)
MCHC RBC AUTO-ENTMCNC: 34.6 G/DL (ref 31.5–35.7)
MCV RBC AUTO: 92.9 FL (ref 79–97)
MONOCYTES # BLD AUTO: 0.33 10*3/MM3 (ref 0.1–0.9)
MONOCYTES NFR BLD AUTO: 3.7 % (ref 5–12)
NEUTROPHILS NFR BLD AUTO: 6.92 10*3/MM3 (ref 1.7–7)
NEUTROPHILS NFR BLD AUTO: 78 % (ref 42.7–76)
NRBC BLD AUTO-RTO: 0 /100 WBC (ref 0–0.2)
PLATELET # BLD AUTO: 286 10*3/MM3 (ref 140–450)
PMV BLD AUTO: 9.1 FL (ref 6–12)
POTASSIUM SERPL-SCNC: 3.6 MMOL/L (ref 3.5–5.2)
PROT SERPL-MCNC: 7.8 G/DL (ref 6–8.5)
RBC # BLD AUTO: 4.64 10*6/MM3 (ref 3.77–5.28)
SODIUM SERPL-SCNC: 144 MMOL/L (ref 136–145)
WBC NRBC COR # BLD: 8.88 10*3/MM3 (ref 3.4–10.8)

## 2022-05-07 PROCEDURE — 85025 COMPLETE CBC W/AUTO DIFF WBC: CPT | Performed by: EMERGENCY MEDICINE

## 2022-05-07 PROCEDURE — 99283 EMERGENCY DEPT VISIT LOW MDM: CPT

## 2022-05-07 PROCEDURE — 80053 COMPREHEN METABOLIC PANEL: CPT | Performed by: EMERGENCY MEDICINE

## 2022-05-07 RX ORDER — SODIUM CHLORIDE 0.9 % (FLUSH) 0.9 %
10 SYRINGE (ML) INJECTION AS NEEDED
Status: DISCONTINUED | OUTPATIENT
Start: 2022-05-07 | End: 2022-05-07 | Stop reason: HOSPADM

## 2022-05-07 RX ADMIN — SODIUM CHLORIDE, POTASSIUM CHLORIDE, SODIUM LACTATE AND CALCIUM CHLORIDE 1000 ML: 600; 310; 30; 20 INJECTION, SOLUTION INTRAVENOUS at 15:58

## 2022-05-11 ENCOUNTER — OFFICE VISIT (OUTPATIENT)
Dept: FAMILY MEDICINE CLINIC | Facility: CLINIC | Age: 42
End: 2022-05-11

## 2022-05-11 VITALS
BODY MASS INDEX: 21.71 KG/M2 | HEIGHT: 62 IN | HEART RATE: 80 BPM | SYSTOLIC BLOOD PRESSURE: 146 MMHG | DIASTOLIC BLOOD PRESSURE: 92 MMHG | WEIGHT: 118 LBS | RESPIRATION RATE: 18 BRPM | TEMPERATURE: 97.7 F | OXYGEN SATURATION: 99 %

## 2022-05-11 DIAGNOSIS — Z51.81 MEDICATION MONITORING ENCOUNTER: ICD-10-CM

## 2022-05-11 DIAGNOSIS — R53.83 FATIGUE, UNSPECIFIED TYPE: ICD-10-CM

## 2022-05-11 DIAGNOSIS — Z82.49 FAMILY HISTORY OF EARLY CAD: ICD-10-CM

## 2022-05-11 DIAGNOSIS — I10 HYPERTENSION, UNSPECIFIED TYPE: Primary | ICD-10-CM

## 2022-05-11 PROCEDURE — 99214 OFFICE O/P EST MOD 30 MIN: CPT | Performed by: NURSE PRACTITIONER

## 2022-05-11 RX ORDER — LISINOPRIL 5 MG/1
5 TABLET ORAL DAILY
Qty: 30 TABLET | Refills: 0 | Status: SHIPPED | OUTPATIENT
Start: 2022-05-11 | End: 2022-06-13

## 2022-05-11 NOTE — ASSESSMENT & PLAN NOTE
Pressure elevated today 146/92.  Advised patient to monitor blood pressure twice daily return to office within 2 weeks.  Started lisinopril 5 mg advised patient to check blood pressure before taking blood pressure medicine.  Go to ER if bp uncontrolled

## 2022-05-11 NOTE — PROGRESS NOTES
"Chief Complaint  follow up ER visit on Saturday (Had nausea and dizziness told b/p was elevated/they said she may need to try different med for ADD) and elevated alk phos (Told this was elevated in ER)    Subjective          Talisha Zepeda presents to [facility]  Patient presents to the office today for a follow-up on an ER visit.  She was found to have nausea dizziness a and elevated blood pressure of 169/118.  She denies chest pain shortness of air at the time.  Patient has history of ADHD does not follow psychiatry.  She is currently taking Adderall XR 20 mg daily.  She is wanting to change in her ADHD medication.  I explained to patient that she would have to make an appointment with one of the physicians to change controlled substance for ADHD.  She verbalized understanding.  Blood pressure today is 146/92.  I would like to start her on a low-dose lisinopril advised to check blood pressure regularly and return to office for a follow-up visit within 2 weeks.                Objective   Vital Signs:  /92 (BP Location: Left arm, Patient Position: Sitting)   Pulse 80   Temp 97.7 °F (36.5 °C) (Infrared)   Resp 18   Ht 157.5 cm (62\")   Wt 53.5 kg (118 lb)   SpO2 99%   BMI 21.58 kg/m²     BMI is within normal parameters. No follow-up required.      Physical Exam  Constitutional:       General: She is not in acute distress.     Appearance: Normal appearance.   Eyes:      Pupils: Pupils are equal, round, and reactive to light.   Cardiovascular:      Rate and Rhythm: Normal rate and regular rhythm.      Pulses: Normal pulses.      Heart sounds: Normal heart sounds.   Pulmonary:      Effort: Pulmonary effort is normal.      Breath sounds: Normal breath sounds. No wheezing or rales.   Neurological:      Mental Status: She is alert.   Psychiatric:         Mood and Affect: Mood normal.         Behavior: Behavior normal.        Result Review :   The following data was reviewed by: " Mana Resendiz, APRN on 05/11/2022:  Common labs    Common Labsle 11/2/21 11/5/21 5/7/22 5/7/22      1537 1537   Glucose    83   BUN    10   Creatinine    0.60   Sodium    144   Potassium    3.6   Chloride    107   Calcium    9.2   Albumin    4.80   Total Bilirubin    0.6   Alkaline Phosphatase    127 (A)   AST (SGOT)    21   ALT (SGPT)    21   WBC 6.26 10.52 8.88    Hemoglobin 14.0 11.8 (A) 14.9    Hematocrit 42.1 36.1 43.1    Platelets 311 250 286    (A) Abnormal value                     Assessment and Plan    Diagnoses and all orders for this visit:    1. Hypertension, unspecified type (Primary)  Assessment & Plan:  Pressure elevated today 146/92.  Advised patient to monitor blood pressure twice daily return to office within 2 weeks.  Started lisinopril 5 mg advised patient to check blood pressure before taking blood pressure medicine.  Go to ER if bp uncontrolled       Orders:  -     lisinopril (PRINIVIL,ZESTRIL) 5 MG tablet; Take 1 tablet by mouth Daily.  Dispense: 30 tablet; Refill: 0    2. Medication monitoring encounter  -     Compliance Drug Analysis, Ur - Urine, Clean Catch    3. Fatigue, unspecified type  -     TSH    4. Family history of early CAD  -     Lipid panel           Follow Up   No follow-ups on file.  Patient was given instructions and counseling regarding her condition or for health maintenance advice. Please see specific information pulled into the AVS if appropriate.

## 2022-05-18 RX ORDER — DEXTROAMPHETAMINE SACCHARATE, AMPHETAMINE ASPARTATE MONOHYDRATE, DEXTROAMPHETAMINE SULFATE AND AMPHETAMINE SULFATE 5; 5; 5; 5 MG/1; MG/1; MG/1; MG/1
20 CAPSULE, EXTENDED RELEASE ORAL EVERY MORNING
Qty: 30 CAPSULE | Refills: 0 | Status: SHIPPED | OUTPATIENT
Start: 2022-05-18 | End: 2022-06-20 | Stop reason: SDUPTHER

## 2022-05-18 RX ORDER — PANTOPRAZOLE SODIUM 20 MG/1
TABLET, DELAYED RELEASE ORAL
Qty: 60 TABLET | Refills: 1 | Status: SHIPPED | OUTPATIENT
Start: 2022-05-18 | End: 2022-06-20

## 2022-05-18 NOTE — TELEPHONE ENCOUNTER
Caller: Talisha Zepeda    Relationship: Self    Best call back number: [phone]    Requested Prescriptions:   Requested Prescriptions     Pending Prescriptions Disp Refills   • amphetamine-dextroamphetamine XR (Adderall XR) 20 MG 24 hr capsule 30 capsule 0     Sig: Take 1 capsule by mouth Every Morning        Pharmacy where request should be sent: [id number] [place], [place] - [address] [facility] [address] [phone] [facility] [phone] FX     Additional details provided by patient: PATIENT IS OUT    Does the patient have less than a 3 day supply:  [x] Yes  [] No    Michael Weiss Rep   05/18/22 12:41 EDT

## 2022-05-31 PROCEDURE — 99282 EMERGENCY DEPT VISIT SF MDM: CPT

## 2022-06-01 ENCOUNTER — HOSPITAL ENCOUNTER (EMERGENCY)
Facility: HOSPITAL | Age: 42
Discharge: HOME | End: 2022-06-01
Attending: EMERGENCY MEDICINE | Admitting: EMERGENCY MEDICINE

## 2022-06-01 VITALS
RESPIRATION RATE: 18 BRPM | HEART RATE: 100 BPM | BODY MASS INDEX: 23.37 KG/M2 | OXYGEN SATURATION: 99 % | DIASTOLIC BLOOD PRESSURE: 97 MMHG | WEIGHT: 127 LBS | TEMPERATURE: 98.5 F | SYSTOLIC BLOOD PRESSURE: 140 MMHG | HEIGHT: 62 IN

## 2022-06-01 DIAGNOSIS — S61.211A LACERATION OF LEFT INDEX FINGER WITHOUT FOREIGN BODY WITHOUT DAMAGE TO NAIL, INITIAL ENCOUNTER: Primary | ICD-10-CM

## 2022-06-01 RX ORDER — CEPHALEXIN 500 MG/1
500 CAPSULE ORAL 4 TIMES DAILY
Qty: 12 CAPSULE | Refills: 0 | Status: SHIPPED | OUTPATIENT
Start: 2022-06-01 | End: 2022-06-01 | Stop reason: SDUPTHER

## 2022-06-01 RX ORDER — CEPHALEXIN 500 MG/1
500 CAPSULE ORAL 4 TIMES DAILY
Qty: 12 CAPSULE | Refills: 0 | Status: SHIPPED | OUTPATIENT
Start: 2022-06-01 | End: 2022-06-04

## 2022-06-01 NOTE — ED NOTES
MD Kumar transferring discharge medications to [facility] pharmacy of patient's choice r/t workers comp coverage

## 2022-06-01 NOTE — DISCHARGE INSTRUCTIONS
Leave the bandage in the splint on for 5 days.  Keep the splint dry.  After 5 days you can remove the splint.  The skin glue will fall off on its own.  Do not pick at it.  Take the antibiotics as prescribed until they are gone.  Return here immediately for any redness, drainage, or fevers.

## 2022-06-01 NOTE — ED PROVIDER NOTES
EMERGENCY DEPARTMENT ENCOUNTER    Room Number:    Date of encounter:  2022  PCP: Lit Garcia MD  Patient Care Team:  Lit Garcia MD as PCP - General (Family Medicine)   Historian: Patient, family    HPI:  Chief Complaint: Finger laceration  A complete HPI/ROS/PMH/PSH/SH/FH are unobtainable due to: Nothing    Context: Talisha Zepeda is a 41 y.o. female who presents to the ED c/o left second digit finger laceration.  She reports she was at work when she excellently cut her left second digit with a .  She denies other injury.  Her last tetanus shot was 4 years ago.  She denies any weakness or numbness.  She denies any intentional self-harm.  She reports she had persistent bleeding at the onset but it has stopped since arrival.    Prior record review: ER visit 2022 for muscle spasms.    PAST MEDICAL HISTORY  Active Ambulatory Problems     Diagnosis Date Noted   • hx Peripartum cardiomyopathy 2018   • Bilateral ovarian cysts 10/10/2018   • Pelvic pain in female 10/10/2018   • H/O bilateral salpingectomy 10/10/2018   • HPV in female 2018   • Menorrhagia with irregular cycle 2019   • History of 3  sections: pt denies any classical sections 2019   • History of endometrial ablation 2021   • S/P laparoscopic hysterectomy: 2021   • Fatigue 2022   • Medication monitoring encounter 2022   • Family history of early CAD 2022   • Hypertension 2022     Resolved Ambulatory Problems     Diagnosis Date Noted   • IUD contraception-Mirena 2018     Past Medical History:   Diagnosis Date   • ADHD    • Anxiety    • Asthma    • Chest pain, midsternal    • Depression    • GERD (gastroesophageal reflux disease)    • History of acquired CHF (congestive heart failure)    • History of gestational hypertension    • History of transfusion    • Hx gestational diabetes    • Low back pain    • Migraines    • Personal history of  cardiomyopathy        The patient has a COVID HM Topic on their chart, and they are fully vaccinated.    PAST SURGICAL HISTORY  Past Surgical History:   Procedure Laterality Date   •  SECTION      x3   • CHIN IMPLANT INSERTION      w/jaw surgery   • D & C HYSTEROSCOPY ENDOMETRIAL ABLATION N/A 2019    Procedure: INTRAUTERINE DEVICE REMOVAL  DIAGNOSTIC HYSTEROSCOPY, DILATATION AND CURETTAGE, NOVASURE ENDOMETRIAL ABLATION.;  Surgeon: Elvis Andre MD;  Location:  [facility] OR;  Service: Obstetrics/Gynecology   • TOTAL LAPAROSCOPIC HYSTERECTOMY N/A 2021    Procedure: TOTAL LAPAROSCOPIC HYSTERECTOMY, POSSIBLE TOTAL ABDOMINAL HYSTERECTOMY;  Surgeon: Elvis Andre MD;  Location: [facility] OR;  Service: Obstetrics/Gynecology;  Laterality: N/A;   • TUBAL ABDOMINAL LIGATION           FAMILY HISTORY  Family History   Problem Relation Age of Onset   • Breast cancer Mother    • Kidney failure Mother    • Diabetes Mother    • Heart failure Mother    • Hypertension Mother    • Anxiety disorder Mother    • Cancer Mother    • Depression Mother    • Kidney disease Mother    • Asthma Father    • Sleep apnea Father    • Alcohol abuse Father    • Arthritis Father    • Heart failure Maternal Grandfather    • Malig Hyperthermia Neg Hx          SOCIAL HISTORY  Social History     Socioeconomic History   • Marital status: Single   Tobacco Use   • Smoking status: Former Smoker     Years: 15.00     Types: Cigarettes     Quit date: 2015     Years since quittin.4   • Smokeless tobacco: Never Used   Vaping Use   • Vaping Use: Never used   Substance and Sexual Activity   • Alcohol use: Yes     Comment: few times a year   • Drug use: No   • Sexual activity: Yes     Partners: Male     Birth control/protection: Surgical, None         ALLERGIES  Patient has no known allergies.        REVIEW OF SYSTEMS  Review of Systems   No chest pain, no shortness of breath, no nausea, no vomiting, no fever, no  chills, no focal weakness, no focal numbness  All systems reviewed and negative except for those discussed in HPI.       PHYSICAL EXAM    I have reviewed the triage vital signs and nursing notes.    ED Triage Vitals [05/31/22 2325]   Temp Heart Rate Resp BP SpO2   98.5 °F (36.9 °C) 103 18 145/100 98 %      Temp src Heart Rate Source Patient Position BP Location FiO2 (%)   Tympanic Monitor -- -- --       Physical Exam  GENERAL: Awake, alert, no acute distress  SKIN: Warm, dry  HENT: Normocephalic, atraumatic  EYES: no scleral icterus  CV: regular rhythm, regular rate  RESPIRATORY: normal effort, lungs clear  ABDOMEN: soft, nontender, nondistended  MUSCULOSKELETAL: no deformity.  Left second digit with linear laceration on the distal lateral aspect.  Sensation, motor, cap refill intact.  Flexion and extension strength intact.  The laceration is closed and the edges are adhered firmly.  There is no active bleeding.  There is no sign of foreign body or contamination.  NEURO: alert, moves all extremities, follows commands          LAB RESULTS  No results found for this or any previous visit (from the past 24 hour(s)).    Ordered the above labs and independently reviewed the results.        RADIOLOGY  No Radiology Exams Resulted Within Past 24 Hours    I ordered the above noted radiological studies. Reviewed by me and discussed with radiologist.  See dictation for official radiology interpretation.      PROCEDURES    Laceration Repair    Date/Time: 6/1/2022 1:46 AM  Performed by: Kirk Diggs MD  Authorized by: Kirk Diggs MD     Consent:     Consent obtained:  Verbal    Consent given by:  Patient  Anesthesia:     Anesthesia method:  None  Treatment:     Amount of cleaning:  Standard    Irrigation solution:  Sterile saline  Skin repair:     Repair method:  Tissue adhesive  Approximation:     Approximation:  Close  Repair type:     Repair type:  Simple  Post-procedure details:     Dressing:  Splint for protection  and sterile dressing    Procedure completion:  Tolerated well, no immediate complications          MEDICATIONS GIVEN IN ER    Medications - No data to display      PROGRESS, DATA ANALYSIS, CONSULTS, AND MEDICAL DECISION MAKING    All labs have been independently reviewed by me.  All radiology studies have been reviewed by me and discussed with radiologist dictating the report.   EKG's independently viewed and interpreted by me.  Discussion below represents my analysis of pertinent findings related to patient's condition, differential diagnosis, treatment plan and final disposition.    Differential diagnosis includes but is not limited to finger laceration, tendon laceration, nerve laceration.    ED Course as of 06/01/22 0147 Wed Jun 01, 2022 0146 Plan to place her on prophylactic antibiotics for several days given that this was a hand wound and she is at risk of infection. [TR]      ED Course User Index  [TR] Kirk Diggs MD           PPE: The patient wore a mask and I wore an N95 mask throughout the entire patient encounter.       AS OF 01:47 EDT VITALS:    BP - 145/100  HR - 103  TEMP - 98.5 °F (36.9 °C) (Tympanic)  O2 SATS - 98%        DIAGNOSIS  Final diagnoses:   Laceration of left index finger without foreign body without damage to nail, initial encounter         DISPOSITION  ED Disposition     ED Disposition   Discharge    Condition   Stable    Comment   --              Addendum:  Laceration length 0.5 cm     Kirk Diggs MD  06/01/22 0147       Kirk Diggs MD  06/11/22 2023

## 2022-06-11 DIAGNOSIS — I10 HYPERTENSION, UNSPECIFIED TYPE: ICD-10-CM

## 2022-06-13 RX ORDER — LISINOPRIL 5 MG/1
5 TABLET ORAL DAILY
Qty: 30 TABLET | Refills: 0 | Status: SHIPPED | OUTPATIENT
Start: 2022-06-13 | End: 2022-06-14 | Stop reason: ALTCHOICE

## 2022-06-14 ENCOUNTER — OFFICE VISIT (OUTPATIENT)
Dept: FAMILY MEDICINE CLINIC | Facility: CLINIC | Age: 42
End: 2022-06-14

## 2022-06-14 VITALS
RESPIRATION RATE: 14 BRPM | SYSTOLIC BLOOD PRESSURE: 150 MMHG | BODY MASS INDEX: 21.35 KG/M2 | DIASTOLIC BLOOD PRESSURE: 100 MMHG | HEIGHT: 62 IN | HEART RATE: 89 BPM | OXYGEN SATURATION: 99 % | WEIGHT: 116 LBS | TEMPERATURE: 97.5 F

## 2022-06-14 DIAGNOSIS — F90.0 ATTENTION DEFICIT HYPERACTIVITY DISORDER (ADHD), PREDOMINANTLY INATTENTIVE TYPE: Primary | ICD-10-CM

## 2022-06-14 DIAGNOSIS — I10 HYPERTENSION, UNSPECIFIED TYPE: ICD-10-CM

## 2022-06-14 DIAGNOSIS — I50.42 CHRONIC COMBINED SYSTOLIC AND DIASTOLIC CONGESTIVE HEART FAILURE: ICD-10-CM

## 2022-06-14 PROBLEM — F90.9 ADHD: Status: ACTIVE | Noted: 2022-06-14

## 2022-06-14 PROCEDURE — 99214 OFFICE O/P EST MOD 30 MIN: CPT | Performed by: INTERNAL MEDICINE

## 2022-06-14 RX ORDER — LISINOPRIL 20 MG/1
20 TABLET ORAL DAILY
Qty: 30 TABLET | Refills: 3 | Status: SHIPPED | OUTPATIENT
Start: 2022-06-14 | End: 2022-10-03

## 2022-06-14 NOTE — PROGRESS NOTES
Subjective   Talisha Zepeda is a 41 y.o. female.     Vitals:    06/14/22 1750   BP: 150/100   Pulse: 89   Resp: 14   Temp: 97.5 °F (36.4 °C)   SpO2: 99%      Body mass index is 21.21 kg/m².     History of Present Illness   Patient was seen for ADHD.  Patient is taking Adderall XR 20 mg daily.  Patient's blood pressure has steadily increased and is now 150/100.  Patient has a history of postpartum congestive heart failure.  Patient ejection fraction at time was 20 to 30%.  This was 5 years ago.  Patient does see a cardiologist and had an echo 6 months ago.  Echo report cannot be found in epic.  [facility] will be called for results.  Patient was placed on lisinopril 20 mg daily and will monitor blood pressure and follow-up in 1 month.  Patient is also being referred to psychiatry for the ADHD treatment.  Patient is also following up with labs.  Patient was also informed that ibuprofen 80 mg could raise her blood pressure along with trazodone.  Patient states she has stopped both of them and has not been taking them.    Dictated utilizing Dragon dictation. If there are questions or for further clarification, please contact me.  The following portions of the patient's history were reviewed and updated as appropriate: allergies, current medications, past family history, past medical history, past social history, past surgical history and problem list.    Review of Systems   Constitutional: Negative for fatigue and fever.   HENT: Positive for congestion. Negative for trouble swallowing.    Eyes: Negative for discharge and visual disturbance.   Respiratory: Negative for choking and shortness of breath.    Cardiovascular: Negative for chest pain and palpitations.   Gastrointestinal: Negative for abdominal pain and blood in stool.   Endocrine: Negative.    Genitourinary: Negative for genital sores and hematuria.   Musculoskeletal: Negative for gait problem and joint swelling.   Skin: Negative for color change, pallor, rash  and wound.   Allergic/Immunologic: Positive for environmental allergies. Negative for immunocompromised state.   Neurological: Negative for facial asymmetry and speech difficulty.   Psychiatric/Behavioral: Positive for decreased concentration. Negative for hallucinations and suicidal ideas. The patient is nervous/anxious.        Objective   Physical Exam  Vitals and nursing note reviewed.   Constitutional:       Appearance: Normal appearance. She is well-developed.   HENT:      Head: Normocephalic and atraumatic.      Nose: Nose normal.      Mouth/Throat:      Mouth: Mucous membranes are moist.      Pharynx: Oropharynx is clear.   Eyes:      Extraocular Movements: Extraocular movements intact.      Conjunctiva/sclera: Conjunctivae normal.      Pupils: Pupils are equal, round, and reactive to light.   Cardiovascular:      Rate and Rhythm: Normal rate and regular rhythm.      Heart sounds: Normal heart sounds. No murmur heard.    No friction rub. No gallop.   Pulmonary:      Effort: Pulmonary effort is normal. No respiratory distress.      Breath sounds: Normal breath sounds. No stridor. No wheezing, rhonchi or rales.   Chest:      Chest wall: No tenderness.   Abdominal:      General: Bowel sounds are normal.      Palpations: Abdomen is soft.   Musculoskeletal:         General: Normal range of motion.      Cervical back: Normal range of motion and neck supple.   Skin:     General: Skin is warm and dry.   Neurological:      General: No focal deficit present.      Mental Status: She is alert and oriented to person, place, and time. Mental status is at baseline.   Psychiatric:         Behavior: Behavior normal.         Thought Content: Thought content normal.         Judgment: Judgment normal.      Comments: Patient with ADHD         Assessment & Plan #1 psychiatric consult for ADHD #2 lisinopril 20 mg daily #3 echo report from [facility] #4 labs #5 monitor blood pressure follow-up in 1 month  Problems Addressed this Visit         Cardiac and Vasculature    Hypertension    Relevant Medications    lisinopril (PRINIVIL,ZESTRIL) 20 MG tablet    Other Relevant Orders    CBC (No Diff)    Comprehensive Metabolic Panel    Lipid Panel    proBNP    Vitamin D 25 Hydroxy       Mental Health    ADHD - Primary      Other Visit Diagnoses     Chronic combined systolic and diastolic congestive heart failure (HCC)        Relevant Orders    CBC (No Diff)    Comprehensive Metabolic Panel    Lipid Panel    proBNP    Vitamin D 25 Hydroxy      Diagnoses     Diagnosis Codes Comments    Attention deficit hyperactivity disorder (ADHD), predominantly inattentive type    -  Primary ICD-10-CM: F90.0  ICD-9-CM: 314.00     Hypertension, unspecified type     ICD-10-CM: I10  ICD-9-CM: 401.9     Chronic combined systolic and diastolic congestive heart failure (HCC)     ICD-10-CM: I50.42  ICD-9-CM: 428.42, 428.0

## 2022-06-15 ENCOUNTER — TELEPHONE (OUTPATIENT)
Dept: FAMILY MEDICINE CLINIC | Facility: CLINIC | Age: 42
End: 2022-06-15

## 2022-06-15 ENCOUNTER — LAB (OUTPATIENT)
Dept: FAMILY MEDICINE CLINIC | Facility: CLINIC | Age: 42
End: 2022-06-15

## 2022-06-15 DIAGNOSIS — I50.42 CHRONIC COMBINED SYSTOLIC AND DIASTOLIC CONGESTIVE HEART FAILURE: Primary | ICD-10-CM

## 2022-06-15 DIAGNOSIS — I50.42 CHRONIC COMBINED SYSTOLIC AND DIASTOLIC CONGESTIVE HEART FAILURE: ICD-10-CM

## 2022-06-15 DIAGNOSIS — I10 HYPERTENSION, UNSPECIFIED TYPE: ICD-10-CM

## 2022-06-15 LAB
25(OH)D3 SERPL-MCNC: 30.4 NG/ML (ref 30–100)
ALBUMIN SERPL-MCNC: 4.3 G/DL (ref 3.5–5.2)
ALBUMIN/GLOB SERPL: 2 G/DL
ALP SERPL-CCNC: 108 U/L (ref 39–117)
ALT SERPL W P-5'-P-CCNC: 18 U/L (ref 1–33)
ANION GAP SERPL CALCULATED.3IONS-SCNC: 9 MMOL/L (ref 5–15)
AST SERPL-CCNC: 20 U/L (ref 1–32)
BILIRUB SERPL-MCNC: 0.5 MG/DL (ref 0–1.2)
BUN SERPL-MCNC: 6 MG/DL (ref 6–20)
BUN/CREAT SERPL: 13 (ref 7–25)
CALCIUM SPEC-SCNC: 8.8 MG/DL (ref 8.6–10.5)
CHLORIDE SERPL-SCNC: 104 MMOL/L (ref 98–107)
CHOLEST SERPL-MCNC: 171 MG/DL (ref 0–200)
CO2 SERPL-SCNC: 27 MMOL/L (ref 22–29)
CREAT SERPL-MCNC: 0.46 MG/DL (ref 0.57–1)
DEPRECATED RDW RBC AUTO: 39.7 FL (ref 37–54)
EGFRCR SERPLBLD CKD-EPI 2021: 124.2 ML/MIN/1.73
ERYTHROCYTE [DISTWIDTH] IN BLOOD BY AUTOMATED COUNT: 11.8 % (ref 12.3–15.4)
GLOBULIN UR ELPH-MCNC: 2.1 GM/DL
GLUCOSE SERPL-MCNC: 77 MG/DL (ref 65–99)
HCT VFR BLD AUTO: 39.9 % (ref 34–46.6)
HDLC SERPL-MCNC: 56 MG/DL (ref 40–60)
HGB BLD-MCNC: 13.6 G/DL (ref 12–15.9)
LDLC SERPL CALC-MCNC: 103 MG/DL (ref 0–100)
LDLC/HDLC SERPL: 1.83 {RATIO}
MCH RBC QN AUTO: 31.9 PG (ref 26.6–33)
MCHC RBC AUTO-ENTMCNC: 34.1 G/DL (ref 31.5–35.7)
MCV RBC AUTO: 93.4 FL (ref 79–97)
NT-PROBNP SERPL-MCNC: 71.2 PG/ML (ref 0–450)
PLATELET # BLD AUTO: 295 10*3/MM3 (ref 140–450)
PMV BLD AUTO: 9.7 FL (ref 6–12)
POTASSIUM SERPL-SCNC: 3.6 MMOL/L (ref 3.5–5.2)
PROT SERPL-MCNC: 6.4 G/DL (ref 6–8.5)
RBC # BLD AUTO: 4.27 10*6/MM3 (ref 3.77–5.28)
SODIUM SERPL-SCNC: 140 MMOL/L (ref 136–145)
TRIGL SERPL-MCNC: 62 MG/DL (ref 0–150)
TSH SERPL DL<=0.05 MIU/L-ACNC: 1.08 UIU/ML (ref 0.27–4.2)
VLDLC SERPL-MCNC: 12 MG/DL (ref 5–40)
WBC NRBC COR # BLD: 6.86 10*3/MM3 (ref 3.4–10.8)

## 2022-06-15 PROCEDURE — 83880 ASSAY OF NATRIURETIC PEPTIDE: CPT | Performed by: INTERNAL MEDICINE

## 2022-06-15 PROCEDURE — 82306 VITAMIN D 25 HYDROXY: CPT | Performed by: INTERNAL MEDICINE

## 2022-06-15 PROCEDURE — 80061 LIPID PANEL: CPT | Performed by: INTERNAL MEDICINE

## 2022-06-15 PROCEDURE — 85027 COMPLETE CBC AUTOMATED: CPT | Performed by: INTERNAL MEDICINE

## 2022-06-15 PROCEDURE — 80053 COMPREHEN METABOLIC PANEL: CPT | Performed by: INTERNAL MEDICINE

## 2022-06-15 PROCEDURE — 84443 ASSAY THYROID STIM HORMONE: CPT | Performed by: INTERNAL MEDICINE

## 2022-06-15 PROCEDURE — 36415 COLL VENOUS BLD VENIPUNCTURE: CPT | Performed by: INTERNAL MEDICINE

## 2022-06-15 NOTE — TELEPHONE ENCOUNTER
It should be less than 140/90, needs to bring the wrist cuff and and have it checked for accuracy.  Blood pressure checks are free.

## 2022-06-15 NOTE — TELEPHONE ENCOUNTER
Caller: Talisha Zepeda    Relationship: Self    Best call back number: [phone]    What orders are you requesting (i.e. lab or imaging): CARDIAC ECHO    In what timeframe would the patient need to come in: AS SOON AS POSSIBLE    Where will you receive your lab/imaging services: [facility] CARDIOLOGY    Additional notes: PATIENT STATES THAT SHE CALLED TO SCHEDULE AN APPOINTMENT WITH DR. BARTLETT TO HAVE AN ECHO. SHE IS SCHEDULED FOR 07-06-22. WHEN SHE CALLED, THEY TOLD HER THAT IF DR. ALVARADO SENT ORDERS TO THEM THAT THEY WOULD BE ABLE TO GET HER IN SOONER THAN THEN. PATIENT IS ALSO CONCERNED ABOUT HER NEW MEDICATION FOR HER BLOOD PRESSURE. PATIENT IS ASKING HOW LONG IT SHOULD TAKE FOR HER MEDICATION TO WORK. PLEASE ADVISE.

## 2022-06-15 NOTE — TELEPHONE ENCOUNTER
PT ASKED WHAT HER BLOOD PRESSURE GOAL/RANGE SHOULD BE? AND STATED SHE CURRENTLY HAS A WRIST BP CUFF, WANTS TO KNOW IF THAT IS ACCURATE OR IF DR. ALVARADO RECOMMENDS A DIFFERENT TYPE?

## 2022-06-15 NOTE — TELEPHONE ENCOUNTER
PATIENT STATES THAT SHE CALLED TO SCHEDULE AN APPOINTMENT WITH DR. BARTLETT TO HAVE AN ECHO. SHE IS SCHEDULED FOR 07-06-22. WHEN SHE CALLED, THEY TOLD HER THAT IF DR. ALVARADO SENT ORDERS TO THEM THAT THEY WOULD BE ABLE TO GET HER IN SOONER THAN THEN. PATIENT IS ALSO CONCERNED ABOUT HER NEW MEDICATION FOR HER BLOOD PRESSURE. PATIENT IS ASKING HOW LONG IT SHOULD TAKE FOR HER MEDICATION TO WORK. PLEASE ADVISE.

## 2022-06-15 NOTE — TELEPHONE ENCOUNTER
Called LM to return call about scheduling an echo test per Dr. Taylor she needs to call an schedule an appointment with Dr Apodaca office to get her Echo test re scheduled.

## 2022-06-15 NOTE — TELEPHONE ENCOUNTER
It could take up to a month.  Need to know if it is getting worse though.  It may not get better but should not get any worse.

## 2022-06-16 ENCOUNTER — CLINICAL SUPPORT (OUTPATIENT)
Dept: FAMILY MEDICINE CLINIC | Facility: CLINIC | Age: 42
End: 2022-06-16

## 2022-06-16 VITALS — DIASTOLIC BLOOD PRESSURE: 80 MMHG | SYSTOLIC BLOOD PRESSURE: 124 MMHG

## 2022-06-20 RX ORDER — PANTOPRAZOLE SODIUM 20 MG/1
TABLET, DELAYED RELEASE ORAL
Qty: 60 TABLET | Refills: 1 | Status: SHIPPED | OUTPATIENT
Start: 2022-06-20 | End: 2022-09-10 | Stop reason: SDUPTHER

## 2022-06-20 NOTE — TELEPHONE ENCOUNTER
Caller: Duane Talisha    Relationship: Self    Best call back number: [phone]    Requested Prescriptions:   Requested Prescriptions     Pending Prescriptions Disp Refills   • amphetamine-dextroamphetamine XR (Adderall XR) 20 MG 24 hr capsule 30 capsule 0     Sig: Take 1 capsule by mouth Every Morning        Pharmacy where request should be sent:[id number] [place], [place] - [address] [facility] [address] [phone] [facility] [phone]       Additional details provided by patient: PATIENT IS OUT OF THIS MEDICATION.     Does the patient have less than a 3 day supply:  [x] Yes  [] No    iMchael Rahman Rep   06/20/22 12:56 EDT

## 2022-06-21 RX ORDER — DEXTROAMPHETAMINE SACCHARATE, AMPHETAMINE ASPARTATE MONOHYDRATE, DEXTROAMPHETAMINE SULFATE AND AMPHETAMINE SULFATE 5; 5; 5; 5 MG/1; MG/1; MG/1; MG/1
20 CAPSULE, EXTENDED RELEASE ORAL EVERY MORNING
Qty: 30 CAPSULE | Refills: 0 | Status: SHIPPED | OUTPATIENT
Start: 2022-06-21 | End: 2022-07-19

## 2022-06-23 LAB — DRUGS UR: NORMAL

## 2022-06-24 ENCOUNTER — HOSPITAL ENCOUNTER (OUTPATIENT)
Dept: CARDIOLOGY | Facility: HOSPITAL | Age: 42
Discharge: HOME | End: 2022-06-24
Admitting: INTERNAL MEDICINE

## 2022-06-24 VITALS
SYSTOLIC BLOOD PRESSURE: 135 MMHG | DIASTOLIC BLOOD PRESSURE: 93 MMHG | BODY MASS INDEX: 21.35 KG/M2 | WEIGHT: 116 LBS | HEIGHT: 62 IN

## 2022-06-24 DIAGNOSIS — I50.42 CHRONIC COMBINED SYSTOLIC AND DIASTOLIC CONGESTIVE HEART FAILURE: ICD-10-CM

## 2022-06-24 LAB
AORTIC DIMENSIONLESS INDEX: 0.7 (DI)
BH CV ECHO MEAS - AO MAX PG: 6.7 MMHG
BH CV ECHO MEAS - AO MEAN PG: 4 MMHG
BH CV ECHO MEAS - AO V2 MAX: 129.8 CM/SEC
BH CV ECHO MEAS - AO V2 VTI: 24.6 CM
BH CV ECHO MEAS - AVA(I,D): 2.24 CM2
BH CV ECHO MEAS - EDV(CUBED): 145.2 ML
BH CV ECHO MEAS - EDV(MOD-SP2): 128 ML
BH CV ECHO MEAS - EDV(MOD-SP4): 112 ML
BH CV ECHO MEAS - EF(MOD-BP): 53 %
BH CV ECHO MEAS - EF(MOD-SP2): 51.6 %
BH CV ECHO MEAS - EF(MOD-SP4): 52.7 %
BH CV ECHO MEAS - ESV(CUBED): 68.3 ML
BH CV ECHO MEAS - ESV(MOD-SP2): 62 ML
BH CV ECHO MEAS - ESV(MOD-SP4): 53 ML
BH CV ECHO MEAS - FS: 22.2 %
BH CV ECHO MEAS - IVS/LVPW: 1.01 CM
BH CV ECHO MEAS - IVSD: 0.79 CM
BH CV ECHO MEAS - LAT PEAK E' VEL: 13.9 CM/SEC
BH CV ECHO MEAS - LV DIASTOLIC VOL/BSA (35-75): 73.9 CM2
BH CV ECHO MEAS - LV MASS(C)D: 143.4 GRAMS
BH CV ECHO MEAS - LV MAX PG: 3.1 MMHG
BH CV ECHO MEAS - LV MEAN PG: 1.75 MMHG
BH CV ECHO MEAS - LV SYSTOLIC VOL/BSA (12-30): 34.9 CM2
BH CV ECHO MEAS - LV V1 MAX: 88.3 CM/SEC
BH CV ECHO MEAS - LV V1 VTI: 16.6 CM
BH CV ECHO MEAS - LVIDD: 5.3 CM
BH CV ECHO MEAS - LVIDS: 4.1 CM
BH CV ECHO MEAS - LVOT AREA: 3.3 CM2
BH CV ECHO MEAS - LVOT DIAM: 2.06 CM
BH CV ECHO MEAS - LVPWD: 0.77 CM
BH CV ECHO MEAS - MED PEAK E' VEL: 8.6 CM/SEC
BH CV ECHO MEAS - MV A MAX VEL: 46.3 CM/SEC
BH CV ECHO MEAS - MV DEC SLOPE: 582.2 CM/SEC2
BH CV ECHO MEAS - MV DEC TIME: 135 MSEC
BH CV ECHO MEAS - MV E MAX VEL: 78.4 CM/SEC
BH CV ECHO MEAS - MV E/A: 1.69
BH CV ECHO MEAS - MV MAX PG: 2.03 MMHG
BH CV ECHO MEAS - MV MEAN PG: 1.07 MMHG
BH CV ECHO MEAS - MV P1/2T: 35.5 MSEC
BH CV ECHO MEAS - MV V2 VTI: 22.6 CM
BH CV ECHO MEAS - MVA(P1/2T): 6.2 CM2
BH CV ECHO MEAS - MVA(VTI): 2.44 CM2
BH CV ECHO MEAS - PA V2 MAX: 77.2 CM/SEC
BH CV ECHO MEAS - RAP SYSTOLE: 3 MMHG
BH CV ECHO MEAS - RV MAX PG: 1.4 MMHG
BH CV ECHO MEAS - RV V1 MAX: 59.1 CM/SEC
BH CV ECHO MEAS - RV V1 VTI: 14 CM
BH CV ECHO MEAS - SI(MOD-SP2): 43.5 ML/M2
BH CV ECHO MEAS - SI(MOD-SP4): 38.9 ML/M2
BH CV ECHO MEAS - SV(LVOT): 55.2 ML
BH CV ECHO MEAS - SV(MOD-SP2): 66 ML
BH CV ECHO MEAS - SV(MOD-SP4): 59 ML
BH CV ECHO MEAS - TAPSE (>1.6): 2.16 CM
BH CV ECHO MEASUREMENTS AVERAGE E/E' RATIO: 6.97
BH CV STRESS BP STAGE 1: NORMAL
BH CV STRESS BP STAGE 2: NORMAL
BH CV STRESS BP STAGE 3: NORMAL
BH CV STRESS BP STAGE 4: NORMAL
BH CV STRESS DURATION MIN STAGE 1: 3
BH CV STRESS DURATION MIN STAGE 2: 3
BH CV STRESS DURATION MIN STAGE 3: 3
BH CV STRESS DURATION MIN STAGE 4: 0
BH CV STRESS DURATION SEC STAGE 1: 0
BH CV STRESS DURATION SEC STAGE 2: 0
BH CV STRESS DURATION SEC STAGE 3: 0
BH CV STRESS DURATION SEC STAGE 4: 10
BH CV STRESS ECHO POST STRESS EJECTION FRACTION EF: 52 %
BH CV STRESS GRADE STAGE 1: 10
BH CV STRESS GRADE STAGE 2: 12
BH CV STRESS GRADE STAGE 3: 14
BH CV STRESS GRADE STAGE 4: 16
BH CV STRESS HR STAGE 1: 108
BH CV STRESS HR STAGE 2: 128
BH CV STRESS HR STAGE 3: 136
BH CV STRESS HR STAGE 4: 141
BH CV STRESS METS STAGE 1: 5
BH CV STRESS METS STAGE 2: 7.5
BH CV STRESS METS STAGE 3: 10
BH CV STRESS METS STAGE 4: 13.5
BH CV STRESS PROTOCOL 1: NORMAL
BH CV STRESS RECOVERY BP: NORMAL MMHG
BH CV STRESS RECOVERY HR: 80 BPM
BH CV STRESS SPEED STAGE 1: 1.7
BH CV STRESS SPEED STAGE 2: 2.5
BH CV STRESS SPEED STAGE 3: 3.4
BH CV STRESS SPEED STAGE 4: 4.2
BH CV STRESS STAGE 1: 1
BH CV STRESS STAGE 2: 2
BH CV STRESS STAGE 3: 3
BH CV STRESS STAGE 4: 4
BH CV XLRA - RV BASE: 2.41 CM
BH CV XLRA - TDI S': 9.4 CM/SEC
LEFT ATRIUM VOLUME INDEX: 26.6 ML/M2
LV EF 2D ECHO EST: 53 %
MAXIMAL PREDICTED HEART RATE: 180 BPM
PERCENT MAX PREDICTED HR: 78.33 %
SINUS: 2.8 CM
STRESS BASELINE BP: NORMAL MMHG
STRESS BASELINE HR: 83 BPM
STRESS PERCENT HR: 92 %
STRESS POST ESTIMATED WORKLOAD: 10.6 METS
STRESS POST EXERCISE DUR MIN: 9 MIN
STRESS POST EXERCISE DUR SEC: 10 SEC
STRESS POST PEAK BP: NORMAL MMHG
STRESS POST PEAK HR: 141 BPM
STRESS TARGET HR: 153 BPM

## 2022-06-24 PROCEDURE — 93350 STRESS TTE ONLY: CPT | Performed by: INTERNAL MEDICINE

## 2022-06-24 PROCEDURE — 93352 ADMIN ECG CONTRAST AGENT: CPT | Performed by: INTERNAL MEDICINE

## 2022-06-24 PROCEDURE — 25010000002 PERFLUTREN (DEFINITY) 8.476 MG IN SODIUM CHLORIDE (PF) 0.9 % 10 ML INJECTION: Performed by: INTERNAL MEDICINE

## 2022-06-24 PROCEDURE — 93018 CV STRESS TEST I&R ONLY: CPT | Performed by: INTERNAL MEDICINE

## 2022-06-24 PROCEDURE — 93350 STRESS TTE ONLY: CPT

## 2022-06-24 PROCEDURE — 93320 DOPPLER ECHO COMPLETE: CPT | Performed by: INTERNAL MEDICINE

## 2022-06-24 PROCEDURE — 93325 DOPPLER ECHO COLOR FLOW MAPG: CPT | Performed by: INTERNAL MEDICINE

## 2022-06-24 PROCEDURE — 93320 DOPPLER ECHO COMPLETE: CPT

## 2022-06-24 PROCEDURE — 93016 CV STRESS TEST SUPVJ ONLY: CPT | Performed by: INTERNAL MEDICINE

## 2022-06-24 PROCEDURE — 93325 DOPPLER ECHO COLOR FLOW MAPG: CPT

## 2022-06-24 PROCEDURE — 93017 CV STRESS TEST TRACING ONLY: CPT

## 2022-06-24 RX ADMIN — SODIUM CHLORIDE 5 ML: 9 INJECTION INTRAMUSCULAR; INTRAVENOUS; SUBCUTANEOUS at 11:36

## 2022-07-12 ENCOUNTER — OFFICE VISIT (OUTPATIENT)
Dept: FAMILY MEDICINE CLINIC | Facility: CLINIC | Age: 42
End: 2022-07-12

## 2022-07-12 VITALS
HEART RATE: 80 BPM | BODY MASS INDEX: 21.42 KG/M2 | WEIGHT: 116.4 LBS | TEMPERATURE: 97.3 F | DIASTOLIC BLOOD PRESSURE: 72 MMHG | HEIGHT: 62 IN | OXYGEN SATURATION: 99 % | SYSTOLIC BLOOD PRESSURE: 110 MMHG

## 2022-07-12 DIAGNOSIS — F90.0 ATTENTION DEFICIT HYPERACTIVITY DISORDER (ADHD), PREDOMINANTLY INATTENTIVE TYPE: ICD-10-CM

## 2022-07-12 DIAGNOSIS — I10 PRIMARY HYPERTENSION: Primary | ICD-10-CM

## 2022-07-12 DIAGNOSIS — F41.9 ANXIETY: ICD-10-CM

## 2022-07-12 PROCEDURE — 99214 OFFICE O/P EST MOD 30 MIN: CPT | Performed by: INTERNAL MEDICINE

## 2022-07-12 NOTE — PROGRESS NOTES
"Chief Complaint  Follow-up (Did stress echo test), Blood Pressure Check, and referral    Subjective        Talisha Zepeda presents to [facility]  History of Present Illness patient was seen for ADHD.  Patient has been taking Adderall XR 20 mg daily.  Patient is having some issues with anxiety and elevated blood pressure.  Patient had her lisinopril increased from 10 to 20 mg daily and blood pressure was running 110s over 70s.  Patient was informed her Adderall could cause hypertension and anxiety.  Patient was advised that Vyvanse still could raise her blood pressure and anxiety but was much safer.  Patient was given a prescription and we will see if the insurance covers it.  Patient did want SSRI for anxiety she was placed on 50 mg of Zoloft daily.  Patient will follow-up in 6 weeks.  Patient does exercise and will continue doing so.    Dictated utilizing Dragon dictation. If there are questions or for further clarification, please contact me.    Objective   Vital Signs:  Blood Pressure 110/72 (BP Location: Left arm, Patient Position: Sitting, Cuff Size: Adult)   Pulse 80   Temperature 97.3 °F (36.3 °C) (Infrared)   Height 157.5 cm (62.01\")   Weight 52.8 kg (116 lb 6.4 oz)   Oxygen Saturation 99%   Body Mass Index 21.28 kg/m²   Estimated body mass index is 21.28 kg/m² as calculated from the following:    Height as of this encounter: 157.5 cm (62.01\").    Weight as of this encounter: 52.8 kg (116 lb 6.4 oz).    BMI is within normal parameters. No other follow-up for BMI required.      Physical Exam  Vitals and nursing note reviewed.   Constitutional:       Appearance: Normal appearance. She is well-developed.   HENT:      Head: Normocephalic and atraumatic.      Nose: Nose normal.      Mouth/Throat:      Mouth: Mucous membranes are moist.      Pharynx: Oropharynx is clear.   Eyes:      Extraocular Movements: Extraocular movements intact.      Conjunctiva/sclera: Conjunctivae " normal.      Pupils: Pupils are equal, round, and reactive to light.   Cardiovascular:      Rate and Rhythm: Normal rate and regular rhythm.      Heart sounds: Normal heart sounds. No murmur heard.    No friction rub. No gallop.   Pulmonary:      Effort: Pulmonary effort is normal. No respiratory distress.      Breath sounds: Normal breath sounds. No stridor. No wheezing, rhonchi or rales.   Chest:      Chest wall: No tenderness.   Abdominal:      General: Bowel sounds are normal.      Palpations: Abdomen is soft.   Musculoskeletal:         General: Normal range of motion.      Cervical back: Normal range of motion and neck supple.   Skin:     General: Skin is warm and dry.   Neurological:      General: No focal deficit present.      Mental Status: She is alert and oriented to person, place, and time. Mental status is at baseline.   Psychiatric:         Mood and Affect: Mood normal.         Behavior: Behavior normal.         Thought Content: Thought content normal.         Judgment: Judgment normal.        Result Review :                Assessment and Plan  #1 Zoloft 50 mg daily #2 monitor blood pressure follow-up in 6 weeks #3 change Adderall XR to Vyvanse 30 mg daily  Diagnoses and all orders for this visit:    1. Primary hypertension (Primary)    2. Attention deficit hyperactivity disorder (ADHD), predominantly inattentive type  -     lisdexamfetamine (Vyvanse) 30 MG capsule; Take 1 capsule by mouth Every Morning ADHD  Dispense: 30 capsule; Refill: 0    3. Anxiety    Other orders  -     sertraline (Zoloft) 50 MG tablet; Take 1 tablet by mouth Daily.  Dispense: 30 tablet; Refill: 3             Follow Up   Return in about 6 weeks (around 8/23/2022).  Patient was given instructions and counseling regarding her condition or for health maintenance advice. Please see specific information pulled into the AVS if appropriate.

## 2022-07-19 ENCOUNTER — OFFICE VISIT (OUTPATIENT)
Dept: OBSTETRICS AND GYNECOLOGY | Facility: CLINIC | Age: 42
End: 2022-07-19

## 2022-07-19 VITALS
DIASTOLIC BLOOD PRESSURE: 74 MMHG | WEIGHT: 114.8 LBS | BODY MASS INDEX: 21.12 KG/M2 | SYSTOLIC BLOOD PRESSURE: 118 MMHG | HEIGHT: 62 IN

## 2022-07-19 DIAGNOSIS — R10.2 PELVIC PAIN IN FEMALE: ICD-10-CM

## 2022-07-19 DIAGNOSIS — F41.9 ANXIETY: ICD-10-CM

## 2022-07-19 DIAGNOSIS — Z90.79 H/O BILATERAL SALPINGECTOMY: ICD-10-CM

## 2022-07-19 DIAGNOSIS — I10 PRIMARY HYPERTENSION: ICD-10-CM

## 2022-07-19 DIAGNOSIS — N83.201 BILATERAL OVARIAN CYSTS: ICD-10-CM

## 2022-07-19 DIAGNOSIS — N83.202 BILATERAL OVARIAN CYSTS: ICD-10-CM

## 2022-07-19 DIAGNOSIS — Z90.710 S/P LAPAROSCOPIC HYSTERECTOMY: ICD-10-CM

## 2022-07-19 DIAGNOSIS — R10.2 PELVIC PAIN: Primary | ICD-10-CM

## 2022-07-19 PROBLEM — Z98.890 HISTORY OF ENDOMETRIAL ABLATION: Status: RESOLVED | Noted: 2021-09-28 | Resolved: 2022-07-19

## 2022-07-19 PROBLEM — Z98.891 HISTORY OF 3 CESAREAN SECTIONS: Status: RESOLVED | Noted: 2019-01-08 | Resolved: 2022-07-19

## 2022-07-19 PROBLEM — N92.1 MENORRHAGIA WITH IRREGULAR CYCLE: Status: RESOLVED | Noted: 2019-01-08 | Resolved: 2022-07-19

## 2022-07-19 LAB
BILIRUB BLD-MCNC: NEGATIVE MG/DL
CLARITY, POC: CLEAR
COLOR UR: YELLOW
GLUCOSE UR STRIP-MCNC: NEGATIVE MG/DL
KETONES UR QL: NEGATIVE
LEUKOCYTE EST, POC: NEGATIVE
NITRITE UR-MCNC: NEGATIVE MG/ML
PH UR: 5 [PH] (ref 5–8)
PROT UR STRIP-MCNC: NEGATIVE MG/DL
RBC # UR STRIP: NEGATIVE /UL
SP GR UR: 1 (ref 1–1.03)
UROBILINOGEN UR QL: NORMAL

## 2022-07-19 PROCEDURE — 99214 OFFICE O/P EST MOD 30 MIN: CPT | Performed by: OBSTETRICS & GYNECOLOGY

## 2022-07-19 NOTE — PROGRESS NOTES
"EVALUATION AND MANAGEMENT ENCOUNTER    Talisha Zepeda  Patient new to examiner? No  New problem to examiner? No  Patient referred? No    -----------------------------------------------------HISTORY---------------------------------------------------    Chief Complaint:   Chief Complaint   Patient presents with   • Gynecologic Exam   • Pelvic Pain       HPI:  Talisha Zepeda is a 41 y.o.  with Patient's last menstrual period was 10/29/2021 (approximate). here for concerns about recurrent ovarian cysts.  Pt has had a hysterectomy.    Pain is bilateral and moderate.  Pt denies bowel problems, no UTI symptoms.  No bleeding.     History of Present Illness     Talisha Zepeda  reports that she quit smoking about 7 years ago. Her smoking use included cigarettes. She quit after 15.00 years of use. She has never used smokeless tobacco..           ROS:  Review of Systems   Constitutional: Negative.    HENT: Negative.    Eyes: Negative.    Respiratory: Negative.    Cardiovascular: Negative.    Gastrointestinal: Negative.    Endocrine: Negative.    Genitourinary: Positive for pelvic pain.   Musculoskeletal: Negative.    Skin: Negative.    Allergic/Immunologic: Negative.    Neurological: Negative.    Hematological: Negative.    Psychiatric/Behavioral: Negative.    :    Patient reports that she is not currently experiencing any symptoms of urinary incontinence.      noTESTED FOR CHLAMYDIA?  -----------------------------------------------PHYSICAL EXAM----------------------------------------------    Vital Signs: /74   Ht 157.5 cm (62.01\")   Wt 52.1 kg (114 lb 12.8 oz)   LMP 10/29/2021 (Approximate) Comment: blee  Breastfeeding No   BMI 20.99 kg/m²    Flowsheet Rows    Flowsheet Row First Filed Value   Admission Height 157.5 cm (62.01\") Documented at 2022 0925   Admission Weight --          Physical Exam  Vitals and nursing note reviewed.   Constitutional:       Appearance: She is well-developed.   HENT:     "  Head: Normocephalic and atraumatic.   Cardiovascular:      Rate and Rhythm: Normal rate.   Pulmonary:      Effort: Pulmonary effort is normal.   Abdominal:      General: There is no distension.      Palpations: Abdomen is soft. There is no mass.      Tenderness: There is no abdominal tenderness. There is no guarding.   Genitourinary:     Vagina: No vaginal discharge.   Musculoskeletal:         General: No tenderness or deformity. Normal range of motion.      Cervical back: Normal range of motion.   Skin:     General: Skin is warm and dry.      Coloration: Skin is not pale.      Findings: No erythema or rash.   Neurological:      Mental Status: She is alert and oriented to person, place, and time.   Psychiatric:         Behavior: Behavior normal.         Thought Content: Thought content normal.         Judgment: Judgment normal.         I saw the patient with a face mask, gloves and eye protection  The patient herself was masked.  Social distancing was observed as appropriate. All COVID precautions observed.     -----------------------------------------------MEDICAL DECISION MAKING-----------------------------        DATA Review & labs ordered:     1.   Lab Results (last 24 hours)     Procedure Component Value Units Date/Time    POC Urinalysis Dipstick [[id number]]  (Normal) Collected: 07/19/22 0945    Specimen: Urine Updated: 07/19/22 0945     Color Yellow     Clarity, UA Clear     Glucose, UA Negative mg/dL      Bilirubin Negative     Ketones, UA Negative     Specific Gravity  1.005     Blood, UA Negative     pH, Urine 5.0     Protein, POC Negative mg/dL      Urobilinogen, UA Normal     Leukocytes Negative     Nitrite, UA Negative        2.   Imaging Results (Last 24 Hours)     ** No results found for the last 24 hours. **        3.   ECG/EMG Results (most recent)     None              Diagnoses and all orders for this visit:    1. Pelvic pain (Primary)  -     POC Urinalysis Dipstick    2. Primary  hypertension    3. Pelvic pain in female    4. H/O bilateral salpingectomy    5. hx: Bilateral ovarian cysts    6. S/P laparoscopic hysterectomy: 11/4/21    7. Anxiety            PLAN:     1. Vaginal u/s  2. Return to office.  3. May be candidate for ovulation suppression  4. Non smoker      Pt instructed to call for results of any testing done today and that failure to call if she has not heard from us could result in a bad outcome.  Pt verbalized her understanding.     RTO Return in about 2 weeks (around 8/2/2022) for Recheck..  Instructions and precautions given.     I spent 30+ minutes caring for Talisha on this date of service. This time includes time spent by me in the following activities: preparing for the visit, reviewing tests, obtaining and/or reviewing a separately obtained history, performing a medically appropriate examination and/or evaluation, counseling and educating the patient/family/caregiver, ordering medications, tests, or procedures, referring and communicating with other health care professionals, documenting information in the medical record, independently interpreting results and communicating that information with the patient/family/caregiver and care coordination    Elvis Andre MD  10:12 EDT  07/19/22

## 2022-08-11 DIAGNOSIS — F90.0 ATTENTION DEFICIT HYPERACTIVITY DISORDER (ADHD), PREDOMINANTLY INATTENTIVE TYPE: ICD-10-CM

## 2022-08-11 NOTE — TELEPHONE ENCOUNTER
Caller: Talisha Zepeda    Relationship: Self    Best call back number: [phone]     Requested Prescriptions:   Requested Prescriptions     Pending Prescriptions Disp Refills   • lisdexamfetamine (Vyvanse) 30 MG capsule 30 capsule 0     Sig: Take 1 capsule by mouth Every Morning ADHD        Pharmacy where request should be sent: [id number] [place], [place] - [address] [facility] [address] [facility] [phone] [facility] [phone]      Additional details provided by patient:   Does the patient have less than a 3 day supply:  [x] Yes  [] No    Michael Ro Rep   08/11/22 09:58 EDT

## 2022-08-19 ENCOUNTER — OFFICE VISIT (OUTPATIENT)
Dept: FAMILY MEDICINE CLINIC | Facility: CLINIC | Age: 42
End: 2022-08-19

## 2022-08-19 VITALS
HEART RATE: 79 BPM | WEIGHT: 120.4 LBS | TEMPERATURE: 97.1 F | HEIGHT: 62 IN | DIASTOLIC BLOOD PRESSURE: 86 MMHG | SYSTOLIC BLOOD PRESSURE: 136 MMHG | OXYGEN SATURATION: 97 % | BODY MASS INDEX: 22.16 KG/M2 | RESPIRATION RATE: 14 BRPM

## 2022-08-19 DIAGNOSIS — I10 PRIMARY HYPERTENSION: ICD-10-CM

## 2022-08-19 DIAGNOSIS — F90.8 ATTENTION DEFICIT HYPERACTIVITY DISORDER (ADHD), OTHER TYPE: Primary | ICD-10-CM

## 2022-08-19 PROCEDURE — 99213 OFFICE O/P EST LOW 20 MIN: CPT | Performed by: INTERNAL MEDICINE

## 2022-08-19 NOTE — PROGRESS NOTES
"Chief Complaint  ADHD (Follow up)    Subjective        Talisha Zepeda presents to [facility] patient was seen for ADHD.  Patient has recently been on Vyvanse 30 mg daily.  Patient is feeling much better with this dose and will continue it.  Patient blood pressures been running 130s over 80s at home.  Patient will continue lisinopril 20 mg daily.  Patient will follow-up in 4 months.    Dictated utilizing Dragon dictation. If there are questions or for further clarification, please contact me.  History of Present Illness    Objective   Vital Signs:  Blood Pressure 136/86   Pulse 79   Temperature 97.1 °F (36.2 °C)   Respiration 14   Height 157.5 cm (62\")   Weight 54.6 kg (120 lb 6.4 oz)   Oxygen Saturation 97%   Body Mass Index 22.02 kg/m²   Estimated body mass index is 22.02 kg/m² as calculated from the following:    Height as of this encounter: 157.5 cm (62\").    Weight as of this encounter: 54.6 kg (120 lb 6.4 oz).    BMI is within normal parameters. No other follow-up for BMI required.      Physical Exam  Vitals and nursing note reviewed.   Constitutional:       Appearance: Normal appearance. She is well-developed.   HENT:      Head: Normocephalic and atraumatic.      Nose: Nose normal.      Mouth/Throat:      Mouth: Mucous membranes are moist.      Pharynx: Oropharynx is clear.   Eyes:      Extraocular Movements: Extraocular movements intact.      Conjunctiva/sclera: Conjunctivae normal.      Pupils: Pupils are equal, round, and reactive to light.   Cardiovascular:      Rate and Rhythm: Normal rate and regular rhythm.      Heart sounds: Normal heart sounds. No murmur heard.    No friction rub. No gallop.   Pulmonary:      Effort: Pulmonary effort is normal. No respiratory distress.      Breath sounds: Normal breath sounds. No stridor. No wheezing, rhonchi or rales.   Chest:      Chest wall: No tenderness.   Abdominal:      General: Bowel sounds are normal.      Palpations: " Abdomen is soft.   Musculoskeletal:         General: Normal range of motion.      Cervical back: Normal range of motion and neck supple.   Skin:     General: Skin is warm and dry.   Neurological:      General: No focal deficit present.      Mental Status: She is alert and oriented to person, place, and time. Mental status is at baseline.   Psychiatric:         Mood and Affect: Mood normal.         Behavior: Behavior normal.         Thought Content: Thought content normal.         Judgment: Judgment normal.        Result Review :                Assessment and Plan  #1 continue Vyvanse No. 2 continue monitoring blood pressure  Diagnoses and all orders for this visit:    1. Attention deficit hyperactivity disorder (ADHD), other type (Primary)    2. Primary hypertension             Follow Up   Return in about 4 months (around 12/19/2022), or if symptoms worsen or fail to improve, for Recheck.  Patient was given instructions and counseling regarding her condition or for health maintenance advice. Please see specific information pulled into the AVS if appropriate.

## 2022-09-09 ENCOUNTER — OFFICE VISIT (OUTPATIENT)
Dept: OBSTETRICS AND GYNECOLOGY | Facility: CLINIC | Age: 42
End: 2022-09-09

## 2022-09-09 VITALS
DIASTOLIC BLOOD PRESSURE: 78 MMHG | WEIGHT: 120 LBS | SYSTOLIC BLOOD PRESSURE: 128 MMHG | HEIGHT: 62 IN | BODY MASS INDEX: 22.08 KG/M2

## 2022-09-09 DIAGNOSIS — N83.201 RIGHT OVARIAN CYST: ICD-10-CM

## 2022-09-09 DIAGNOSIS — I10 PRIMARY HYPERTENSION: Primary | ICD-10-CM

## 2022-09-09 DIAGNOSIS — Z90.710 S/P LAPAROSCOPIC HYSTERECTOMY: ICD-10-CM

## 2022-09-09 DIAGNOSIS — R10.2 PELVIC PAIN IN FEMALE: ICD-10-CM

## 2022-09-09 DIAGNOSIS — Z90.79 H/O BILATERAL SALPINGECTOMY: ICD-10-CM

## 2022-09-09 PROCEDURE — 99213 OFFICE O/P EST LOW 20 MIN: CPT | Performed by: OBSTETRICS & GYNECOLOGY

## 2022-09-09 RX ORDER — IBUPROFEN 800 MG/1
800 TABLET ORAL EVERY 8 HOURS PRN
Qty: 30 TABLET | Refills: 0 | OUTPATIENT
Start: 2022-09-09 | End: 2022-12-03

## 2022-09-09 RX ORDER — IBUPROFEN 800 MG/1
800 TABLET ORAL EVERY 8 HOURS PRN
Qty: 30 TABLET | Refills: 0 | Status: SHIPPED | OUTPATIENT
Start: 2022-09-09 | End: 2022-09-09 | Stop reason: SDUPTHER

## 2022-09-09 NOTE — PROGRESS NOTES
"EVALUATION AND MANAGEMENT ENCOUNTER    Talisha Zepeda  Patient new to examiner? No  New problem to examiner? Yes  Patient referred? No    -----------------------------------------------------HISTORY---------------------------------------------------    Chief Complaint:   Chief Complaint   Patient presents with   • Follow-up     US       HPI:  Talisha Zepeda is a 42 y.o.  with Patient's last menstrual period was 10/29/2021 (approximate). here for f/u u/s:Hx hysterectomy, R ov: multiple cysts, largest 5.59cm, L ov wnl. No free fluid. .        History of Present Illness     Talisha Zepeda  reports that she quit smoking about 7 years ago. Her smoking use included cigarettes. She started smoking about 23 years ago. She has a 6.50 pack-year smoking history. She has never used smokeless tobacco..            ROS:  Review of Systems   Constitutional: Negative.    HENT: Negative.    Eyes: Negative.    Respiratory: Negative.    Cardiovascular: Negative.    Gastrointestinal: Negative.    Endocrine: Negative.    Genitourinary: Positive for pelvic pain.   Musculoskeletal: Negative.    Skin: Negative.    Allergic/Immunologic: Negative.    Neurological: Negative.    Hematological: Negative.    Psychiatric/Behavioral: Negative.    :    Patient reports that she is not currently experiencing any symptoms of urinary incontinence.      noTESTED FOR CHLAMYDIA?  -----------------------------------------------PHYSICAL EXAM----------------------------------------------    Vital Signs: /78   Ht 157.5 cm (62.01\")   Wt 54.4 kg (120 lb)   LMP 10/29/2021 (Approximate) Comment: blee  BMI 21.94 kg/m²    Flowsheet Rows    Flowsheet Row First Filed Value   Admission Height 157.5 cm (62.01\") Documented at 2022 1109   Admission Weight 54.4 kg (120 lb) Documented at 2022 1109          Physical Exam  Vitals and nursing note reviewed.   Constitutional:       Appearance: She is well-developed.   HENT:      Head: " Normocephalic and atraumatic.   Cardiovascular:      Rate and Rhythm: Normal rate.   Pulmonary:      Effort: Pulmonary effort is normal.   Abdominal:      General: There is no distension.      Palpations: Abdomen is soft. There is no mass.      Tenderness: There is no abdominal tenderness. There is no guarding.   Genitourinary:     Vagina: No vaginal discharge.   Musculoskeletal:         General: No tenderness or deformity. Normal range of motion.      Cervical back: Normal range of motion.   Skin:     General: Skin is warm and dry.      Coloration: Skin is not pale.      Findings: No erythema or rash.   Neurological:      Mental Status: She is alert and oriented to person, place, and time.   Psychiatric:         Behavior: Behavior normal.         Thought Content: Thought content normal.         Judgment: Judgment normal.         I saw the patient with a face mask, gloves and eye protection  The patient herself was masked.  Social distancing was observed as appropriate. All COVID precautions observed.     -----------------------------------------------MEDICAL DECISION MAKING-----------------------------        DATA Review & labs ordered:     1.   Lab Results (last 24 hours)     ** No results found for the last 24 hours. **        2.   Imaging Results (Last 24 Hours)     ** No results found for the last 24 hours. **        3.   ECG/EMG Results (most recent)     None              Diagnoses and all orders for this visit:    1. Primary hypertension (Primary)    2. Pelvic pain in female    3. H/O bilateral salpingectomy    4. S/P laparoscopic hysterectomy: 11/4/21    5. Right ovarian cyst    Other orders  -     Discontinue: ibuprofen (ADVIL,MOTRIN) 800 MG tablet; Take 1 tablet by mouth Every 8 (Eight) Hours As Needed for Moderate Pain (pain).  Dispense: 30 tablet; Refill: 0  -     ibuprofen (ADVIL,MOTRIN) 800 MG tablet; Take 1 tablet by mouth Every 8 (Eight) Hours As Needed for Moderate Pain (pain).  Dispense: 30 tablet;  Refill: 0        IMPRESSION/PROBLEM:      R ovarian cyst, RLQ pain    (Established problem/s? No, worsening? Yes)    (New Problem/s? Yes, additional workup planned? Yes)      PLAN:     1. Ovarian torsion precautions given  2. rto 2 weeks for rpt u/s and see me after        Pt instructed to call for results of any testing done today if she does not hear from us, and that failure to do so could result in inadequate treatment . Pt verbalized her understanding.     RTO Return in about 2 weeks (around 9/23/2022) for Recheck. FOR rpt u/s.  Instructions and precautions given.     I spent 20+ minutes caring for Talisha on this date of service. This time includes time spent by me in the following activities: preparing for the visit, reviewing tests, obtaining and/or reviewing a separately obtained history, performing a medically appropriate examination and/or evaluation, counseling and educating the patient/family/caregiver, ordering medications, tests, or procedures, referring and communicating with other health care professionals, documenting information in the medical record, independently interpreting results and communicating that information with the patient/family/caregiver and care coordination    Elvis Andre MD  11:39 EDT  09/09/22

## 2022-09-11 RX ORDER — PANTOPRAZOLE SODIUM 20 MG/1
20 TABLET, DELAYED RELEASE ORAL 2 TIMES DAILY
Qty: 60 TABLET | Refills: 1 | Status: SHIPPED | OUTPATIENT
Start: 2022-09-11 | End: 2022-09-15 | Stop reason: ALTCHOICE

## 2022-09-13 DIAGNOSIS — F90.0 ATTENTION DEFICIT HYPERACTIVITY DISORDER (ADHD), PREDOMINANTLY INATTENTIVE TYPE: ICD-10-CM

## 2022-09-13 NOTE — TELEPHONE ENCOUNTER
Caller: Talisha Zepeda    Relationship: Self    Best call back number: [phone]    Requested Prescriptions:   Requested Prescriptions     Pending Prescriptions Disp Refills   • lisdexamfetamine (Vyvanse) 30 MG capsule 30 capsule 0     Sig: Take 1 capsule by mouth Every Morning ADHD        Pharmacy where request should be sent: [facility] #[id number] [place], [place] - [address] [facility] [address] [facility] [phone] [facility] [phone]      Additional details provided by patient: PATIENT IS OUT AFTER TODAY,     Does the patient have less than a 3 day supply:  [x] Yes  [] No    Michael FLYNN   09/13/22 09:02 EDT

## 2022-09-15 ENCOUNTER — TELEPHONE (OUTPATIENT)
Dept: FAMILY MEDICINE CLINIC | Facility: CLINIC | Age: 42
End: 2022-09-15

## 2022-09-15 RX ORDER — OMEPRAZOLE 40 MG/1
40 CAPSULE, DELAYED RELEASE ORAL DAILY
Qty: 90 CAPSULE | Refills: 1 | Status: SHIPPED | OUTPATIENT
Start: 2022-09-15 | End: 2023-01-30

## 2022-09-15 NOTE — TELEPHONE ENCOUNTER
Patient PA was denied for Pantoprazole, she has never taken any other PPI do you want to give something else?

## 2022-09-15 NOTE — TELEPHONE ENCOUNTER
PATIENT CALLED IN REGARDS TO MEDICATION REFILL OF    pantoprazole (PROTONIX) 20 MG EC tablet    PHARMACY STATES INSURANCE WILL NOT COVER THIS MEDICATION. MAY NEED A PRE AUTH.   SHE IS OUT OF MEDICATION    [facility] #[id number] - [place], [place] - [address] [facility] [address] [facility] - [phone]  - [phone]   [phone]    CALL BACK NUMBER [phone]

## 2022-10-03 RX ORDER — LISINOPRIL 20 MG/1
20 TABLET ORAL DAILY
Qty: 30 TABLET | Refills: 3 | Status: SHIPPED | OUTPATIENT
Start: 2022-10-03 | End: 2023-01-31

## 2022-10-13 DIAGNOSIS — F90.0 ATTENTION DEFICIT HYPERACTIVITY DISORDER (ADHD), PREDOMINANTLY INATTENTIVE TYPE: ICD-10-CM

## 2022-10-13 NOTE — TELEPHONE ENCOUNTER
Caller: Duane Talisha    Relationship: Self    Best call back number: [phone]    Requested Prescriptions:   Requested Prescriptions     Pending Prescriptions Disp Refills   • lisdexamfetamine (Vyvanse) 30 MG capsule 30 capsule 0     Sig: Take 1 capsule by mouth Every Morning ADHD        Pharmacy where request should be sent: [facility] #[id number] [place], [place] - [address] [facility] [address] [facility] [phone] [facility] [phone]      Additional details provided by patient:PATIENT IS OUT    Does the patient have less than a 3 day supply:  [x] Yes  [] No    Michael Angel Rep   10/13/22 14:17 EDT

## 2022-10-24 ENCOUNTER — OFFICE VISIT (OUTPATIENT)
Dept: OBSTETRICS AND GYNECOLOGY | Facility: CLINIC | Age: 42
End: 2022-10-24

## 2022-10-24 VITALS
SYSTOLIC BLOOD PRESSURE: 126 MMHG | DIASTOLIC BLOOD PRESSURE: 78 MMHG | HEIGHT: 62 IN | BODY MASS INDEX: 22.01 KG/M2 | WEIGHT: 119.6 LBS

## 2022-10-24 DIAGNOSIS — N83.201 BILATERAL OVARIAN CYSTS: ICD-10-CM

## 2022-10-24 DIAGNOSIS — Z90.79 H/O BILATERAL SALPINGECTOMY: ICD-10-CM

## 2022-10-24 DIAGNOSIS — N83.202 BILATERAL OVARIAN CYSTS: ICD-10-CM

## 2022-10-24 DIAGNOSIS — Z90.710 S/P LAPAROSCOPIC HYSTERECTOMY: ICD-10-CM

## 2022-10-24 PROBLEM — R10.2 PELVIC PAIN IN FEMALE: Status: RESOLVED | Noted: 2018-10-10 | Resolved: 2022-10-24

## 2022-10-24 PROCEDURE — 99213 OFFICE O/P EST LOW 20 MIN: CPT | Performed by: OBSTETRICS & GYNECOLOGY

## 2022-10-24 RX ORDER — NORGESTIMATE AND ETHINYL ESTRADIOL 0.25-0.035
1 KIT ORAL DAILY
Qty: 84 EACH | Refills: 6 | Status: SHIPPED | OUTPATIENT
Start: 2022-10-24 | End: 2023-02-21 | Stop reason: SDUPTHER

## 2022-10-24 RX ORDER — PREDNISONE 20 MG/1
TABLET ORAL
COMMUNITY
Start: 2022-10-11 | End: 2022-12-09

## 2022-10-24 NOTE — PROGRESS NOTES
"EVALUATION AND MANAGEMENT ENCOUNTER    Talisha Zepeda  Patient new to examiner? No  New problem to examiner? No  Patient referred? No    -----------------------------------------------------HISTORY---------------------------------------------------    Chief Complaint:   Chief Complaint   Patient presents with   • Follow-up     US       HPI:  Talisha Zepeda is a 42 y.o.  with Patient's last menstrual period was 10/29/2021 (approximate). here for rpt u/s to ovarian cyst f/u.  Pt feels ok.  U/s today: Ut absent, R ov: follicles, L ov 3.3cm cyst.  No free fluid.      ROS:  Review of Systems   Constitutional: Negative.    HENT: Negative.    Eyes: Negative.    Respiratory: Negative.    Cardiovascular: Negative.    Gastrointestinal: Negative.    Endocrine: Negative.    Musculoskeletal: Negative.    Skin: Negative.    Allergic/Immunologic: Negative.    Neurological: Negative.    Hematological: Negative.    Psychiatric/Behavioral: Negative.        Patient reports that she is not currently experiencing any symptoms of urinary incontinence.      noTESTED FOR CHLAMYDIA?  -----------------------------------------------PHYSICAL EXAM----------------------------------------------    Vital Signs: /78   Ht 157.5 cm (62\")   Wt 54.3 kg (119 lb 9.6 oz)   LMP 10/29/2021 (Approximate) Comment: blee  BMI 21.88 kg/m²    Flowsheet Rows    Flowsheet Row First Filed Value   Admission Height 157.5 cm (62\") Documented at 10/24/2022 1452   Admission Weight 54.3 kg (119 lb 9.6 oz) Documented at 10/24/2022 1452          Physical Exam  Vitals and nursing note reviewed.   Constitutional:       Appearance: She is well-developed.   HENT:      Head: Normocephalic and atraumatic.   Cardiovascular:      Rate and Rhythm: Normal rate.   Pulmonary:      Effort: Pulmonary effort is normal.   Abdominal:      General: There is no distension.      Palpations: Abdomen is soft. There is no mass.      Tenderness: There is no abdominal tenderness. " There is no guarding.   Genitourinary:     Vagina: No vaginal discharge.   Musculoskeletal:         General: No tenderness or deformity. Normal range of motion.      Cervical back: Normal range of motion.   Skin:     General: Skin is warm and dry.      Coloration: Skin is not pale.      Findings: No erythema or rash.   Neurological:      Mental Status: She is alert and oriented to person, place, and time.   Psychiatric:         Behavior: Behavior normal.         Thought Content: Thought content normal.         Judgment: Judgment normal.         I saw the patient with a face mask, gloves and eye protection  The patient herself was masked.  Social distancing was observed as appropriate. All COVID precautions observed.     -----------------------------------------------MEDICAL DECISION MAKING-----------------------------    IMPRESSION/PROBLEM:      Recurrent large functional cysts bilaterally. Hx hysterectomy    PLAN:     1. Ovulation suppression with OCs.  (pt denies hx breast ca, thrombophilia, or migraines with aura)  2. sprintec continuously    Diagnoses and all orders for this visit:    1. hx Peripartum cardiomyopathy (Primary)    2. hx: Bilateral ovarian cysts    3. S/P laparoscopic hysterectomy: 11/4/21    4. H/O bilateral salpingectomy    Other orders  -     norgestimate-ethinyl estradiol (Sprintec 28) 0.25-35 MG-MCG per tablet; Take 1 tablet by mouth Daily. Skip blanks, and take active pills continuously for ovulation suppression  Dispense: 84 each; Refill: 6        Pt instructed to call for results of any testing done today if she does not hear from us, and that failure to do so could result in inadequate treatment . Pt verbalized her understanding.     RTO Return in about 6 months (around 4/24/2023) for Recheck..  Instructions and precautions given.     I spent 20+ minutes caring for Talisha on this date of service. This time includes time spent by me in the following activities: preparing for the visit,  reviewing tests, obtaining and/or reviewing a separately obtained history, performing a medically appropriate examination and/or evaluation, counseling and educating the patient/family/caregiver, ordering medications, tests, or procedures, referring and communicating with other health care professionals, documenting information in the medical record, independently interpreting results and communicating that information with the patient/family/caregiver and care coordination    Elvis Andre MD  15:22 EDT  10/24/22

## 2022-11-08 ENCOUNTER — TELEPHONE (OUTPATIENT)
Dept: FAMILY MEDICINE CLINIC | Facility: CLINIC | Age: 42
End: 2022-11-08

## 2022-11-14 DIAGNOSIS — F90.0 ATTENTION DEFICIT HYPERACTIVITY DISORDER (ADHD), PREDOMINANTLY INATTENTIVE TYPE: ICD-10-CM

## 2022-11-14 NOTE — TELEPHONE ENCOUNTER
Caller: Duane Talisha    Relationship: Self    Best call back number: [phone]    Requested Prescriptions:   Requested Prescriptions     Pending Prescriptions Disp Refills   • lisdexamfetamine (Vyvanse) 30 MG capsule 30 capsule 0     Sig: Take 1 capsule by mouth Every Morning ADHD        Pharmacy where request should be sent: [facility] #[id number] [place], [place] - [address] [facility] [address] [facility] [phone] [facility] [phone]      Additional details provided by patient: PATIENT STATED SHE IS COMPLETELY OUT OF THIS MEDICATION.    Does the patient have less than a 3 day supply:  [x] Yes  [] No    Michael Pitts Rep   11/14/22 09:13 EST

## 2022-11-22 ENCOUNTER — TELEPHONE (OUTPATIENT)
Dept: FAMILY MEDICINE CLINIC | Facility: CLINIC | Age: 42
End: 2022-11-22

## 2022-11-22 DIAGNOSIS — R53.83 FATIGUE, UNSPECIFIED TYPE: ICD-10-CM

## 2022-11-22 DIAGNOSIS — F90.8 ATTENTION DEFICIT HYPERACTIVITY DISORDER (ADHD), OTHER TYPE: ICD-10-CM

## 2022-11-22 DIAGNOSIS — I10 PRIMARY HYPERTENSION: Primary | ICD-10-CM

## 2022-12-03 ENCOUNTER — APPOINTMENT (OUTPATIENT)
Dept: GENERAL RADIOLOGY | Facility: HOSPITAL | Age: 42
End: 2022-12-03

## 2022-12-03 ENCOUNTER — HOSPITAL ENCOUNTER (EMERGENCY)
Facility: HOSPITAL | Age: 42
Discharge: HOME | End: 2022-12-03
Attending: EMERGENCY MEDICINE | Admitting: EMERGENCY MEDICINE

## 2022-12-03 VITALS
HEIGHT: 62 IN | BODY MASS INDEX: 21.88 KG/M2 | TEMPERATURE: 98.5 F | OXYGEN SATURATION: 98 % | HEART RATE: 80 BPM | RESPIRATION RATE: 16 BRPM | DIASTOLIC BLOOD PRESSURE: 87 MMHG | SYSTOLIC BLOOD PRESSURE: 138 MMHG

## 2022-12-03 DIAGNOSIS — S40.012A CONTUSION OF LEFT SHOULDER, INITIAL ENCOUNTER: ICD-10-CM

## 2022-12-03 DIAGNOSIS — S16.1XXA ACUTE STRAIN OF NECK MUSCLE, INITIAL ENCOUNTER: ICD-10-CM
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
## 2022-12-03 DIAGNOSIS — M62.838 TRAPEZIUS MUSCLE SPASM: ICD-10-CM

## 2022-12-03 PROCEDURE — 99283 EMERGENCY DEPT VISIT LOW MDM: CPT

## 2022-12-03 PROCEDURE — 72050 X-RAY EXAM NECK SPINE 4/5VWS: CPT

## 2022-12-03 PROCEDURE — 73030 X-RAY EXAM OF SHOULDER: CPT

## 2022-12-03 RX ORDER — METHOCARBAMOL 750 MG/1
750 TABLET, FILM COATED ORAL ONCE
Status: COMPLETED | OUTPATIENT
Start: 2022-12-03 | End: 2022-12-03

## 2022-12-03 RX ORDER — METHOCARBAMOL 750 MG/1
750 TABLET, FILM COATED ORAL 4 TIMES DAILY
Status: DISCONTINUED | OUTPATIENT
Start: 2022-12-03 | End: 2022-12-03

## 2022-12-03 RX ORDER — IBUPROFEN 600 MG/1
600 TABLET ORAL EVERY 6 HOURS PRN
Qty: 21 TABLET | Refills: 0 | Status: SHIPPED | OUTPATIENT
Start: 2022-12-03 | End: 2023-01-24

## 2022-12-03 RX ORDER — METHOCARBAMOL 750 MG/1
750 TABLET, FILM COATED ORAL 3 TIMES DAILY PRN
Qty: 21 TABLET | Refills: 0 | Status: SHIPPED | OUTPATIENT
Start: 2022-12-03 | End: 2022-12-28

## 2022-12-03 RX ORDER — IBUPROFEN 800 MG/1
800 TABLET ORAL ONCE
Status: COMPLETED | OUTPATIENT
Start: 2022-12-03 | End: 2022-12-03

## 2022-12-03 RX ADMIN — METHOCARBAMOL TABLETS 750 MG: 750 TABLET, COATED ORAL at 23:01

## 2022-12-03 RX ADMIN — IBUPROFEN 800 MG: 800 TABLET, FILM COATED ORAL at 23:02
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
## 2022-12-04 NOTE — DISCHARGE INSTRUCTIONS
Recommend taking the medications prescribed as needed for pain and muscle spasm.  I would not take them if they are not needed.  Follow-up with your family physician in a week's time for repeat evaluation.  Return to the emergency department should you develop worse symptoms, new symptoms that were not addressed on today's exam, or should she have any further concerns.
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
## 2022-12-09 ENCOUNTER — OFFICE VISIT (OUTPATIENT)
Dept: FAMILY MEDICINE CLINIC | Facility: CLINIC | Age: 42
End: 2022-12-09

## 2022-12-09 VITALS
SYSTOLIC BLOOD PRESSURE: 126 MMHG | BODY MASS INDEX: 22.45 KG/M2 | HEART RATE: 98 BPM | WEIGHT: 122 LBS | DIASTOLIC BLOOD PRESSURE: 74 MMHG | RESPIRATION RATE: 18 BRPM | OXYGEN SATURATION: 98 % | TEMPERATURE: 98 F | HEIGHT: 62 IN

## 2022-12-09 DIAGNOSIS — I10 PRIMARY HYPERTENSION: Primary | ICD-10-CM

## 2022-12-09 DIAGNOSIS — F90.8 ATTENTION DEFICIT HYPERACTIVITY DISORDER (ADHD), OTHER TYPE: ICD-10-CM

## 2022-12-09 DIAGNOSIS — B37.0 THRUSH: ICD-10-CM

## 2022-12-09 DIAGNOSIS — R53.83 FATIGUE, UNSPECIFIED TYPE: ICD-10-CM

## 2022-12-09 PROCEDURE — 99213 OFFICE O/P EST LOW 20 MIN: CPT | Performed by: FAMILY MEDICINE
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
## 2022-12-16 LAB
ALBUMIN SERPL-MCNC: 4 G/DL (ref 3.5–5.2)
ALBUMIN/GLOB SERPL: 1.4 G/DL
ALP SERPL-CCNC: 214 U/L (ref 39–117)
ALT SERPL W P-5'-P-CCNC: 36 U/L (ref 1–33)
ANION GAP SERPL CALCULATED.3IONS-SCNC: 5 MMOL/L (ref 5–15)
AST SERPL-CCNC: 40 U/L (ref 1–32)
BACTERIA UR QL AUTO: ABNORMAL /HPF
BASOPHILS # BLD AUTO: 0.03 10*3/MM3 (ref 0–0.2)
BASOPHILS NFR BLD AUTO: 0.2 % (ref 0–1.5)
BILIRUB SERPL-MCNC: 0.2 MG/DL (ref 0–1.2)
BILIRUB UR QL STRIP: NEGATIVE
BUN SERPL-MCNC: 5 MG/DL (ref 6–20)
BUN/CREAT SERPL: 9.1 (ref 7–25)
CALCIUM SPEC-SCNC: 8.6 MG/DL (ref 8.6–10.5)
CHLORIDE SERPL-SCNC: 104 MMOL/L (ref 98–107)
CLARITY UR: CLEAR
CO2 SERPL-SCNC: 27 MMOL/L (ref 22–29)
COLOR UR: YELLOW
CREAT SERPL-MCNC: 0.55 MG/DL (ref 0.57–1)
DEPRECATED RDW RBC AUTO: 39.6 FL (ref 37–54)
EGFRCR SERPLBLD CKD-EPI 2021: 118.3 ML/MIN/1.73
EOSINOPHIL # BLD AUTO: 0.01 10*3/MM3 (ref 0–0.4)
EOSINOPHIL NFR BLD AUTO: 0.1 % (ref 0.3–6.2)
ERYTHROCYTE [DISTWIDTH] IN BLOOD BY AUTOMATED COUNT: 11.6 % (ref 12.3–15.4)
GLOBULIN UR ELPH-MCNC: 2.8 GM/DL
GLUCOSE SERPL-MCNC: 108 MG/DL (ref 65–99)
GLUCOSE UR STRIP-MCNC: NEGATIVE MG/DL
HCT VFR BLD AUTO: 36.1 % (ref 34–46.6)
HGB BLD-MCNC: 12.4 G/DL (ref 12–15.9)
HGB UR QL STRIP.AUTO: NEGATIVE
HOLD SPECIMEN: NORMAL
HOLD SPECIMEN: NORMAL
HYALINE CASTS UR QL AUTO: ABNORMAL /LPF
IMM GRANULOCYTES # BLD AUTO: 0.09 10*3/MM3 (ref 0–0.05)
IMM GRANULOCYTES NFR BLD AUTO: 0.6 % (ref 0–0.5)
KETONES UR QL STRIP: NEGATIVE
LEUKOCYTE ESTERASE UR QL STRIP.AUTO: ABNORMAL
LIPASE SERPL-CCNC: 24 U/L (ref 13–60)
LYMPHOCYTES # BLD AUTO: 1.33 10*3/MM3 (ref 0.7–3.1)
LYMPHOCYTES NFR BLD AUTO: 8.9 % (ref 19.6–45.3)
MCH RBC QN AUTO: 31.4 PG (ref 26.6–33)
MCHC RBC AUTO-ENTMCNC: 34.3 G/DL (ref 31.5–35.7)
MCV RBC AUTO: 91.4 FL (ref 79–97)
MONOCYTES # BLD AUTO: 0.47 10*3/MM3 (ref 0.1–0.9)
MONOCYTES NFR BLD AUTO: 3.2 % (ref 5–12)
NEUTROPHILS NFR BLD AUTO: 12.99 10*3/MM3 (ref 1.7–7)
NEUTROPHILS NFR BLD AUTO: 87 % (ref 42.7–76)
NITRITE UR QL STRIP: NEGATIVE
NRBC BLD AUTO-RTO: 0 /100 WBC (ref 0–0.2)
PH UR STRIP.AUTO: >=9 [PH] (ref 5–8)
PLATELET # BLD AUTO: 392 10*3/MM3 (ref 140–450)
PMV BLD AUTO: 9.3 FL (ref 6–12)
POTASSIUM SERPL-SCNC: 3.9 MMOL/L (ref 3.5–5.2)
PROT SERPL-MCNC: 6.8 G/DL (ref 6–8.5)
PROT UR QL STRIP: NEGATIVE
RBC # BLD AUTO: 3.95 10*6/MM3 (ref 3.77–5.28)
RBC # UR STRIP: ABNORMAL /HPF
REF LAB TEST METHOD: ABNORMAL
SODIUM SERPL-SCNC: 136 MMOL/L (ref 136–145)
SP GR UR STRIP: 1.02 (ref 1–1.03)
SQUAMOUS #/AREA URNS HPF: ABNORMAL /HPF
UROBILINOGEN UR QL STRIP: ABNORMAL
WBC # UR STRIP: ABNORMAL /HPF
WBC NRBC COR # BLD: 14.92 10*3/MM3 (ref 3.4–10.8)
WHOLE BLOOD HOLD COAG: NORMAL
WHOLE BLOOD HOLD SPECIMEN: NORMAL

## 2022-12-16 PROCEDURE — 83690 ASSAY OF LIPASE: CPT | Performed by: EMERGENCY MEDICINE

## 2022-12-16 PROCEDURE — 99285 EMERGENCY DEPT VISIT HI MDM: CPT

## 2022-12-16 PROCEDURE — 81003 URINALYSIS AUTO W/O SCOPE: CPT | Performed by: EMERGENCY MEDICINE

## 2022-12-16 PROCEDURE — 85025 COMPLETE CBC W/AUTO DIFF WBC: CPT | Performed by: EMERGENCY MEDICINE

## 2022-12-16 PROCEDURE — 81001 URINALYSIS AUTO W/SCOPE: CPT | Performed by: EMERGENCY MEDICINE

## 2022-12-16 PROCEDURE — 80053 COMPREHEN METABOLIC PANEL: CPT | Performed by: EMERGENCY MEDICINE

## 2022-12-16 RX ORDER — SODIUM CHLORIDE 0.9 % (FLUSH) 0.9 %
10 SYRINGE (ML) INJECTION AS NEEDED
Status: DISCONTINUED | OUTPATIENT
Start: 2022-12-16 | End: 2022-12-20 | Stop reason: HOSPADM

## 2022-12-17 ENCOUNTER — APPOINTMENT (OUTPATIENT)
Dept: CT IMAGING | Facility: HOSPITAL | Age: 42
DRG: 392 | End: 2022-12-17
Payer: MEDICAID

## 2022-12-17 ENCOUNTER — INPATIENT HOSPITAL (OUTPATIENT)
Dept: URBAN - METROPOLITAN AREA HOSPITAL 113 | Facility: HOSPITAL | Age: 42
End: 2022-12-17
Payer: MEDICAID

## 2022-12-17 ENCOUNTER — HOSPITAL ENCOUNTER (INPATIENT)
Facility: HOSPITAL | Age: 42
LOS: 3 days | Discharge: HOME | DRG: 392 | End: 2022-12-20
Attending: EMERGENCY MEDICINE | Admitting: INTERNAL MEDICINE
Payer: MEDICAID

## 2022-12-17 DIAGNOSIS — R74.8 ABNORMAL LEVELS OF OTHER SERUM ENZYMES: ICD-10-CM

## 2022-12-17 DIAGNOSIS — K52.9 ENTERITIS: ICD-10-CM

## 2022-12-17 DIAGNOSIS — R10.9 UNSPECIFIED ABDOMINAL PAIN: ICD-10-CM

## 2022-12-17 DIAGNOSIS — R93.5 ABNORMAL CT OF THE ABDOMEN: ICD-10-CM

## 2022-12-17 DIAGNOSIS — D72.829 LEUKOCYTOSIS, UNSPECIFIED TYPE: ICD-10-CM

## 2022-12-17 DIAGNOSIS — R10.84 GENERALIZED ABDOMINAL PAIN: Primary | ICD-10-CM

## 2022-12-17 DIAGNOSIS — D72.829 ELEVATED WHITE BLOOD CELL COUNT, UNSPECIFIED: ICD-10-CM

## 2022-12-17 DIAGNOSIS — R93.3 ABNORMAL FINDINGS ON DIAGNOSTIC IMAGING OF OTHER PARTS OF DI: ICD-10-CM

## 2022-12-17 LAB
ANION GAP SERPL CALCULATED.3IONS-SCNC: 4.9 MMOL/L (ref 5–15)
BASOPHILS # BLD AUTO: 0.02 10*3/MM3 (ref 0–0.2)
BASOPHILS NFR BLD AUTO: 0.2 % (ref 0–1.5)
BUN SERPL-MCNC: 4 MG/DL (ref 6–20)
BUN/CREAT SERPL: 7.8 (ref 7–25)
CALCIUM SPEC-SCNC: 8.3 MG/DL (ref 8.6–10.5)
CHLORIDE SERPL-SCNC: 106 MMOL/L (ref 98–107)
CO2 SERPL-SCNC: 29.1 MMOL/L (ref 22–29)
CREAT SERPL-MCNC: 0.51 MG/DL (ref 0.57–1)
D-LACTATE SERPL-SCNC: 0.5 MMOL/L (ref 0.5–2)
DEPRECATED RDW RBC AUTO: 41.1 FL (ref 37–54)
EGFRCR SERPLBLD CKD-EPI 2021: 120.4 ML/MIN/1.73
EOSINOPHIL # BLD AUTO: 0.03 10*3/MM3 (ref 0–0.4)
EOSINOPHIL NFR BLD AUTO: 0.3 % (ref 0.3–6.2)
ERYTHROCYTE [DISTWIDTH] IN BLOOD BY AUTOMATED COUNT: 12 % (ref 12.3–15.4)
GLUCOSE SERPL-MCNC: 81 MG/DL (ref 65–99)
HCT VFR BLD AUTO: 32.4 % (ref 34–46.6)
HGB BLD-MCNC: 11.1 G/DL (ref 12–15.9)
IMM GRANULOCYTES # BLD AUTO: 0.05 10*3/MM3 (ref 0–0.05)
IMM GRANULOCYTES NFR BLD AUTO: 0.5 % (ref 0–0.5)
LYMPHOCYTES # BLD AUTO: 1.96 10*3/MM3 (ref 0.7–3.1)
LYMPHOCYTES NFR BLD AUTO: 19.2 % (ref 19.6–45.3)
MCH RBC QN AUTO: 32.5 PG (ref 26.6–33)
MCHC RBC AUTO-ENTMCNC: 34.3 G/DL (ref 31.5–35.7)
MCV RBC AUTO: 94.7 FL (ref 79–97)
MONOCYTES # BLD AUTO: 0.51 10*3/MM3 (ref 0.1–0.9)
MONOCYTES NFR BLD AUTO: 5 % (ref 5–12)
NEUTROPHILS NFR BLD AUTO: 7.64 10*3/MM3 (ref 1.7–7)
NEUTROPHILS NFR BLD AUTO: 74.8 % (ref 42.7–76)
NRBC BLD AUTO-RTO: 0 /100 WBC (ref 0–0.2)
PLATELET # BLD AUTO: 329 10*3/MM3 (ref 140–450)
PMV BLD AUTO: 9.4 FL (ref 6–12)
POTASSIUM SERPL-SCNC: 3.7 MMOL/L (ref 3.5–5.2)
RBC # BLD AUTO: 3.42 10*6/MM3 (ref 3.77–5.28)
SODIUM SERPL-SCNC: 140 MMOL/L (ref 136–145)
WBC NRBC COR # BLD: 10.21 10*3/MM3 (ref 3.4–10.8)

## 2022-12-17 PROCEDURE — 25010000002 HYDROMORPHONE PER 4 MG: Performed by: INTERNAL MEDICINE

## 2022-12-17 PROCEDURE — 74177 CT ABD & PELVIS W/CONTRAST: CPT

## 2022-12-17 PROCEDURE — G0378 HOSPITAL OBSERVATION PER HR: HCPCS

## 2022-12-17 PROCEDURE — 80048 BASIC METABOLIC PNL TOTAL CA: CPT | Performed by: NURSE PRACTITIONER

## 2022-12-17 PROCEDURE — 25010000002 IOPAMIDOL 61 % SOLUTION: Performed by: EMERGENCY MEDICINE

## 2022-12-17 PROCEDURE — 25010000002 HYDROMORPHONE PER 4 MG: Performed by: EMERGENCY MEDICINE

## 2022-12-17 PROCEDURE — 25010000002 ONDANSETRON PER 1 MG: Performed by: EMERGENCY MEDICINE

## 2022-12-17 PROCEDURE — 85025 COMPLETE CBC W/AUTO DIFF WBC: CPT | Performed by: NURSE PRACTITIONER

## 2022-12-17 PROCEDURE — 99221 1ST HOSP IP/OBS SF/LOW 40: CPT | Performed by: NURSE PRACTITIONER

## 2022-12-17 PROCEDURE — 83605 ASSAY OF LACTIC ACID: CPT | Performed by: EMERGENCY MEDICINE

## 2022-12-17 RX ORDER — HYDROMORPHONE HYDROCHLORIDE 1 MG/ML
0.5 INJECTION, SOLUTION INTRAMUSCULAR; INTRAVENOUS; SUBCUTANEOUS ONCE
Status: COMPLETED | OUTPATIENT
Start: 2022-12-17 | End: 2022-12-17

## 2022-12-17 RX ORDER — ONDANSETRON 2 MG/ML
4 INJECTION INTRAMUSCULAR; INTRAVENOUS ONCE
Status: COMPLETED | OUTPATIENT
Start: 2022-12-17 | End: 2022-12-17

## 2022-12-17 RX ORDER — PANTOPRAZOLE SODIUM 40 MG/1
40 TABLET, DELAYED RELEASE ORAL EVERY MORNING
Status: DISCONTINUED | OUTPATIENT
Start: 2022-12-17 | End: 2022-12-20 | Stop reason: HOSPADM

## 2022-12-17 RX ORDER — SODIUM CHLORIDE 0.9 % (FLUSH) 0.9 %
10 SYRINGE (ML) INJECTION EVERY 12 HOURS SCHEDULED
Status: DISCONTINUED | OUTPATIENT
Start: 2022-12-17 | End: 2022-12-20 | Stop reason: HOSPADM

## 2022-12-17 RX ORDER — NITROGLYCERIN 0.4 MG/1
0.4 TABLET SUBLINGUAL
Status: DISCONTINUED | OUTPATIENT
Start: 2022-12-17 | End: 2022-12-20 | Stop reason: HOSPADM

## 2022-12-17 RX ORDER — LISINOPRIL 20 MG/1
20 TABLET ORAL DAILY
Status: DISCONTINUED | OUTPATIENT
Start: 2022-12-17 | End: 2022-12-20 | Stop reason: HOSPADM

## 2022-12-17 RX ORDER — SODIUM CHLORIDE 9 MG/ML
40 INJECTION, SOLUTION INTRAVENOUS AS NEEDED
Status: DISCONTINUED | OUTPATIENT
Start: 2022-12-17 | End: 2022-12-20 | Stop reason: HOSPADM

## 2022-12-17 RX ORDER — SODIUM CHLORIDE 0.9 % (FLUSH) 0.9 %
10 SYRINGE (ML) INJECTION AS NEEDED
Status: DISCONTINUED | OUTPATIENT
Start: 2022-12-17 | End: 2022-12-20 | Stop reason: HOSPADM

## 2022-12-17 RX ORDER — SODIUM CHLORIDE 9 MG/ML
100 INJECTION, SOLUTION INTRAVENOUS CONTINUOUS
Status: DISCONTINUED | OUTPATIENT
Start: 2022-12-17 | End: 2022-12-20 | Stop reason: HOSPADM

## 2022-12-17 RX ORDER — ONDANSETRON 2 MG/ML
4 INJECTION INTRAMUSCULAR; INTRAVENOUS EVERY 6 HOURS PRN
Status: DISCONTINUED | OUTPATIENT
Start: 2022-12-17 | End: 2022-12-20 | Stop reason: HOSPADM

## 2022-12-17 RX ORDER — CYCLOBENZAPRINE HCL 10 MG
10 TABLET ORAL 3 TIMES DAILY PRN
Status: DISCONTINUED | OUTPATIENT
Start: 2022-12-17 | End: 2022-12-20 | Stop reason: HOSPADM

## 2022-12-17 RX ORDER — HYDROMORPHONE HYDROCHLORIDE 1 MG/ML
0.5 INJECTION, SOLUTION INTRAMUSCULAR; INTRAVENOUS; SUBCUTANEOUS EVERY 4 HOURS PRN
Status: DISCONTINUED | OUTPATIENT
Start: 2022-12-17 | End: 2022-12-19

## 2022-12-17 RX ADMIN — HYDROMORPHONE HYDROCHLORIDE 0.5 MG: 1 INJECTION, SOLUTION INTRAMUSCULAR; INTRAVENOUS; SUBCUTANEOUS at 01:22

## 2022-12-17 RX ADMIN — Medication 10 ML: at 20:40

## 2022-12-17 RX ADMIN — IOPAMIDOL 85 ML: 612 INJECTION, SOLUTION INTRAVENOUS at 01:59

## 2022-12-17 RX ADMIN — ONDANSETRON 4 MG: 2 INJECTION INTRAMUSCULAR; INTRAVENOUS at 01:21

## 2022-12-17 RX ADMIN — Medication 10 ML: at 08:31

## 2022-12-17 RX ADMIN — HYDROMORPHONE HYDROCHLORIDE 0.5 MG: 1 INJECTION, SOLUTION INTRAMUSCULAR; INTRAVENOUS; SUBCUTANEOUS at 02:56

## 2022-12-17 RX ADMIN — HYDROMORPHONE HYDROCHLORIDE 0.5 MG: 1 INJECTION, SOLUTION INTRAMUSCULAR; INTRAVENOUS; SUBCUTANEOUS at 12:31

## 2022-12-17 RX ADMIN — Medication 10 ML: at 08:32

## 2022-12-17 RX ADMIN — SODIUM CHLORIDE 1000 ML: 9 INJECTION, SOLUTION INTRAVENOUS at 01:22

## 2022-12-17 RX ADMIN — CYCLOBENZAPRINE 10 MG: 10 TABLET, FILM COATED ORAL at 15:47

## 2022-12-17 RX ADMIN — HYDROMORPHONE HYDROCHLORIDE 0.5 MG: 1 INJECTION, SOLUTION INTRAMUSCULAR; INTRAVENOUS; SUBCUTANEOUS at 08:28

## 2022-12-17 RX ADMIN — LISINOPRIL 20 MG: 20 TABLET ORAL at 12:07

## 2022-12-17 RX ADMIN — HYDROMORPHONE HYDROCHLORIDE 0.5 MG: 1 INJECTION, SOLUTION INTRAMUSCULAR; INTRAVENOUS; SUBCUTANEOUS at 20:47

## 2022-12-17 RX ADMIN — SERTRALINE HYDROCHLORIDE 50 MG: 50 TABLET, FILM COATED ORAL at 12:06

## 2022-12-17 RX ADMIN — SODIUM CHLORIDE 100 ML/HR: 9 INJECTION, SOLUTION INTRAVENOUS at 20:40

## 2022-12-17 RX ADMIN — HYDROMORPHONE HYDROCHLORIDE 0.5 MG: 1 INJECTION, SOLUTION INTRAMUSCULAR; INTRAVENOUS; SUBCUTANEOUS at 16:41

## 2022-12-17 RX ADMIN — PANTOPRAZOLE SODIUM 40 MG: 40 TABLET, DELAYED RELEASE ORAL at 12:07

## 2022-12-17 RX ADMIN — SODIUM CHLORIDE 100 ML/HR: 9 INJECTION, SOLUTION INTRAVENOUS at 09:19

## 2022-12-17 NOTE — ED NOTES
Nursing report ED to floor  Talisha Zepeda  42 y.o.  female    HPI :   Chief Complaint   Patient presents with    Abdominal Pain       Admitting doctor:   Graeme Howard MD    Admitting diagnosis:   The primary encounter diagnosis was Generalized abdominal pain. Diagnoses of Enteritis, Leukocytosis, unspecified type, and Abnormal CT of the abdomen were also pertinent to this visit.    Code status:   Current Code Status       Date Active Code Status Order ID Comments User Context       12/17/2022 0456 CPR (Attempt to Resuscitate) [id number]  Brittany Kessler APRN ED        Question Answer    Code Status (Patient has no pulse and is not breathing) CPR (Attempt to Resuscitate)    Medical Interventions (Patient has pulse or is breathing) Full Support                    Allergies:   Patient has no known allergies.    Isolation:   No active isolations    Intake and Output    Intake/Output Summary (Last 24 hours) at 12/17/2022 1435  Last data filed at 12/17/2022 0356  Gross per 24 hour   Intake 1000 ml   Output --   Net 1000 ml       Weight:       12/17/22  0910   Weight: 66.2 kg (146 lb)       Most recent vitals:   Vitals:    12/17/22 1011 12/17/22 1043 12/17/22 1134 12/17/22 1430   BP: 116/72 120/74 126/81 122/71   BP Location:    Left arm   Patient Position:    Lying   Pulse: 63 63 67 67   Resp:   18 16   Temp:    97.6 °F (36.4 °C)   TempSrc:    Tympanic   SpO2: 93% 94% 97% 98%   Weight:       Height:           Active LDAs/IV Access:   Lines, Drains & Airways       Active LDAs       Name Placement date Placement time Site Days    Peripheral IV 12/17/22 0118 Anterior;Proximal;Right Forearm 12/17/22 0118  Forearm  less than 1                    Labs (abnormal labs have a star):   Labs Reviewed   COMPREHENSIVE METABOLIC PANEL - Abnormal; Notable for the following components:       Result Value    Glucose 108 (*)     BUN 5 (*)     Creatinine 0.55 (*)     ALT (SGPT) 36 (*)     AST (SGOT) 40 (*)     Alkaline  Phosphatase 214 (*)     All other components within normal limits    Narrative:     GFR Normal >60  Chronic Kidney Disease <60  Kidney Failure <15     URINALYSIS W/ MICROSCOPIC IF INDICATED (NO CULTURE) - Abnormal; Notable for the following components:    pH, UA >=9.0 (*)     Leuk Esterase, UA Trace (*)     All other components within normal limits   CBC WITH AUTO DIFFERENTIAL - Abnormal; Notable for the following components:    WBC 14.92 (*)     RDW 11.6 (*)     Neutrophil % 87.0 (*)     Lymphocyte % 8.9 (*)     Monocyte % 3.2 (*)     Eosinophil % 0.1 (*)     Immature Grans % 0.6 (*)     Neutrophils, Absolute 12.99 (*)     Immature Grans, Absolute 0.09 (*)     All other components within normal limits   URINALYSIS, MICROSCOPIC ONLY - Abnormal; Notable for the following components:    Bacteria, UA Trace (*)     All other components within normal limits   BASIC METABOLIC PANEL - Abnormal; Notable for the following components:    BUN 4 (*)     Creatinine 0.51 (*)     CO2 29.1 (*)     Calcium 8.3 (*)     Anion Gap 4.9 (*)     All other components within normal limits    Narrative:     GFR Normal >60  Chronic Kidney Disease <60  Kidney Failure <15     CBC WITH AUTO DIFFERENTIAL - Abnormal; Notable for the following components:    RBC 3.42 (*)     Hemoglobin 11.1 (*)     Hematocrit 32.4 (*)     RDW 12.0 (*)     Lymphocyte % 19.2 (*)     Neutrophils, Absolute 7.64 (*)     All other components within normal limits   LIPASE - Normal   LACTIC ACID, PLASMA - Normal   RAINBOW DRAW    Narrative:     The following orders were created for panel order [place] Draw.  Procedure                               Abnormality         Status                     ---------                               -----------         ------                     Green Top (Gel)[[id number]]                                  Final result               Lavender Top[[id number]]                                     Final result               Gold Top -  SST[[id number]]                                   Final result               Light Blue Top[[id number]]                                   Final result                 Please view results for these tests on the individual orders.   CBC AND DIFFERENTIAL    Narrative:     The following orders were created for panel order CBC & Differential.  Procedure                               Abnormality         Status                     ---------                               -----------         ------                     CBC Auto Differential[[id number]]        Abnormal            Final result                 Please view results for these tests on the individual orders.   GREEN TOP   LAVENDER TOP   GOLD TOP - SST   LIGHT BLUE TOP       EKG:   No orders to display       Meds given in ED:   Medications   sodium chloride 0.9 % flush 10 mL (has no administration in time range)   sodium chloride 0.9 % flush 10 mL (10 mL Intravenous Given 12/17/22 0832)   sodium chloride 0.9 % flush 10 mL (10 mL Intravenous Given 12/17/22 0831)   sodium chloride 0.9 % infusion 40 mL (has no administration in time range)   nitroglycerin (NITROSTAT) SL tablet 0.4 mg (has no administration in time range)   ondansetron (ZOFRAN) injection 4 mg (has no administration in time range)   HYDROmorphone (DILAUDID) injection 0.5 mg (0.5 mg Intravenous Given 12/17/22 1231)   sodium chloride 0.9 % infusion (100 mL/hr Intravenous Currently Infusing 12/17/22 1430)   lisinopril (PRINIVIL,ZESTRIL) tablet 20 mg (20 mg Oral Given 12/17/22 1207)   PATIENT SUPPLIED MEDICATION (1 tablet Oral Not Given 12/17/22 1208)   pantoprazole (PROTONIX) EC tablet 40 mg (40 mg Oral Given 12/17/22 1207)   sertraline (ZOLOFT) tablet 50 mg (50 mg Oral Given 12/17/22 1206)   sodium chloride 0.9 % bolus 1,000 mL (0 mL Intravenous Stopped 12/17/22 0356)   HYDROmorphone (DILAUDID) injection 0.5 mg (0.5 mg Intravenous Given 12/17/22 0122)   ondansetron (ZOFRAN) injection 4 mg (4 mg Intravenous  Given 22 0121)   iopamidol (ISOVUE-300) 61 % injection 100 mL (85 mL Intravenous Given 22 0159)   HYDROmorphone (DILAUDID) injection 0.5 mg (0.5 mg Intravenous Given 22 0256)       Imaging results:  CT Abdomen Pelvis With Contrast    Result Date: 2022  Communications: 22 02:59 Call Doctor Regarding Above results, called  Dr. Sanabria on  02:59 (-05:00) Electronically signed by Morgan Guzman MD on 22 at 0300     Ambulatory status:   - Independent    Social issues:   Social History     Socioeconomic History    Marital status: Single   Tobacco Use    Smoking status: Former     Packs/day: 0.50     Years: 13.00     Pack years: 6.50     Types: Cigarettes     Start date: 1999     Quit date: 2015     Years since quittin.9    Smokeless tobacco: Never   Vaping Use    Vaping Use: Never used   Substance and Sexual Activity    Alcohol use: Yes     Comment: few times a year    Drug use: No    Sexual activity: Yes     Partners: Male     Birth control/protection: Surgical, None       NIH Stroke Scale:         Yumiko Parry RN  22 14:35 EST

## 2022-12-17 NOTE — LETTER
December 18, 2022     Patient: Talisha Zepeda   YOB: 1980   Date of Visit: 12/16/2022       To Whom It May Concern:    Please excuse Ms. Talisha Zepeda from work beginning 12/17/2022 due to admittance in hospital and currently being treated. Thank you for your consideration.          Sincerely,          Dr. Terrance Barragan  [phone]

## 2022-12-17 NOTE — Clinical Note
Level of Care: Telemetry [5]   Diagnosis: Generalized abdominal pain [758504]   Admitting Physician: ZEFERINO PATE [[id number]]   Attending Physician: ZEFERINO PATE [[id number]]
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
## 2022-12-17 NOTE — ED TRIAGE NOTES
Patient c/o intermittent generalized abdominal pain beginning yesterday morning. Patient had a BM 12/16 and states it was normal for her. Patient denies urinary symptoms. Patient reports nausea, no vomiting. Recently had the flu

## 2022-12-17 NOTE — LETTER
December 20, 2022     Patient: Talisha Zepeda   YOB: 1980   Date of Visit: 12/16/2022       To Whom It May Concern:    It is my medical opinion that Talisha Zepeda return to work on December 26,2022. She has been admitted in hospital from 12/17 thru 12/20/2022.            Sincerely,      Dr. Osbaldo Browning MD, Hospitalist

## 2022-12-17 NOTE — ED NOTES
Patient requesting food.  Advised patient that she is currently NPO.  Patient stated, \"I do not care - I'll take even a popsicle.\"

## 2022-12-17 NOTE — ED PROVIDER NOTES
EMERGENCY DEPARTMENT ENCOUNTER    CHIEF COMPLAINT  Chief Complaint: Abdominal pain  History given by: Patient  History limited by: None  Room Number:   PMD: John Taylor MD      HPI:  Pt is a 42 y.o. female who presents complaining of generalized abdominal discomfort that has been present now for the past 2 to 3 days.  She reports that she recently has been recovering from the flu and has had a significant amount of coughing.  She reports that she first thought it could be muscular injury secondary to the coughing or lifting but the pain has persisted.  She denies any localization of the pain.  She denies fever/chills, nausea/vomiting, back pain, extremity pain, or shortness of breath.    Duration: Ongoing for the past 2 to 3 days  Onset: Gradual  Location: Generalized abdomen  Radiation: None  Quality: Dull/aching  Intensity/Severity: Moderate  Progression: Worsening  Associated Symptoms: None  Aggravating Factors: Movement of any kind or touch  Alleviating Factors: None  Previous Episodes: None  Treatment before arrival: Tylenol without relief    PAST MEDICAL HISTORY  Active Ambulatory Problems     Diagnosis Date Noted   • hx Peripartum cardiomyopathy 2018   • hx: Bilateral ovarian cysts 10/10/2018   • H/O bilateral salpingectomy 10/10/2018   • HPV in female 2018   • S/P laparoscopic hysterectomy: 2021   • Fatigue 2022   • Medication monitoring encounter 2022   • Family history of early CAD 2022   • Hypertension 2022   • ADHD 2022   • Anxiety 2022     Resolved Ambulatory Problems     Diagnosis Date Noted   • Pelvic pain in female 10/10/2018   • IUD contraception-Mirena 2018   • Menorrhagia with irregular cycle 2019   • History of 3  sections: pt denies any classical sections 2019   • History of endometrial ablation 2021   • Right ovarian cyst 2022     Past Medical History:   Diagnosis Date   • Asthma    •  Chest pain, midsternal    • Depression    • GERD (gastroesophageal reflux disease)    • History of acquired CHF (congestive heart failure)    • History of gestational hypertension    • History of transfusion    • Hx gestational diabetes    • Leukocytosis 2022   • Low back pain    • Migraines    • Personal history of cardiomyopathy        PAST SURGICAL HISTORY  Past Surgical History:   Procedure Laterality Date   •  SECTION      x3   • CHIN IMPLANT INSERTION      w/jaw surgery   • D & C HYSTEROSCOPY ENDOMETRIAL ABLATION N/A 2019    Procedure: INTRAUTERINE DEVICE REMOVAL  DIAGNOSTIC HYSTEROSCOPY, DILATATION AND CURETTAGE, NOVASURE ENDOMETRIAL ABLATION.;  Surgeon: Elvis Andre MD;  Location: [facility] OR;  Service: Obstetrics/Gynecology   • TOTAL LAPAROSCOPIC HYSTERECTOMY N/A 2021    Procedure: TOTAL LAPAROSCOPIC HYSTERECTOMY, POSSIBLE TOTAL ABDOMINAL HYSTERECTOMY;  Surgeon: Elvis Andre MD;  Location: [facility] OR;  Service: Obstetrics/Gynecology;  Laterality: N/A;   • TUBAL ABDOMINAL LIGATION         FAMILY HISTORY  Family History   Problem Relation Age of Onset   • Breast cancer Mother    • Kidney failure Mother    • Diabetes Mother    • Heart failure Mother    • Hypertension Mother    • Anxiety disorder Mother    • Cancer Mother    • Depression Mother    • Kidney disease Mother    • Asthma Father    • Sleep apnea Father    • Alcohol abuse Father    • Arthritis Father    • Heart failure Maternal Grandfather    • Malig Hyperthermia Neg Hx        SOCIAL HISTORY  Social History     Socioeconomic History   • Marital status: Single   Tobacco Use   • Smoking status: Former     Packs/day: 0.50     Years: 13.00     Pack years: 6.50     Types: Cigarettes     Start date: 1999     Quit date: 2015     Years since quittin.9   • Smokeless tobacco: Never   Vaping Use   • Vaping Use: Never used   Substance and Sexual Activity   • Alcohol use: Yes      Comment: few times a year   • Drug use: No   • Sexual activity: Yes     Partners: Male     Birth control/protection: Surgical, None       ALLERGIES  Patient has no known allergies.    REVIEW OF SYSTEMS  Review of Systems   Constitutional: Negative for fever.   HENT: Negative for sore throat.    Eyes: Negative.    Respiratory: Negative for cough and shortness of breath.    Cardiovascular: Negative for chest pain.   Gastrointestinal: Positive for abdominal pain. Negative for diarrhea and vomiting.   Genitourinary: Negative for dysuria.   Musculoskeletal: Negative for neck pain.   Skin: Negative for rash.   Allergic/Immunologic: Negative.    Neurological: Negative for weakness, numbness and headaches.   Hematological: Negative.    Psychiatric/Behavioral: Negative.    All other systems reviewed and are negative.      PHYSICAL EXAM  ED Triage Vitals [12/16/22 2006]   Temp Heart Rate Resp BP SpO2   99.4 °F (37.4 °C) 96 18 140/88 97 %      Temp src Heart Rate Source Patient Position BP Location FiO2 (%)   Tympanic Monitor Sitting Left arm --       Physical Exam  Vitals and nursing note reviewed.   Constitutional:       General: She is not in acute distress.  HENT:      Head: Normocephalic and atraumatic.   Eyes:      Extraocular Movements: EOM normal.      Pupils: Pupils are equal, round, and reactive to light.   Cardiovascular:      Rate and Rhythm: Normal rate and regular rhythm.      Heart sounds: Normal heart sounds.   Pulmonary:      Effort: Pulmonary effort is normal. No respiratory distress.      Breath sounds: Normal breath sounds.   Abdominal:      Palpations: Abdomen is soft.      Tenderness: There is generalized abdominal tenderness. There is no guarding or rebound.   Musculoskeletal:         General: No edema. Normal range of motion.      Cervical back: Normal range of motion and neck supple.   Skin:     General: Skin is warm and dry.      Findings: No rash.   Neurological:      Mental Status: She is alert and  oriented to person, place, and time.      Sensory: Sensation is intact.      Motor: Motor strength is normal.   Psychiatric:         Mood and Affect: Mood and affect normal.         LAB RESULTS  Lab Results (last 24 hours)     Procedure Component Value Units Date/Time    CBC & Differential [[id number]]  (Abnormal) Collected: 12/16/22 2020    Specimen: Blood Updated: 12/16/22 2035    Narrative:      The following orders were created for panel order CBC & Differential.  Procedure                               Abnormality         Status                     ---------                               -----------         ------                     CBC Auto Differential[[id number]]        Abnormal            Final result                 Please view results for these tests on the individual orders.    Comprehensive Metabolic Panel [[id number]]  (Abnormal) Collected: 12/16/22 2020    Specimen: Blood Updated: 12/16/22 2051     Glucose 108 mg/dL      BUN 5 mg/dL      Creatinine 0.55 mg/dL      Sodium 136 mmol/L      Potassium 3.9 mmol/L      Chloride 104 mmol/L      CO2 27.0 mmol/L      Calcium 8.6 mg/dL      Total Protein 6.8 g/dL      Albumin 4.00 g/dL      ALT (SGPT) 36 U/L      AST (SGOT) 40 U/L      Alkaline Phosphatase 214 U/L      Total Bilirubin 0.2 mg/dL      Globulin 2.8 gm/dL      A/G Ratio 1.4 g/dL      BUN/Creatinine Ratio 9.1     Anion Gap 5.0 mmol/L      eGFR 118.3 mL/min/1.73      Comment: National Kidney Foundation and American Society of Nephrology (ASN) Task Force recommended calculation based on the Chronic Kidney Disease Epidemiology Collaboration (CKD-EPI) equation refit without adjustment for race.       Narrative:      GFR Normal >60  Chronic Kidney Disease <60  Kidney Failure <15      Lipase [[id number]]  (Normal) Collected: 12/16/22 2020    Specimen: Blood Updated: 12/16/22 2051     Lipase 24 U/L     CBC Auto Differential [[id number]]  (Abnormal) Collected: 12/16/22 2020    Specimen: Blood Updated: 12/16/22  2035     WBC 14.92 10*3/mm3      RBC 3.95 10*6/mm3      Hemoglobin 12.4 g/dL      Hematocrit 36.1 %      MCV 91.4 fL      MCH 31.4 pg      MCHC 34.3 g/dL      RDW 11.6 %      RDW-SD 39.6 fl      MPV 9.3 fL      Platelets 392 10*3/mm3      Neutrophil % 87.0 %      Lymphocyte % 8.9 %      Monocyte % 3.2 %      Eosinophil % 0.1 %      Basophil % 0.2 %      Immature Grans % 0.6 %      Neutrophils, Absolute 12.99 10*3/mm3      Lymphocytes, Absolute 1.33 10*3/mm3      Monocytes, Absolute 0.47 10*3/mm3      Eosinophils, Absolute 0.01 10*3/mm3      Basophils, Absolute 0.03 10*3/mm3      Immature Grans, Absolute 0.09 10*3/mm3      nRBC 0.0 /100 WBC     Urinalysis With Microscopic If Indicated (No Culture) - Urine, Clean Catch [[id number]]  (Abnormal) Collected: 12/16/22 2029    Specimen: Urine, Clean Catch Updated: 12/16/22 2102     Color, UA Yellow     Appearance, UA Clear     pH, UA >=9.0     Specific Gravity, UA 1.022     Glucose, UA Negative     Ketones, UA Negative     Bilirubin, UA Negative     Blood, UA Negative     Protein, UA Negative     Leuk Esterase, UA Trace     Nitrite, UA Negative     Urobilinogen, UA 1.0 E.U./dL    Urinalysis, Microscopic Only - Urine, Clean Catch [[id number]]  (Abnormal) Collected: 12/16/22 2029    Specimen: Urine, Clean Catch Updated: 12/16/22 2102     RBC, UA None Seen /HPF      WBC, UA 0-2 /HPF      Bacteria, UA Trace /HPF      Squamous Epithelial Cells, UA 0-2 /HPF      Hyaline Casts, UA None Seen /LPF      Methodology Manual Light Microscopy    Lactic Acid, Plasma [[id number]]  (Normal) Collected: 12/17/22 0313    Specimen: Blood Updated: 12/17/22 0337     Lactate 0.5 mmol/L           I ordered the above labs and reviewed the results    RADIOLOGY  CT Abdomen Pelvis With Contrast   Final Result            Communications:      12 17 22 02:59 Call Doctor Regarding Above results, called  Dr. Sanabria on    12 17 02:59 (-05:00)      Electronically signed by Morgan Guzman MD on 12-17-22 at  0300           I ordered the above noted radiological studies. Interpreted by radiologist. Discussed with radiologist. Reviewed by me in PACS.       PROCEDURES  Procedures      PROGRESS AND CONSULTS  ED Course as of 12/17/22 0706   Sat Dec 17, 2022   0421 On reevaluation, the patient does remain somewhat in pain however she does report improvement of symptoms following second dose IV analgesia treatment.  I did inform her that she does have a leukocytosis with associated inflammatory changes of the small bowel on CT.  Her lactate is negative however.  Given the ongoing pain and the abnormal CT, would like to admit her to the hospital for further management and treatment.  The patient is in agreement with that plan and all questions have been answered. [BM]   0452 Case including history, physical, as well as findings discussed with KUNAL Mendez for [facility], who agrees to admit the patient to the hospital for Dr. Howard. [BM]      ED Course User Index  [BM] Sai Sanabria MD     The patient was wearing a facemask upon entrance into the room and remained in such throughout their visit.  I was wearing PPE including a facemask, eye protection, as well as gloves at any point entering the room and throughout the visit      MEDICAL DECISION MAKING  Results were reviewed/discussed with the patient and they were also made aware of online access. Pt also made aware that some labs, such as cultures, will not be resulted during ER visit and follow up with PMD is necessary.     MDM  Number of Diagnoses or Management Options  Diagnosis management comments: Differential diagnosis includes but is not limited to acute appendicitis, cholelithiasis, cholecystitis, acute diverticulitis, small bowel obstruction, gastritis/GERD, or intraperitoneal free air.       Amount and/or Complexity of Data Reviewed  Clinical lab tests: ordered and reviewed  Tests in the radiology section of CPT®: ordered and reviewed  Tests in the medicine  section of CPT®: ordered and reviewed  Decide to obtain previous medical records or to obtain history from someone other than the patient: yes  Review and summarize past medical records: yes (Upon medical records review, the patient was last seen and evaluated by her primary care provider on 12/9/2022 secondary to ongoing management of hypertension as well as generalized fatigue)           DIAGNOSIS  Final diagnoses:   Generalized abdominal pain   Enteritis   Leukocytosis, unspecified type   Abnormal CT of the abdomen       DISPOSITION  ADMISSION    Discussed treatment plan and reason for admission with pt/family and admitting physician.  Pt/family voiced understanding of the plan for admission for further testing/treatment as needed.         Latest Documented Vital Signs:  As of 07:06 EST  BP- 115/72 HR- 72 Temp- 99.4 °F (37.4 °C) (Tympanic) O2 sat- 94%         Sai Sanabria MD  12/17/22 0706
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
## 2022-12-17 NOTE — H&P
Patient Name:  Talisha Zepeda  YOB: 1980  MRN:  [id number]  Admit Date:  12/17/2022  Patient Care Team:  John Taylor MD as PCP - General (Internal Medicine)  Elvis Andre MD as Consulting Physician (Obstetrics and Gynecology)      Subjective   History Present Illness     Chief Complaint   Patient presents with   • Abdominal Pain       Ms. Zepeda is a 42 y.o.  Female with a history of peripartum cardiomyopathy, hypertension, generalized anxiety disorder, ADHD, partial hysterectomy that presents to [facility] complaining of abdominal pain and distention for 3 days. Having mild intermittent nausea, no vomiting accompanied by intermittent chills.  She recently recovered from the flu and had a lingering cough for several days.  Initially thought abdominal pain was related to coughing, however the abdominal pain worsened and continued after the cough.  It is generalized over her entire abdomen, waxing and waning and moderate to severe intensity. Describes it as sharp intense cramping.  She denies any constipation or diarrhea.  No changes in diet recently.  No medication changes.  She has not experienced any similar episodes in the past.    She has a history of hypertension which was thought to be related to taking Adderall.  She was changed to Vyvanse by her primary care provider and having blood pressure frequently monitored with plans to stop antihypertensive meds if she continues to have acceptable BP readings.  Patient also reporting history of peripartum cardiomyopathy, resolved several months after her pregnancy.    Dilaudid providing good pain relief.    Review of Systems   Constitutional: Positive for chills. Negative for fatigue and fever.   HENT: Negative for congestion, postnasal drip and rhinorrhea.    Eyes: Negative for visual disturbance.   Respiratory: Negative for cough and shortness of breath.    Gastrointestinal: Positive for abdominal distention,  abdominal pain and nausea. Negative for constipation, diarrhea and vomiting.   Endocrine: Negative for cold intolerance and heat intolerance.   Genitourinary: Negative for difficulty urinating.   Musculoskeletal: Negative for arthralgias and myalgias.   Skin: Negative for rash and wound.   Neurological: Negative for dizziness, weakness, light-headedness and numbness.        Personal History     Past Medical History:   Diagnosis Date   • ADHD    • Anxiety    • Asthma    • Chest pain, midsternal     states w/anxiety & activity, relieved w/tums, not sure if reflux or d/t anxiety   • Depression    • GERD (gastroesophageal reflux disease)    • History of acquired CHF (congestive heart failure)     with/after pregnancy   • History of gestational hypertension    • History of transfusion     after delivery   • Hx gestational diabetes    • Leukocytosis 2022   • Low back pain     right, h/o slipped disc   • Migraines    • Personal history of cardiomyopathy 2015    w/pregnancy, states treated by Dr Ortiz at [facility]     Past Surgical History:   Procedure Laterality Date   •  SECTION      x3   • CHIN IMPLANT INSERTION  2010    w/jaw surgery   • D & C HYSTEROSCOPY ENDOMETRIAL ABLATION N/A 2019    Procedure: INTRAUTERINE DEVICE REMOVAL  DIAGNOSTIC HYSTEROSCOPY, DILATATION AND CURETTAGE, NOVASURE ENDOMETRIAL ABLATION.;  Surgeon: Elvis Andre MD;  Location: [facility] OR;  Service: Obstetrics/Gynecology   • TOTAL LAPAROSCOPIC HYSTERECTOMY N/A 2021    Procedure: TOTAL LAPAROSCOPIC HYSTERECTOMY, POSSIBLE TOTAL ABDOMINAL HYSTERECTOMY;  Surgeon: Elvis Andre MD;  Location: [facility] OR;  Service: Obstetrics/Gynecology;  Laterality: N/A;   • TUBAL ABDOMINAL LIGATION       Family History   Problem Relation Age of Onset   • Breast cancer Mother    • Kidney failure Mother    • Diabetes Mother    • Heart failure Mother    • Hypertension Mother    • Anxiety disorder Mother    • Cancer  Mother    • Depression Mother    • Kidney disease Mother    • Asthma Father    • Sleep apnea Father    • Alcohol abuse Father    • Arthritis Father    • Heart failure Maternal Grandfather    • Malig Hyperthermia Neg Hx      Social History     Tobacco Use   • Smoking status: Former     Packs/day: 0.50     Years: 13.00     Pack years: 6.50     Types: Cigarettes     Start date: 1999     Quit date: 2015     Years since quittin.9   • Smokeless tobacco: Never   Vaping Use   • Vaping Use: Never used   Substance Use Topics   • Alcohol use: Yes     Comment: few times a year   • Drug use: No     No current facility-administered medications on file prior to encounter.     Current Outpatient Medications on File Prior to Encounter   Medication Sig Dispense Refill   • ibuprofen (ADVIL,MOTRIN) 600 MG tablet Take 1 tablet by mouth Every 6 (Six) Hours As Needed for Moderate Pain. 21 tablet 0   • lisdexamfetamine (Vyvanse) 30 MG capsule Take 1 capsule by mouth Every Morning ADHD 30 capsule 0   • lisinopril (PRINIVIL,ZESTRIL) 20 MG tablet TAKE 1 TABLET BY MOUTH DAILY 30 tablet 3   • methocarbamol (ROBAXIN) 750 MG tablet Take 1 tablet by mouth 3 (Three) Times a Day As Needed for Muscle Spasms. 21 tablet 0   • norgestimate-ethinyl estradiol (Sprintec 28) 0.25-35 MG-MCG per tablet Take 1 tablet by mouth Daily. Skip blanks, and take active pills continuously for ovulation suppression 84 each 6   • nystatin (MYCOSTATIN) 100,000 unit/mL suspension Swish and spit 5 mL 4 (Four) Times a Day. For next week, until thrush improves. 473 mL 0   • omeprazole (priLOSEC) 40 MG capsule Take 1 capsule by mouth Daily. 90 capsule 1   • sertraline (ZOLOFT) 50 MG tablet Take 1 tablet by mouth Daily. 30 tablet 3     No Known Allergies    Objective    Objective     Vital Signs  Temp:  [99.4 °F (37.4 °C)] 99.4 °F (37.4 °C)  Heart Rate:  [63-96] 63  Resp:  [18] 18  BP: (100-144)/(72-96) 120/74  SpO2:  [93 %-100 %] 94 %  on   ;   Device (Oxygen  Therapy): room air  Body mass index is 26.7 kg/m².    Physical Exam  Constitutional:       General: She is not in acute distress.     Appearance: She is normal weight. She is not ill-appearing.   HENT:      Head: Normocephalic and atraumatic.      Mouth/Throat:      Mouth: Mucous membranes are moist.   Eyes:      Extraocular Movements: Extraocular movements intact.      Pupils: Pupils are equal, round, and reactive to light.   Cardiovascular:      Rate and Rhythm: Normal rate and regular rhythm.      Pulses: Normal pulses.      Heart sounds: Normal heart sounds.   Pulmonary:      Effort: Pulmonary effort is normal. No respiratory distress.      Breath sounds: Normal breath sounds.   Abdominal:      General: Bowel sounds are normal. There is distension.      Palpations: Abdomen is soft.      Tenderness: There is abdominal tenderness.   Musculoskeletal:         General: No swelling or tenderness. Normal range of motion.      Cervical back: Normal range of motion and neck supple.   Skin:     General: Skin is warm and dry.   Neurological:      General: No focal deficit present.      Mental Status: She is oriented to person, place, and time.   Psychiatric:         Mood and Affect: Mood normal.         Results Review:  I reviewed the patient's new clinical results.      Lab Results (last 24 hours)     Procedure Component Value Units Date/Time    CBC & Differential [[id number]]  (Abnormal) Collected: 12/16/22 2020    Specimen: Blood Updated: 12/16/22 2035    Narrative:      The following orders were created for panel order CBC & Differential.  Procedure                               Abnormality         Status                     ---------                               -----------         ------                     CBC Auto Differential[[id number]]        Abnormal            Final result                 Please view results for these tests on the individual orders.    Comprehensive Metabolic Panel [[id number]]  (Abnormal)  Collected: 12/16/22 2020    Specimen: Blood Updated: 12/16/22 2051     Glucose 108 mg/dL      BUN 5 mg/dL      Creatinine 0.55 mg/dL      Sodium 136 mmol/L      Potassium 3.9 mmol/L      Chloride 104 mmol/L      CO2 27.0 mmol/L      Calcium 8.6 mg/dL      Total Protein 6.8 g/dL      Albumin 4.00 g/dL      ALT (SGPT) 36 U/L      AST (SGOT) 40 U/L      Alkaline Phosphatase 214 U/L      Total Bilirubin 0.2 mg/dL      Globulin 2.8 gm/dL      A/G Ratio 1.4 g/dL      BUN/Creatinine Ratio 9.1     Anion Gap 5.0 mmol/L      eGFR 118.3 mL/min/1.73      Comment: National Kidney Foundation and American Society of Nephrology (ASN) Task Force recommended calculation based on the Chronic Kidney Disease Epidemiology Collaboration (CKD-EPI) equation refit without adjustment for race.       Narrative:      GFR Normal >60  Chronic Kidney Disease <60  Kidney Failure <15      Lipase [[id number]]  (Normal) Collected: 12/16/22 2020    Specimen: Blood Updated: 12/16/22 2051     Lipase 24 U/L     CBC Auto Differential [[id number]]  (Abnormal) Collected: 12/16/22 2020    Specimen: Blood Updated: 12/16/22 2035     WBC 14.92 10*3/mm3      RBC 3.95 10*6/mm3      Hemoglobin 12.4 g/dL      Hematocrit 36.1 %      MCV 91.4 fL      MCH 31.4 pg      MCHC 34.3 g/dL      RDW 11.6 %      RDW-SD 39.6 fl      MPV 9.3 fL      Platelets 392 10*3/mm3      Neutrophil % 87.0 %      Lymphocyte % 8.9 %      Monocyte % 3.2 %      Eosinophil % 0.1 %      Basophil % 0.2 %      Immature Grans % 0.6 %      Neutrophils, Absolute 12.99 10*3/mm3      Lymphocytes, Absolute 1.33 10*3/mm3      Monocytes, Absolute 0.47 10*3/mm3      Eosinophils, Absolute 0.01 10*3/mm3      Basophils, Absolute 0.03 10*3/mm3      Immature Grans, Absolute 0.09 10*3/mm3      nRBC 0.0 /100 WBC     Urinalysis With Microscopic If Indicated (No Culture) - Urine, Clean Catch [[id number]]  (Abnormal) Collected: 12/16/22 2029    Specimen: Urine, Clean Catch Updated: 12/16/22 2102     Color, UA Yellow      Appearance, UA Clear     pH, UA >=9.0     Specific Gravity, UA 1.022     Glucose, UA Negative     Ketones, UA Negative     Bilirubin, UA Negative     Blood, UA Negative     Protein, UA Negative     Leuk Esterase, UA Trace     Nitrite, UA Negative     Urobilinogen, UA 1.0 E.U./dL    Urinalysis, Microscopic Only - Urine, Clean Catch [[id number]]  (Abnormal) Collected: 12/16/22 2029    Specimen: Urine, Clean Catch Updated: 12/16/22 2102     RBC, UA None Seen /HPF      WBC, UA 0-2 /HPF      Bacteria, UA Trace /HPF      Squamous Epithelial Cells, UA 0-2 /HPF      Hyaline Casts, UA None Seen /LPF      Methodology Manual Light Microscopy    Lactic Acid, Plasma [[id number]]  (Normal) Collected: 12/17/22 0313    Specimen: Blood Updated: 12/17/22 0337     Lactate 0.5 mmol/L     Basic Metabolic Panel [[id number]]  (Abnormal) Collected: 12/17/22 0828    Specimen: Blood Updated: 12/17/22 0912     Glucose 81 mg/dL      BUN 4 mg/dL      Creatinine 0.51 mg/dL      Sodium 140 mmol/L      Potassium 3.7 mmol/L      Chloride 106 mmol/L      CO2 29.1 mmol/L      Calcium 8.3 mg/dL      BUN/Creatinine Ratio 7.8     Anion Gap 4.9 mmol/L      eGFR 120.4 mL/min/1.73      Comment: National Kidney Foundation and American Society of Nephrology (ASN) Task Force recommended calculation based on the Chronic Kidney Disease Epidemiology Collaboration (CKD-EPI) equation refit without adjustment for race.       Narrative:      GFR Normal >60  Chronic Kidney Disease <60  Kidney Failure <15      CBC Auto Differential [[id number]]  (Abnormal) Collected: 12/17/22 0828    Specimen: Blood Updated: 12/17/22 0856     WBC 10.21 10*3/mm3      RBC 3.42 10*6/mm3      Hemoglobin 11.1 g/dL      Hematocrit 32.4 %      MCV 94.7 fL      MCH 32.5 pg      MCHC 34.3 g/dL      RDW 12.0 %      RDW-SD 41.1 fl      MPV 9.4 fL      Platelets 329 10*3/mm3      Neutrophil % 74.8 %      Lymphocyte % 19.2 %      Monocyte % 5.0 %      Eosinophil % 0.3 %      Basophil % 0.2 %       Immature Grans % 0.5 %      Neutrophils, Absolute 7.64 10*3/mm3      Lymphocytes, Absolute 1.96 10*3/mm3      Monocytes, Absolute 0.51 10*3/mm3      Eosinophils, Absolute 0.03 10*3/mm3      Basophils, Absolute 0.02 10*3/mm3      Immature Grans, Absolute 0.05 10*3/mm3      nRBC 0.0 /100 WBC           Imaging Results (Last 24 Hours)     Procedure Component Value Units Date/Time    CT Abdomen Pelvis With Contrast [[id number]] Collected: 12/17/22 0301     Updated: 12/17/22 0301    Narrative:      CR  Patient: KLARISSA CHAN  Time Out: 03:00  Exam(s): CT ABDOMEN + PELVIS With Contrast     EXAM:    CT Abdomen and Pelvis With Intravenous Contrast    CLINICAL HISTORY:     Reason for exam: Abdominal pain, acute, nonlocalized.    TECHNIQUE:    Axial computed tomography images of the abdomen and pelvis with   intravenous contrast.  CTDI is 8.1 mGy and DLP is 368.1 mGy-cm.  This CT   exam was performed according to the principle of ALARA (As Low As   Reasonably Achievable) by using one or more of the following dose   reduction techniques: automated exposure control, adjustment of the mA   and or kV according to patient size, and or use of iterative   reconstruction technique.    COMPARISON:    No relevant prior studies available.    FINDINGS:    Lung bases:  Unremarkable.  No mass.  No consolidation.     ABDOMEN:    Liver:  Unremarkable.  No mass.    Gallbladder and bile ducts:  Unremarkable.  No calcified stones.  No   ductal dilation.    Pancreas:  Unremarkable.  No mass.  No ductal dilation.    Spleen:  Unremarkable.  No splenomegaly.    Adrenals:  Unremarkable.  No mass.    Kidneys and ureters:  Unremarkable.  No solid mass.  No hydronephrosis.    Stomach and bowel:  Loop of jejunum in the left lower quadrant abdomen   demonstrates abnormal diffuse circumferential wall thickening and   mesenteric edema.  No findings of bowel obstruction.     PELVIS:    Appendix:  The appendix is normal.    Bladder:  Unremarkable.  No  mass.    Reproductive:  Unremarkable as visualized.     ABDOMEN and PELVIS:    Intraperitoneal space:  Small volume ascites.  No free air.    Bones joints:  No acute fracture.  No dislocation.    Soft tissues:  Unremarkable.    Vasculature:  Unremarkable.  No abdominal aortic aneurysm.    Lymph nodes:  Unremarkable.  No enlarged lymph nodes.    IMPRESSION:         Wall thickening in the jejunum with mesenteric edema are findings   suggestive of regional enteritis with a differential diagnosis of small   bowel ischemia.  Correlate clinically.      Impression:            Communications:    12 17 22 02:59 Call Doctor Regarding Above results, called  Dr. Sanabria on   12 17 02:59 (-05:00)    Electronically signed by Morgan Guzman MD on 12-17-22 at 0300          Results for orders placed during the hospital encounter of 06/24/22    Adult Stress Echo W/ Cont or Stress Agent if Necessary Per Protocol    Interpretation Summary  · Estimated left ventricular EF = 53% Left ventricular systolic function is normal.  · Left ventricular diastolic function was normal.  · This was a suboptimal stress test. No evidence of ischemia at the heart rate achieved which was 78% of age-predicted maximum heart rate.      No orders to display        Assessment/Plan     Active Hospital Problems    Diagnosis  POA   • **Generalized abdominal pain [R10.84]  Yes   • Leukocytosis [D72.829]  Yes   • Hypertension [I10]  Yes   • 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     White count 14 on arrival, down to 10 today.  Normal lactate and lipase.    CT abdomen and pelvis findings suggestive of enteritis versus small bowel ischemia.  Gastroenterology has been consulted.  Continue IV fluids, Zofran, Dilaudid as needed.  Keep n.p.o. until otherwise the advised by gastroenterology.      · I discussed the patient's findings and my recommendations with patient.    VTE Prophylaxis -  SCDs.  Code Status - Full code.       KUNAL Terrell  [facility]  12/17/22  11:43 EST

## 2022-12-18 ENCOUNTER — APPOINTMENT (OUTPATIENT)
Dept: CT IMAGING | Facility: HOSPITAL | Age: 42
DRG: 392 | End: 2022-12-18
Payer: MEDICAID

## 2022-12-18 ENCOUNTER — INPATIENT HOSPITAL (OUTPATIENT)
Dept: URBAN - METROPOLITAN AREA HOSPITAL 113 | Facility: HOSPITAL | Age: 42
End: 2022-12-18
Payer: MEDICAID

## 2022-12-18 DIAGNOSIS — D72.829 ELEVATED WHITE BLOOD CELL COUNT, UNSPECIFIED: ICD-10-CM

## 2022-12-18 DIAGNOSIS — R93.3 ABNORMAL FINDINGS ON DIAGNOSTIC IMAGING OF OTHER PARTS OF DI: ICD-10-CM

## 2022-12-18 DIAGNOSIS — R74.8 ABNORMAL LEVELS OF OTHER SERUM ENZYMES: ICD-10-CM

## 2022-12-18 DIAGNOSIS — R10.9 UNSPECIFIED ABDOMINAL PAIN: ICD-10-CM

## 2022-12-18 LAB
ANION GAP SERPL CALCULATED.3IONS-SCNC: 3.9 MMOL/L (ref 5–15)
BUN SERPL-MCNC: 4 MG/DL (ref 6–20)
BUN/CREAT SERPL: 8.2 (ref 7–25)
CALCIUM SPEC-SCNC: 8.6 MG/DL (ref 8.6–10.5)
CHLORIDE SERPL-SCNC: 107 MMOL/L (ref 98–107)
CO2 SERPL-SCNC: 28.1 MMOL/L (ref 22–29)
CREAT SERPL-MCNC: 0.49 MG/DL (ref 0.57–1)
DEPRECATED RDW RBC AUTO: 39.9 FL (ref 37–54)
EGFRCR SERPLBLD CKD-EPI 2021: 121.6 ML/MIN/1.73
ERYTHROCYTE [DISTWIDTH] IN BLOOD BY AUTOMATED COUNT: 11.6 % (ref 12.3–15.4)
GLUCOSE SERPL-MCNC: 89 MG/DL (ref 65–99)
HCT VFR BLD AUTO: 30.8 % (ref 34–46.6)
HGB BLD-MCNC: 10.4 G/DL (ref 12–15.9)
MCH RBC QN AUTO: 31.1 PG (ref 26.6–33)
MCHC RBC AUTO-ENTMCNC: 33.8 G/DL (ref 31.5–35.7)
MCV RBC AUTO: 92.2 FL (ref 79–97)
PLATELET # BLD AUTO: 321 10*3/MM3 (ref 140–450)
PMV BLD AUTO: 9.5 FL (ref 6–12)
POTASSIUM SERPL-SCNC: 3.8 MMOL/L (ref 3.5–5.2)
RBC # BLD AUTO: 3.34 10*6/MM3 (ref 3.77–5.28)
SODIUM SERPL-SCNC: 139 MMOL/L (ref 136–145)
WBC NRBC COR # BLD: 7.23 10*3/MM3 (ref 3.4–10.8)

## 2022-12-18 PROCEDURE — 25010000002 ONDANSETRON PER 1 MG: Performed by: NURSE PRACTITIONER

## 2022-12-18 PROCEDURE — G0378 HOSPITAL OBSERVATION PER HR: HCPCS

## 2022-12-18 PROCEDURE — 99232 SBSQ HOSP IP/OBS MODERATE 35: CPT | Performed by: NURSE PRACTITIONER

## 2022-12-18 PROCEDURE — 25010000002 HYDROMORPHONE PER 4 MG: Performed by: INTERNAL MEDICINE

## 2022-12-18 PROCEDURE — 74174 CTA ABD&PLVS W/CONTRAST: CPT

## 2022-12-18 PROCEDURE — 80048 BASIC METABOLIC PNL TOTAL CA: CPT | Performed by: NURSE PRACTITIONER

## 2022-12-18 PROCEDURE — 36415 COLL VENOUS BLD VENIPUNCTURE: CPT | Performed by: NURSE PRACTITIONER

## 2022-12-18 PROCEDURE — 85027 COMPLETE CBC AUTOMATED: CPT | Performed by: NURSE PRACTITIONER

## 2022-12-18 PROCEDURE — 0 IOPAMIDOL PER 1 ML: Performed by: INTERNAL MEDICINE

## 2022-12-18 RX ADMIN — Medication 10 ML: at 09:10

## 2022-12-18 RX ADMIN — HYDROMORPHONE HYDROCHLORIDE 0.5 MG: 1 INJECTION, SOLUTION INTRAMUSCULAR; INTRAVENOUS; SUBCUTANEOUS at 16:13

## 2022-12-18 RX ADMIN — Medication 10 ML: at 20:13

## 2022-12-18 RX ADMIN — LISINOPRIL 20 MG: 20 TABLET ORAL at 09:09

## 2022-12-18 RX ADMIN — PANTOPRAZOLE SODIUM 40 MG: 40 TABLET, DELAYED RELEASE ORAL at 09:09

## 2022-12-18 RX ADMIN — ONDANSETRON 4 MG: 2 INJECTION INTRAMUSCULAR; INTRAVENOUS at 09:09

## 2022-12-18 RX ADMIN — HYDROMORPHONE HYDROCHLORIDE 0.5 MG: 1 INJECTION, SOLUTION INTRAMUSCULAR; INTRAVENOUS; SUBCUTANEOUS at 20:11

## 2022-12-18 RX ADMIN — HYDROMORPHONE HYDROCHLORIDE 0.5 MG: 1 INJECTION, SOLUTION INTRAMUSCULAR; INTRAVENOUS; SUBCUTANEOUS at 09:08

## 2022-12-18 RX ADMIN — HYDROMORPHONE HYDROCHLORIDE 0.5 MG: 1 INJECTION, SOLUTION INTRAMUSCULAR; INTRAVENOUS; SUBCUTANEOUS at 02:11

## 2022-12-18 RX ADMIN — SERTRALINE HYDROCHLORIDE 50 MG: 50 TABLET, FILM COATED ORAL at 09:09

## 2022-12-18 RX ADMIN — IOPAMIDOL 95 ML: 755 INJECTION, SOLUTION INTRAVENOUS at 11:57

## 2022-12-18 NOTE — PLAN OF CARE
Goal Outcome Evaluation:   Patient alert follows commands ad david with minimal assistance. Prn pain meds given. No nausea noted. Iv fluids infusing. No acute distress noted will continue to monitor
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
## 2022-12-18 NOTE — PROGRESS NOTES
Name: Talisha Zepeda ADMIT: 2022   : 1980  PCP: John Taylor MD    MRN: [id number] LOS: 1 days   AGE/SEX: 42 y.o. female  ROOM: [facility]     Subjective   Subjective     Abdominal pain lingering but improved.  Worse with cough and movement. Tolerating full liquid diet.  No nausea, vomiting, diarrhea.  No other concerns.        Objective   Objective   Vital Signs  Temp:  [97.3 °F (36.3 °C)-98.3 °F (36.8 °C)] 98.3 °F (36.8 °C)  Heart Rate:  [62-74] 62  Resp:  [16-18] 18  BP: ()/(62-81) 118/67  SpO2:  [95 %-99 %] 99 %  on   ;   Device (Oxygen Therapy): room air  Body mass index is 26.7 kg/m².  Physical Exam   Constitutional:       General: She is not in acute distress.     Appearance: She is normal weight. She is not ill-appearing.   HENT:      Head: Normocephalic and atraumatic.      Mouth/Throat:      Mouth: Mucous membranes are moist.   Eyes:      Extraocular Movements: Extraocular movements intact.      Pupils: Pupils are equal, round, and reactive to light.   Cardiovascular:      Rate and Rhythm: Normal rate and regular rhythm.      Pulses: Normal pulses.      Heart sounds: Normal heart sounds.   Pulmonary:       Effort: Pulmonary effort is normal. No respiratory distress.      Breath sounds: Normal breath sounds.   Abdominal:      General: Bowel sounds are normal. There is distension.      Palpations: Abdomen is soft.      Tenderness: There is abdominal tenderness.   Musculoskeletal:         General: No swelling or tenderness. Normal range of motion.      Cervical back: Normal range of motion and neck supple.   Skin:     General: Skin is warm and dry.   Neurological:      General: No focal deficit present.      Mental Status: She is oriented to person, place, and time.   Psychiatric:         Mood and Affect: Mood normal.   Results Review     I reviewed the patient's new clinical results.  Results from last 7 days   Lab Units 22  0922   WBC 10*3/mm3  7.23 10.21 14.92*   HEMOGLOBIN g/dL 10.4* 11.1* 12.4   PLATELETS 10*3/mm3 321 329 392     Results from last 7 days   Lab Units 12/18/22 0921 12/17/22 0828 12/16/22 2020   SODIUM mmol/L 139 140 136   POTASSIUM mmol/L 3.8 3.7 3.9   CHLORIDE mmol/L 107 106 104   CO2 mmol/L 28.1 29.1* 27.0   BUN mg/dL 4* 4* 5*   CREATININE mg/dL 0.49* 0.51* 0.55*   GLUCOSE mg/dL 89 81 108*   EGFR mL/min/1.73 121.6 120.4 118.3     Results from last 7 days   Lab Units 12/16/22 2020   ALBUMIN g/dL 4.00   BILIRUBIN mg/dL 0.2   ALK PHOS U/L 214*   AST (SGOT) U/L 40*   ALT (SGPT) U/L 36*     Results from last 7 days   Lab Units 12/18/22 0921 12/17/22 0828 12/16/22 2020   CALCIUM mg/dL 8.6 8.3* 8.6   ALBUMIN g/dL  --   --  4.00     Results from last 7 days   Lab Units 12/17/22  0313   LACTATE mmol/L 0.5     No results found for: HGBA1C, POCGLU    CT Abdomen Pelvis With Contrast    Result Date: 12/17/2022  Communications: 12 17 22 02:59 Call Doctor Regarding Above results, called  Dr. Sanabria on 12 17 02:59 (-05:00) Electronically signed by Morgan Guzman MD on 12-17-22 at 0300    Scheduled Medications  lisinopril, 20 mg, Oral, Daily  pantoprazole, 40 mg, Oral, QAM  PATIENT SUPPLIED MEDICATION, 1 tablet, Oral, Daily  sertraline, 50 mg, Oral, Daily  sodium chloride, 10 mL, Intravenous, Q12H    Infusions  sodium chloride, 100 mL/hr, Last Rate: 100 mL/hr (12/17/22 2040)    Diet  Diet: Liquid Diets; Full Liquid; Texture: Regular Texture (IDDSI 7); Fluid Consistency: Thin (IDDSI 0)       Assessment/Plan     Active Hospital Problems    Diagnosis  POA   • **Generalized abdominal pain [R10.84]  Yes   • Leukocytosis [D72.829]  Yes   • Hypertension [I10]  Yes   • 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     Abdominal pain thought to be consistent with abdominal wall pain.  Enteritis possibly residual from influenza infection.  CTA of abdomen ordered by gastroenterology and  pending.  Appreciate input from gastroenterologist.    We will order heating pad for pain control.    Labs in the morning.    · SCDs for DVT prophylaxis.  · Full code.  · Discussed with patient.  · Anticipate discharge home timing yet to be determined.      KUNAL Terrell  [facility]  12/18/22  11:22 EST

## 2022-12-18 NOTE — PLAN OF CARE
Goal Outcome Evaluation:  Plan of Care Reviewed With: patient        Progress: no change  Outcome Evaluation: Pt lying in bed, gets up independently to chair or to br, gait steady, ivf infusing, taking full liq diet fair, one emesis at lunch time, Zofran given this am, prn pain med given x2 this shift  for c/o rlq pain, pt has congested cough , pain to rlq when coughing, sr on tele, nad, ctm.

## 2022-12-19 ENCOUNTER — TELEPHONE (OUTPATIENT)
Dept: FAMILY MEDICINE CLINIC | Facility: CLINIC | Age: 42
End: 2022-12-19

## 2022-12-19 DIAGNOSIS — F90.0 ATTENTION DEFICIT HYPERACTIVITY DISORDER (ADHD), PREDOMINANTLY INATTENTIVE TYPE: ICD-10-CM

## 2022-12-19 LAB
ANION GAP SERPL CALCULATED.3IONS-SCNC: 6.1 MMOL/L (ref 5–15)
BUN SERPL-MCNC: 4 MG/DL (ref 6–20)
BUN/CREAT SERPL: 8.9 (ref 7–25)
CALCIUM SPEC-SCNC: 8.3 MG/DL (ref 8.6–10.5)
CHLORIDE SERPL-SCNC: 105 MMOL/L (ref 98–107)
CO2 SERPL-SCNC: 25.9 MMOL/L (ref 22–29)
CREAT SERPL-MCNC: 0.45 MG/DL (ref 0.57–1)
DEPRECATED RDW RBC AUTO: 38.9 FL (ref 37–54)
EGFRCR SERPLBLD CKD-EPI 2021: 124.1 ML/MIN/1.73
ERYTHROCYTE [DISTWIDTH] IN BLOOD BY AUTOMATED COUNT: 11.4 % (ref 12.3–15.4)
GLUCOSE SERPL-MCNC: 72 MG/DL (ref 65–99)
HCT VFR BLD AUTO: 30.2 % (ref 34–46.6)
HGB BLD-MCNC: 10.3 G/DL (ref 12–15.9)
MCH RBC QN AUTO: 31.4 PG (ref 26.6–33)
MCHC RBC AUTO-ENTMCNC: 34.1 G/DL (ref 31.5–35.7)
MCV RBC AUTO: 92.1 FL (ref 79–97)
PLATELET # BLD AUTO: 323 10*3/MM3 (ref 140–450)
PMV BLD AUTO: 9.7 FL (ref 6–12)
POTASSIUM SERPL-SCNC: 3.8 MMOL/L (ref 3.5–5.2)
RBC # BLD AUTO: 3.28 10*6/MM3 (ref 3.77–5.28)
SODIUM SERPL-SCNC: 137 MMOL/L (ref 136–145)
WBC NRBC COR # BLD: 5.79 10*3/MM3 (ref 3.4–10.8)

## 2022-12-19 PROCEDURE — G0378 HOSPITAL OBSERVATION PER HR: HCPCS

## 2022-12-19 PROCEDURE — 25010000002 HYDROMORPHONE PER 4 MG: Performed by: INTERNAL MEDICINE

## 2022-12-19 PROCEDURE — 85027 COMPLETE CBC AUTOMATED: CPT | Performed by: NURSE PRACTITIONER

## 2022-12-19 PROCEDURE — 99232 SBSQ HOSP IP/OBS MODERATE 35: CPT | Performed by: INTERNAL MEDICINE

## 2022-12-19 PROCEDURE — 80048 BASIC METABOLIC PNL TOTAL CA: CPT | Performed by: NURSE PRACTITIONER

## 2022-12-19 RX ORDER — NORGESTIMATE AND ETHINYL ESTRADIOL 0.25-0.035
1 KIT ORAL DAILY
Status: DISCONTINUED | OUTPATIENT
Start: 2022-12-20 | End: 2022-12-20 | Stop reason: HOSPADM

## 2022-12-19 RX ORDER — HYDROCODONE BITARTRATE AND ACETAMINOPHEN 5; 325 MG/1; MG/1
1 TABLET ORAL EVERY 6 HOURS PRN
Status: DISCONTINUED | OUTPATIENT
Start: 2022-12-19 | End: 2022-12-20 | Stop reason: HOSPADM

## 2022-12-19 RX ADMIN — PANTOPRAZOLE SODIUM 40 MG: 40 TABLET, DELAYED RELEASE ORAL at 06:59

## 2022-12-19 RX ADMIN — HYDROMORPHONE HYDROCHLORIDE 0.5 MG: 1 INJECTION, SOLUTION INTRAMUSCULAR; INTRAVENOUS; SUBCUTANEOUS at 01:23

## 2022-12-19 RX ADMIN — HYDROCODONE BITARTRATE AND ACETAMINOPHEN 1 TABLET: 5; 325 TABLET ORAL at 18:06

## 2022-12-19 RX ADMIN — Medication 10 ML: at 21:45

## 2022-12-19 RX ADMIN — HYDROMORPHONE HYDROCHLORIDE 0.5 MG: 1 INJECTION, SOLUTION INTRAMUSCULAR; INTRAVENOUS; SUBCUTANEOUS at 11:00

## 2022-12-19 RX ADMIN — LISINOPRIL 20 MG: 20 TABLET ORAL at 08:29

## 2022-12-19 RX ADMIN — Medication 10 ML: at 08:30

## 2022-12-19 RX ADMIN — SERTRALINE HYDROCHLORIDE 50 MG: 50 TABLET, FILM COATED ORAL at 08:29

## 2022-12-19 RX ADMIN — HYDROMORPHONE HYDROCHLORIDE 0.5 MG: 1 INJECTION, SOLUTION INTRAMUSCULAR; INTRAVENOUS; SUBCUTANEOUS at 06:59

## 2022-12-19 RX ADMIN — SODIUM CHLORIDE 100 ML/HR: 9 INJECTION, SOLUTION INTRAVENOUS at 17:53

## 2022-12-19 NOTE — TELEPHONE ENCOUNTER
Caller: Talisha Zepeda    Relationship: Self    Best call back number: [phone]    Requested Prescriptions:   Requested Prescriptions     Pending Prescriptions Disp Refills   • lisdexamfetamine (Vyvanse) 30 MG capsule 30 capsule 0     Sig: Take 1 capsule by mouth Every Morning ADHD        Pharmacy where request should be sent: [id number] [place], [place] - [address] [facility] [address] [facility] [phone] [facility] [phone]      Additional details provided by patient: THE PATIENT IS CURRENTLY OUT OF THIS MEDICATION.    Does the patient have less than a 3 day supply:  [x] Yes  [] No    Would you like a call back once the refill request has been completed: [x] Yes [] No    If the office needs to give you a call back, can they leave a voicemail: [x] Yes [] No    Michael Cotter Rep   12/19/22 11:05 EST
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
## 2022-12-19 NOTE — PAYOR COMM NOTE
Klarissa Chan (42 y.o. Female)     ATTN: INITIAL REQUEST FOR INPATIENT AUTHORIZATION: REF# [id number]    PLEASE REPLY TO UR DEPT: [phone],  [phone]    [facility]: NPI [id number]  [facility]# [id number]        Date of Birth   1980    Social Security Number       Address   [address] [place] [address] [facility] [place] [address]    Home Phone   [phone]    MRN   [id number]       Yazdanism   None    Marital Status   Single                            Admission Date   12/17/22    Admission Type   Emergency    Admitting Provider   Graeme Howard MD    Attending Provider   Osbaldo Browning MD    Department, Room/Bed   [address], E656/1       Discharge Date       Discharge Disposition       Discharge Destination                               Attending Provider: Osbaldo Browning MD    Allergies: No Known Allergies    Isolation: None   Infection: None   Code Status: CPR    Ht: 157.5 cm (62\")   Wt: 57.4 kg (126 lb 9.6 oz)    Admission Cmt: None   Principal Problem: Generalized abdominal pain [R10.84]                 Active Insurance as of 12/17/2022     Primary Coverage     Payor Plan Insurance Group Employer/Plan Group    [place] [id number]     Payor Plan Address Payor Plan Phone Number Payor Plan Fax Number Effective Dates    [employer] [address] [address] [phone]  1/1/2020 - None Entered    [facility] [id number]       Subscriber Name Subscriber Birth Date Member ID       KLARISSA CHAN 1980 [id number]                 Emergency Contacts      (Rel.) Home Phone Work Phone Mobile Phone    MONTEZ OMALLEY (EX SPOUSE) (Friend) [phone] -- [phone]    Eric Avelar (Roommate) -- -- [phone]               History & Physical      Sara Sanabria APRN at 12/17/22 0806     Attestation signed by Bob Barragan MD at 12/17/22 1616    I have reviewed this documentation and agree.  I agree with current  assessment and plan.  This is a 42-year-old female, presents to the hospital with abdominal pain.  CT abdomen/pelvis findings concerning for enteritis versus small bowel ischemia, gastroenterology evaluation has been requested, keep patient NPO.  Symptomatic management.    Physical exam  General, awake and alert.  Head and ENT, normocephalic and atraumatic.  Lungs, symmetric expansion, equal air entry bilaterally.  Heart, regular rate and rhythm.  Abdomen, generalized distention noted  Extremities, no clubbing or cyanosis.  Neuro, no focal deficits.  Skin: Warm and no rash.  Psych, normal mood and affect.  Musculoskeletal, joint examination is grossly normal.                      Patient Name:  Talisha Zepeda  YOB: 1980  MRN:  [id number]  Admit Date:  12/17/2022  Patient Care Team:  John Taylor MD as PCP - General (Internal Medicine)  Ridgeland, Elvis Alicea MD as Consulting Physician (Obstetrics and Gynecology)      Subjective    History Present Illness     Chief Complaint   Patient presents with   • Abdominal Pain       Ms. Zepeda is a 42 y.o.  Female with a history of peripartum cardiomyopathy, hypertension, generalized anxiety disorder, ADHD, partial hysterectomy that presents to [facility] complaining of abdominal pain and distention for 3 days. Having mild intermittent nausea, no vomiting accompanied by intermittent chills.  She recently recovered from the flu and had a lingering cough for several days.  Initially thought abdominal pain was related to coughing, however the abdominal pain worsened and continued after the cough.  It is generalized over her entire abdomen, waxing and waning and moderate to severe intensity. Describes it as sharp intense cramping.  She denies any constipation or diarrhea.  No changes in diet recently.  No medication changes.  She has not experienced any similar episodes in the past.    She has a history of hypertension which was thought  to be related to taking Adderall.  She was changed to Vyvanse by her primary care provider and having blood pressure frequently monitored with plans to stop antihypertensive meds if she continues to have acceptable BP readings.  Patient also reporting history of peripartum cardiomyopathy, resolved several months after her pregnancy.    Dilaudid providing good pain relief.    Review of Systems   Constitutional: Positive for chills. Negative for fatigue and fever.   HENT: Negative for congestion, postnasal drip and rhinorrhea.    Eyes: Negative for visual disturbance.   Respiratory: Negative for cough and shortness of breath.    Gastrointestinal: Positive for abdominal distention, abdominal pain and nausea. Negative for constipation, diarrhea and vomiting.   Endocrine: Negative for cold intolerance and heat intolerance.   Genitourinary: Negative for difficulty urinating.   Musculoskeletal: Negative for arthralgias and myalgias.   Skin: Negative for rash and wound.   Neurological: Negative for dizziness, weakness, light-headedness and numbness.        Personal History     Past Medical History:   Diagnosis Date   • ADHD    • Anxiety    • Asthma    • Chest pain, midsternal     states w/anxiety & activity, relieved w/tums, not sure if reflux or d/t anxiety   • Depression    • GERD (gastroesophageal reflux disease)    • History of acquired CHF (congestive heart failure) 2015    with/after pregnancy   • History of gestational hypertension    • History of transfusion 2015    after delivery   • Hx gestational diabetes    • Leukocytosis 2022   • Low back pain     right, h/o slipped disc   • Migraines    • Personal history of cardiomyopathy 2015    w/pregnancy, states treated by Dr Ortiz at [facility]     Past Surgical History:   Procedure Laterality Date   •  SECTION      x3   • CHIN IMPLANT INSERTION  2010    w/jaw surgery   • D & C HYSTEROSCOPY ENDOMETRIAL ABLATION N/A 2019    Procedure: INTRAUTERINE DEVICE  REMOVAL  DIAGNOSTIC HYSTEROSCOPY, DILATATION AND CURETTAGE, NOVASURE ENDOMETRIAL ABLATION.;  Surgeon: Elvis Andre MD;  Location:  [facility] OR;  Service: Obstetrics/Gynecology   • TOTAL LAPAROSCOPIC HYSTERECTOMY N/A 2021    Procedure: TOTAL LAPAROSCOPIC HYSTERECTOMY, POSSIBLE TOTAL ABDOMINAL HYSTERECTOMY;  Surgeon: Elvis Andre MD;  Location:  [facility] OR;  Service: Obstetrics/Gynecology;  Laterality: N/A;   • TUBAL ABDOMINAL LIGATION       Family History   Problem Relation Age of Onset   • Breast cancer Mother    • Kidney failure Mother    • Diabetes Mother    • Heart failure Mother    • Hypertension Mother    • Anxiety disorder Mother    • Cancer Mother    • Depression Mother    • Kidney disease Mother    • Asthma Father    • Sleep apnea Father    • Alcohol abuse Father    • Arthritis Father    • Heart failure Maternal Grandfather    • Malig Hyperthermia Neg Hx      Social History     Tobacco Use   • Smoking status: Former     Packs/day: 0.50     Years: 13.00     Pack years: 6.50     Types: Cigarettes     Start date: 1999     Quit date: 2015     Years since quittin.9   • Smokeless tobacco: Never   Vaping Use   • Vaping Use: Never used   Substance Use Topics   • Alcohol use: Yes     Comment: few times a year   • Drug use: No     No current facility-administered medications on file prior to encounter.     Current Outpatient Medications on File Prior to Encounter   Medication Sig Dispense Refill   • ibuprofen (ADVIL,MOTRIN) 600 MG tablet Take 1 tablet by mouth Every 6 (Six) Hours As Needed for Moderate Pain. 21 tablet 0   • lisdexamfetamine (Vyvanse) 30 MG capsule Take 1 capsule by mouth Every Morning ADHD 30 capsule 0   • lisinopril (PRINIVIL,ZESTRIL) 20 MG tablet TAKE 1 TABLET BY MOUTH DAILY 30 tablet 3   • methocarbamol (ROBAXIN) 750 MG tablet Take 1 tablet by mouth 3 (Three) Times a Day As Needed for Muscle Spasms. 21 tablet 0   • norgestimate-ethinyl estradiol (Sprintec  28) 0.25-35 MG-MCG per tablet Take 1 tablet by mouth Daily. Skip blanks, and take active pills continuously for ovulation suppression 84 each 6   • nystatin (MYCOSTATIN) 100,000 unit/mL suspension Swish and spit 5 mL 4 (Four) Times a Day. For next week, until thrush improves. 473 mL 0   • omeprazole (priLOSEC) 40 MG capsule Take 1 capsule by mouth Daily. 90 capsule 1   • sertraline (ZOLOFT) 50 MG tablet Take 1 tablet by mouth Daily. 30 tablet 3     No Known Allergies    Objective     Objective     Vital Signs  Temp:  [99.4 °F (37.4 °C)] 99.4 °F (37.4 °C)  Heart Rate:  [63-96] 63  Resp:  [18] 18  BP: (100-144)/(72-96) 120/74  SpO2:  [93 %-100 %] 94 %  on   ;   Device (Oxygen Therapy): room air  Body mass index is 26.7 kg/m².    Physical Exam  Constitutional:       General: She is not in acute distress.     Appearance: She is normal weight. She is not ill-appearing.   HENT:      Head: Normocephalic and atraumatic.      Mouth/Throat:      Mouth: Mucous membranes are moist.   Eyes:      Extraocular Movements: Extraocular movements intact.      Pupils: Pupils are equal, round, and reactive to light.   Cardiovascular:      Rate and Rhythm: Normal rate and regular rhythm.      Pulses: Normal pulses.      Heart sounds: Normal heart sounds.   Pulmonary:      Effort: Pulmonary effort is normal. No respiratory distress.      Breath sounds: Normal breath sounds.   Abdominal:      General: Bowel sounds are normal. There is distension.      Palpations: Abdomen is soft.      Tenderness: There is abdominal tenderness.   Musculoskeletal:         General: No swelling or tenderness. Normal range of motion.      Cervical back: Normal range of motion and neck supple.   Skin:     General: Skin is warm and dry.   Neurological:      General: No focal deficit present.      Mental Status: She is oriented to person, place, and time.   Psychiatric:         Mood and Affect: Mood normal.         Results Review:  I reviewed the patient's new  clinical results.      Lab Results (last 24 hours)     Procedure Component Value Units Date/Time    CBC & Differential [[id number]]  (Abnormal) Collected: 12/16/22 2020    Specimen: Blood Updated: 12/16/22 2035    Narrative:      The following orders were created for panel order CBC & Differential.  Procedure                               Abnormality         Status                     ---------                               -----------         ------                     CBC Auto Differential[[id number]]        Abnormal            Final result                 Please view results for these tests on the individual orders.    Comprehensive Metabolic Panel [[id number]]  (Abnormal) Collected: 12/16/22 2020    Specimen: Blood Updated: 12/16/22 2051     Glucose 108 mg/dL      BUN 5 mg/dL      Creatinine 0.55 mg/dL      Sodium 136 mmol/L      Potassium 3.9 mmol/L      Chloride 104 mmol/L      CO2 27.0 mmol/L      Calcium 8.6 mg/dL      Total Protein 6.8 g/dL      Albumin 4.00 g/dL      ALT (SGPT) 36 U/L      AST (SGOT) 40 U/L      Alkaline Phosphatase 214 U/L      Total Bilirubin 0.2 mg/dL      Globulin 2.8 gm/dL      A/G Ratio 1.4 g/dL      BUN/Creatinine Ratio 9.1     Anion Gap 5.0 mmol/L      eGFR 118.3 mL/min/1.73      Comment: National Kidney Foundation and American Society of Nephrology (ASN) Task Force recommended calculation based on the Chronic Kidney Disease Epidemiology Collaboration (CKD-EPI) equation refit without adjustment for race.       Narrative:      GFR Normal >60  Chronic Kidney Disease <60  Kidney Failure <15      Lipase [[id number]]  (Normal) Collected: 12/16/22 2020    Specimen: Blood Updated: 12/16/22 2051     Lipase 24 U/L     CBC Auto Differential [[id number]]  (Abnormal) Collected: 12/16/22 2020    Specimen: Blood Updated: 12/16/22 2035     WBC 14.92 10*3/mm3      RBC 3.95 10*6/mm3      Hemoglobin 12.4 g/dL      Hematocrit 36.1 %      MCV 91.4 fL      MCH 31.4 pg      MCHC 34.3 g/dL      RDW 11.6 %       RDW-SD 39.6 fl      MPV 9.3 fL      Platelets 392 10*3/mm3      Neutrophil % 87.0 %      Lymphocyte % 8.9 %      Monocyte % 3.2 %      Eosinophil % 0.1 %      Basophil % 0.2 %      Immature Grans % 0.6 %      Neutrophils, Absolute 12.99 10*3/mm3      Lymphocytes, Absolute 1.33 10*3/mm3      Monocytes, Absolute 0.47 10*3/mm3      Eosinophils, Absolute 0.01 10*3/mm3      Basophils, Absolute 0.03 10*3/mm3      Immature Grans, Absolute 0.09 10*3/mm3      nRBC 0.0 /100 WBC     Urinalysis With Microscopic If Indicated (No Culture) - Urine, Clean Catch [[id number]]  (Abnormal) Collected: 12/16/22 2029    Specimen: Urine, Clean Catch Updated: 12/16/22 2102     Color, UA Yellow     Appearance, UA Clear     pH, UA >=9.0     Specific Gravity, UA 1.022     Glucose, UA Negative     Ketones, UA Negative     Bilirubin, UA Negative     Blood, UA Negative     Protein, UA Negative     Leuk Esterase, UA Trace     Nitrite, UA Negative     Urobilinogen, UA 1.0 E.U./dL    Urinalysis, Microscopic Only - Urine, Clean Catch [[id number]]  (Abnormal) Collected: 12/16/22 2029    Specimen: Urine, Clean Catch Updated: 12/16/22 2102     RBC, UA None Seen /HPF      WBC, UA 0-2 /HPF      Bacteria, UA Trace /HPF      Squamous Epithelial Cells, UA 0-2 /HPF      Hyaline Casts, UA None Seen /LPF      Methodology Manual Light Microscopy    Lactic Acid, Plasma [[id number]]  (Normal) Collected: 12/17/22 0313    Specimen: Blood Updated: 12/17/22 0337     Lactate 0.5 mmol/L     Basic Metabolic Panel [[id number]]  (Abnormal) Collected: 12/17/22 0828    Specimen: Blood Updated: 12/17/22 0912     Glucose 81 mg/dL      BUN 4 mg/dL      Creatinine 0.51 mg/dL      Sodium 140 mmol/L      Potassium 3.7 mmol/L      Chloride 106 mmol/L      CO2 29.1 mmol/L      Calcium 8.3 mg/dL      BUN/Creatinine Ratio 7.8     Anion Gap 4.9 mmol/L      eGFR 120.4 mL/min/1.73      Comment: National Kidney Foundation and American Society of Nephrology (ASN) Task Force recommended  calculation based on the Chronic Kidney Disease Epidemiology Collaboration (CKD-EPI) equation refit without adjustment for race.       Narrative:      GFR Normal >60  Chronic Kidney Disease <60  Kidney Failure <15      CBC Auto Differential [[id number]]  (Abnormal) Collected: 12/17/22 0828    Specimen: Blood Updated: 12/17/22 0856     WBC 10.21 10*3/mm3      RBC 3.42 10*6/mm3      Hemoglobin 11.1 g/dL      Hematocrit 32.4 %      MCV 94.7 fL      MCH 32.5 pg      MCHC 34.3 g/dL      RDW 12.0 %      RDW-SD 41.1 fl      MPV 9.4 fL      Platelets 329 10*3/mm3      Neutrophil % 74.8 %      Lymphocyte % 19.2 %      Monocyte % 5.0 %      Eosinophil % 0.3 %      Basophil % 0.2 %      Immature Grans % 0.5 %      Neutrophils, Absolute 7.64 10*3/mm3      Lymphocytes, Absolute 1.96 10*3/mm3      Monocytes, Absolute 0.51 10*3/mm3      Eosinophils, Absolute 0.03 10*3/mm3      Basophils, Absolute 0.02 10*3/mm3      Immature Grans, Absolute 0.05 10*3/mm3      nRBC 0.0 /100 WBC           Imaging Results (Last 24 Hours)     Procedure Component Value Units Date/Time    CT Abdomen Pelvis With Contrast [[id number]] Collected: 12/17/22 0301     Updated: 12/17/22 0301    Narrative:      CR  Patient: KLARISSA CHAN  Time Out: 03:00  Exam(s): CT ABDOMEN + PELVIS With Contrast     EXAM:    CT Abdomen and Pelvis With Intravenous Contrast    CLINICAL HISTORY:     Reason for exam: Abdominal pain, acute, nonlocalized.    TECHNIQUE:    Axial computed tomography images of the abdomen and pelvis with   intravenous contrast.  CTDI is 8.1 mGy and DLP is 368.1 mGy-cm.  This CT   exam was performed according to the principle of ALARA (As Low As   Reasonably Achievable) by using one or more of the following dose   reduction techniques: automated exposure control, adjustment of the mA   and or kV according to patient size, and or use of iterative   reconstruction technique.    COMPARISON:    No relevant prior studies available.    FINDINGS:    Lung  bases:  Unremarkable.  No mass.  No consolidation.     ABDOMEN:    Liver:  Unremarkable.  No mass.    Gallbladder and bile ducts:  Unremarkable.  No calcified stones.  No   ductal dilation.    Pancreas:  Unremarkable.  No mass.  No ductal dilation.    Spleen:  Unremarkable.  No splenomegaly.    Adrenals:  Unremarkable.  No mass.    Kidneys and ureters:  Unremarkable.  No solid mass.  No hydronephrosis.    Stomach and bowel:  Loop of jejunum in the left lower quadrant abdomen   demonstrates abnormal diffuse circumferential wall thickening and   mesenteric edema.  No findings of bowel obstruction.     PELVIS:    Appendix:  The appendix is normal.    Bladder:  Unremarkable.  No mass.    Reproductive:  Unremarkable as visualized.     ABDOMEN and PELVIS:    Intraperitoneal space:  Small volume ascites.  No free air.    Bones joints:  No acute fracture.  No dislocation.    Soft tissues:  Unremarkable.    Vasculature:  Unremarkable.  No abdominal aortic aneurysm.    Lymph nodes:  Unremarkable.  No enlarged lymph nodes.    IMPRESSION:         Wall thickening in the jejunum with mesenteric edema are findings   suggestive of regional enteritis with a differential diagnosis of small   bowel ischemia.  Correlate clinically.      Impression:            Communications:    12 17 22 02:59 Call Doctor Regarding Above results, called  Dr. Sanabria on   12 17 02:59 (-05:00)    Electronically signed by Morgan Guzman MD on 12-17-22 at 0300          Results for orders placed during the hospital encounter of 06/24/22    Adult Stress Echo W/ Cont or Stress Agent if Necessary Per Protocol    Interpretation Summary  · Estimated left ventricular EF = 53% Left ventricular systolic function is normal.  · Left ventricular diastolic function was normal.  · This was a suboptimal stress test. No evidence of ischemia at the heart rate achieved which was 78% of age-predicted maximum heart rate.      No orders to display       Assessment/Plan      Active Hospital Problems    Diagnosis  POA   • **Generalized abdominal pain [R10.84]  Yes   • Leukocytosis [D72.829]  Yes   • Hypertension [I10]  Yes   • 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     White count 14 on arrival, down to 10 today.  Normal lactate and lipase.    CT abdomen and pelvis findings suggestive of enteritis versus small bowel ischemia.  Gastroenterology has been consulted.  Continue IV fluids, Zofran, Dilaudid as needed.  Keep n.p.o. until otherwise the advised by gastroenterology.      · I discussed the patient's findings and my recommendations with patient.    VTE Prophylaxis - SCDs.  Code Status - Full code.       KUNAL Terrell  [facility]  12/17/22  11:43 EST    Electronically signed by Bob Barragan MD at 12/17/22 1616          Emergency Department Notes      Liz Ortega RN at 12/16/22 2012        Patient c/o intermittent generalized abdominal pain beginning yesterday morning. Patient had a BM 12/16 and states it was normal for her. Patient denies urinary symptoms. Patient reports nausea, no vomiting. Recently had the flu    Electronically signed by Liz Ortega RN at 12/16/22 2014     Sai Sanabria MD at 12/17/22 0112           EMERGENCY DEPARTMENT ENCOUNTER    CHIEF COMPLAINT  Chief Complaint: Abdominal pain  History given by: Patient  History limited by: None  Room Number: 22/22  PMD: John Taylor MD      HPI:  Pt is a 42 y.o. female who presents complaining of generalized abdominal discomfort that has been present now for the past 2 to 3 days.  She reports that she recently has been recovering from the flu and has had a significant amount of coughing.  She reports that she first thought it could be muscular injury secondary to the coughing or lifting but the pain has persisted.  She denies any localization of the pain.  She denies fever/chills,  nausea/vomiting, back pain, extremity pain, or shortness of breath.    Duration: Ongoing for the past 2 to 3 days  Onset: Gradual  Location: Generalized abdomen  Radiation: None  Quality: Dull/aching  Intensity/Severity: Moderate  Progression: Worsening  Associated Symptoms: None  Aggravating Factors: Movement of any kind or touch  Alleviating Factors: None  Previous Episodes: None  Treatment before arrival: Tylenol without relief    PAST MEDICAL HISTORY  Active Ambulatory Problems     Diagnosis Date Noted   • hx Peripartum cardiomyopathy 2018   • hx: Bilateral ovarian cysts 10/10/2018   • H/O bilateral salpingectomy 10/10/2018   • HPV in female 2018   • S/P laparoscopic hysterectomy: 2021   • Fatigue 2022   • Medication monitoring encounter 2022   • Family history of early CAD 2022   • Hypertension 2022   • ADHD 2022   • Anxiety 2022     Resolved Ambulatory Problems     Diagnosis Date Noted   • Pelvic pain in female 10/10/2018   • IUD contraception-Mirena 2018   • Menorrhagia with irregular cycle 2019   • History of 3  sections: pt denies any classical sections 2019   • History of endometrial ablation 2021   • Right ovarian cyst 2022     Past Medical History:   Diagnosis Date   • Asthma    • Chest pain, midsternal    • Depression    • GERD (gastroesophageal reflux disease)    • History of acquired CHF (congestive heart failure)    • History of gestational hypertension    • History of transfusion    • Hx gestational diabetes    • Leukocytosis 2022   • Low back pain    • Migraines    • Personal history of cardiomyopathy        PAST SURGICAL HISTORY  Past Surgical History:   Procedure Laterality Date   •  SECTION      x3   • CHIN IMPLANT INSERTION      w/jaw surgery   • D & C HYSTEROSCOPY ENDOMETRIAL ABLATION N/A 2019    Procedure: INTRAUTERINE DEVICE REMOVAL  DIAGNOSTIC  HYSTEROSCOPY, DILATATION AND CURETTAGE, NOVASURE ENDOMETRIAL ABLATION.;  Surgeon: Elvis Andre MD;  Location:  [facility] OR;  Service: Obstetrics/Gynecology   • TOTAL LAPAROSCOPIC HYSTERECTOMY N/A 2021    Procedure: TOTAL LAPAROSCOPIC HYSTERECTOMY, POSSIBLE TOTAL ABDOMINAL HYSTERECTOMY;  Surgeon: Elvis Andre MD;  Location:  [facility] OR;  Service: Obstetrics/Gynecology;  Laterality: N/A;   • TUBAL ABDOMINAL LIGATION         FAMILY HISTORY  Family History   Problem Relation Age of Onset   • Breast cancer Mother    • Kidney failure Mother    • Diabetes Mother    • Heart failure Mother    • Hypertension Mother    • Anxiety disorder Mother    • Cancer Mother    • Depression Mother    • Kidney disease Mother    • Asthma Father    • Sleep apnea Father    • Alcohol abuse Father    • Arthritis Father    • Heart failure Maternal Grandfather    • Malig Hyperthermia Neg Hx        SOCIAL HISTORY  Social History     Socioeconomic History   • Marital status: Single   Tobacco Use   • Smoking status: Former     Packs/day: 0.50     Years: 13.00     Pack years: 6.50     Types: Cigarettes     Start date: 1999     Quit date: 2015     Years since quittin.9   • Smokeless tobacco: Never   Vaping Use   • Vaping Use: Never used   Substance and Sexual Activity   • Alcohol use: Yes     Comment: few times a year   • Drug use: No   • Sexual activity: Yes     Partners: Male     Birth control/protection: Surgical, None       ALLERGIES  Patient has no known allergies.    REVIEW OF SYSTEMS  Review of Systems   Constitutional: Negative for fever.   HENT: Negative for sore throat.    Eyes: Negative.    Respiratory: Negative for cough and shortness of breath.    Cardiovascular: Negative for chest pain.   Gastrointestinal: Positive for abdominal pain. Negative for diarrhea and vomiting.   Genitourinary: Negative for dysuria.   Musculoskeletal: Negative for neck pain.   Skin: Negative for rash.    Allergic/Immunologic: Negative.    Neurological: Negative for weakness, numbness and headaches.   Hematological: Negative.    Psychiatric/Behavioral: Negative.    All other systems reviewed and are negative.      PHYSICAL EXAM  ED Triage Vitals [12/16/22 2006]   Temp Heart Rate Resp BP SpO2   99.4 °F (37.4 °C) 96 18 140/88 97 %      Temp src Heart Rate Source Patient Position BP Location FiO2 (%)   Tympanic Monitor Sitting Left arm --       Physical Exam  Vitals and nursing note reviewed.   Constitutional:       General: She is not in acute distress.  HENT:      Head: Normocephalic and atraumatic.   Eyes:      Extraocular Movements: EOM normal.      Pupils: Pupils are equal, round, and reactive to light.   Cardiovascular:      Rate and Rhythm: Normal rate and regular rhythm.      Heart sounds: Normal heart sounds.   Pulmonary:      Effort: Pulmonary effort is normal. No respiratory distress.      Breath sounds: Normal breath sounds.   Abdominal:      Palpations: Abdomen is soft.      Tenderness: There is generalized abdominal tenderness. There is no guarding or rebound.   Musculoskeletal:         General: No edema. Normal range of motion.      Cervical back: Normal range of motion and neck supple.   Skin:     General: Skin is warm and dry.      Findings: No rash.   Neurological:      Mental Status: She is alert and oriented to person, place, and time.      Sensory: Sensation is intact.      Motor: Motor strength is normal.   Psychiatric:         Mood and Affect: Mood and affect normal.         LAB RESULTS  Lab Results (last 24 hours)     Procedure Component Value Units Date/Time    CBC & Differential [[id number]]  (Abnormal) Collected: 12/16/22 2020    Specimen: Blood Updated: 12/16/22 2035    Narrative:      The following orders were created for panel order CBC & Differential.  Procedure                               Abnormality         Status                     ---------                               -----------          ------                     CBC Auto Differential[[id number]]        Abnormal            Final result                 Please view results for these tests on the individual orders.    Comprehensive Metabolic Panel [[id number]]  (Abnormal) Collected: 12/16/22 2020    Specimen: Blood Updated: 12/16/22 2051     Glucose 108 mg/dL      BUN 5 mg/dL      Creatinine 0.55 mg/dL      Sodium 136 mmol/L      Potassium 3.9 mmol/L      Chloride 104 mmol/L      CO2 27.0 mmol/L      Calcium 8.6 mg/dL      Total Protein 6.8 g/dL      Albumin 4.00 g/dL      ALT (SGPT) 36 U/L      AST (SGOT) 40 U/L      Alkaline Phosphatase 214 U/L      Total Bilirubin 0.2 mg/dL      Globulin 2.8 gm/dL      A/G Ratio 1.4 g/dL      BUN/Creatinine Ratio 9.1     Anion Gap 5.0 mmol/L      eGFR 118.3 mL/min/1.73      Comment: National Kidney Foundation and American Society of Nephrology (ASN) Task Force recommended calculation based on the Chronic Kidney Disease Epidemiology Collaboration (CKD-EPI) equation refit without adjustment for race.       Narrative:      GFR Normal >60  Chronic Kidney Disease <60  Kidney Failure <15      Lipase [[id number]]  (Normal) Collected: 12/16/22 2020    Specimen: Blood Updated: 12/16/22 2051     Lipase 24 U/L     CBC Auto Differential [[id number]]  (Abnormal) Collected: 12/16/22 2020    Specimen: Blood Updated: 12/16/22 2035     WBC 14.92 10*3/mm3      RBC 3.95 10*6/mm3      Hemoglobin 12.4 g/dL      Hematocrit 36.1 %      MCV 91.4 fL      MCH 31.4 pg      MCHC 34.3 g/dL      RDW 11.6 %      RDW-SD 39.6 fl      MPV 9.3 fL      Platelets 392 10*3/mm3      Neutrophil % 87.0 %      Lymphocyte % 8.9 %      Monocyte % 3.2 %      Eosinophil % 0.1 %      Basophil % 0.2 %      Immature Grans % 0.6 %      Neutrophils, Absolute 12.99 10*3/mm3      Lymphocytes, Absolute 1.33 10*3/mm3      Monocytes, Absolute 0.47 10*3/mm3      Eosinophils, Absolute 0.01 10*3/mm3      Basophils, Absolute 0.03 10*3/mm3      Immature Grans, Absolute  0.09 10*3/mm3      nRBC 0.0 /100 WBC     Urinalysis With Microscopic If Indicated (No Culture) - Urine, Clean Catch [[id number]]  (Abnormal) Collected: 12/16/22 2029    Specimen: Urine, Clean Catch Updated: 12/16/22 2102     Color, UA Yellow     Appearance, UA Clear     pH, UA >=9.0     Specific Gravity, UA 1.022     Glucose, UA Negative     Ketones, UA Negative     Bilirubin, UA Negative     Blood, UA Negative     Protein, UA Negative     Leuk Esterase, UA Trace     Nitrite, UA Negative     Urobilinogen, UA 1.0 E.U./dL    Urinalysis, Microscopic Only - Urine, Clean Catch [[id number]]  (Abnormal) Collected: 12/16/22 2029    Specimen: Urine, Clean Catch Updated: 12/16/22 2102     RBC, UA None Seen /HPF      WBC, UA 0-2 /HPF      Bacteria, UA Trace /HPF      Squamous Epithelial Cells, UA 0-2 /HPF      Hyaline Casts, UA None Seen /LPF      Methodology Manual Light Microscopy    Lactic Acid, Plasma [[id number]]  (Normal) Collected: 12/17/22 0313    Specimen: Blood Updated: 12/17/22 0337     Lactate 0.5 mmol/L           I ordered the above labs and reviewed the results    RADIOLOGY  CT Abdomen Pelvis With Contrast   Final Result            Communications:      12 17 22 02:59 Call Doctor Regarding Above results, called  Dr. Sanabria on    12 17 02:59 (-05:00)      Electronically signed by Morgan Guzman MD on 12-17-22 at 0300           I ordered the above noted radiological studies. Interpreted by radiologist. Discussed with radiologist. Reviewed by me in PACS.       PROCEDURES  Procedures      PROGRESS AND CONSULTS  ED Course as of 12/17/22 0706   Sat Dec 17, 2022   0421 On reevaluation, the patient does remain somewhat in pain however she does report improvement of symptoms following second dose IV analgesia treatment.  I did inform her that she does have a leukocytosis with associated inflammatory changes of the small bowel on CT.  Her lactate is negative however.  Given the ongoing pain and the abnormal CT, would like  to admit her to the hospital for further management and treatment.  The patient is in agreement with that plan and all questions have been answered. [BM]   0452 Case including history, physical, as well as findings discussed with KUNAL Mendez for [facility], who agrees to admit the patient to the hospital for Dr. Howard. [BM]      ED Course User Index  [BM] Sai Sanabria MD     The patient was wearing a facemask upon entrance into the room and remained in such throughout their visit.  I was wearing PPE including a facemask, eye protection, as well as gloves at any point entering the room and throughout the visit      MEDICAL DECISION MAKING  Results were reviewed/discussed with the patient and they were also made aware of online access. Pt also made aware that some labs, such as cultures, will not be resulted during ER visit and follow up with PMD is necessary.     MDM  Number of Diagnoses or Management Options  Diagnosis management comments: Differential diagnosis includes but is not limited to acute appendicitis, cholelithiasis, cholecystitis, acute diverticulitis, small bowel obstruction, gastritis/GERD, or intraperitoneal free air.       Amount and/or Complexity of Data Reviewed  Clinical lab tests: ordered and reviewed  Tests in the radiology section of CPT®: ordered and reviewed  Tests in the medicine section of CPT®: ordered and reviewed  Decide to obtain previous medical records or to obtain history from someone other than the patient: yes  Review and summarize past medical records: yes (Upon medical records review, the patient was last seen and evaluated by her primary care provider on 12/9/2022 secondary to ongoing management of hypertension as well as generalized fatigue)           DIAGNOSIS  Final diagnoses:   Generalized abdominal pain   Enteritis   Leukocytosis, unspecified type   Abnormal CT of the abdomen       DISPOSITION  ADMISSION    Discussed treatment plan and reason for admission with  pt/family and admitting physician.  Pt/family voiced understanding of the plan for admission for further testing/treatment as needed.         Latest Documented Vital Signs:  As of 07:06 EST  BP- 115/72 HR- 72 Temp- 99.4 °F (37.4 °C) (Tympanic) O2 sat- 94%         Sai Sanabria MD  12/17/22 0706      Electronically signed by Sai Sanabria MD at 12/17/22 0706     Yumiko Parry, RN at 12/17/22 1432        Patient requesting food.  Advised patient that she is currently NPO.  Patient stated, \"I do not care - I'll take even a popsicle.\"      Electronically signed by Yumiko Parry, RN at 12/17/22 1434     Yumiko Parry, RN at 12/17/22 1435          Nursing report ED to floor  Talisha Zepeda  42 y.o.  female    HPI :   Chief Complaint   Patient presents with   • Abdominal Pain       Admitting doctor:   Graeme Howard MD    Admitting diagnosis:   The primary encounter diagnosis was Generalized abdominal pain. Diagnoses of Enteritis, Leukocytosis, unspecified type, and Abnormal CT of the abdomen were also pertinent to this visit.    Code status:   Current Code Status       Date Active Code Status Order ID Comments User Context       12/17/2022 0456 CPR (Attempt to Resuscitate) [id number]  Brittany Kessler APRN ED        Question Answer    Code Status (Patient has no pulse and is not breathing) CPR (Attempt to Resuscitate)    Medical Interventions (Patient has pulse or is breathing) Full Support                    Allergies:   Patient has no known allergies.    Isolation:   No active isolations    Intake and Output    Intake/Output Summary (Last 24 hours) at 12/17/2022 1435  Last data filed at 12/17/2022 0356  Gross per 24 hour   Intake 1000 ml   Output --   Net 1000 ml       Weight:       12/17/22  0910   Weight: 66.2 kg (146 lb)       Most recent vitals:   Vitals:    12/17/22 1011 12/17/22 1043 12/17/22 1134 12/17/22 1430   BP: 116/72 120/74 126/81 122/71   BP Location:    Left arm   Patient  Position:    Lying   Pulse: 63 63 67 67   Resp:   18 16   Temp:    97.6 °F (36.4 °C)   TempSrc:    Tympanic   SpO2: 93% 94% 97% 98%   Weight:       Height:           Active LDAs/IV Access:   Lines, Drains & Airways       Active LDAs       Name Placement date Placement time Site Days    Peripheral IV 12/17/22 0118 Anterior;Proximal;Right Forearm 12/17/22 0118  Forearm  less than 1                    Labs (abnormal labs have a star):   Labs Reviewed   COMPREHENSIVE METABOLIC PANEL - Abnormal; Notable for the following components:       Result Value    Glucose 108 (*)     BUN 5 (*)     Creatinine 0.55 (*)     ALT (SGPT) 36 (*)     AST (SGOT) 40 (*)     Alkaline Phosphatase 214 (*)     All other components within normal limits    Narrative:     GFR Normal >60  Chronic Kidney Disease <60  Kidney Failure <15     URINALYSIS W/ MICROSCOPIC IF INDICATED (NO CULTURE) - Abnormal; Notable for the following components:    pH, UA >=9.0 (*)     Leuk Esterase, UA Trace (*)     All other components within normal limits   CBC WITH AUTO DIFFERENTIAL - Abnormal; Notable for the following components:    WBC 14.92 (*)     RDW 11.6 (*)     Neutrophil % 87.0 (*)     Lymphocyte % 8.9 (*)     Monocyte % 3.2 (*)     Eosinophil % 0.1 (*)     Immature Grans % 0.6 (*)     Neutrophils, Absolute 12.99 (*)     Immature Grans, Absolute 0.09 (*)     All other components within normal limits   URINALYSIS, MICROSCOPIC ONLY - Abnormal; Notable for the following components:    Bacteria, UA Trace (*)     All other components within normal limits   BASIC METABOLIC PANEL - Abnormal; Notable for the following components:    BUN 4 (*)     Creatinine 0.51 (*)     CO2 29.1 (*)     Calcium 8.3 (*)     Anion Gap 4.9 (*)     All other components within normal limits    Narrative:     GFR Normal >60  Chronic Kidney Disease <60  Kidney Failure <15     CBC WITH AUTO DIFFERENTIAL - Abnormal; Notable for the following components:    RBC 3.42 (*)     Hemoglobin 11.1  (*)     Hematocrit 32.4 (*)     RDW 12.0 (*)     Lymphocyte % 19.2 (*)     Neutrophils, Absolute 7.64 (*)     All other components within normal limits   LIPASE - Normal   LACTIC ACID, PLASMA - Normal   RAINBOW DRAW    Narrative:     The following orders were created for panel order [facility] Draw.  Procedure                               Abnormality         Status                     ---------                               -----------         ------                     Green Top (Gel)[[id number]]                                  Final result               Lavender Top[[id number]]                                     Final result               Gold Top - SST[[id number]]                                   Final result               Light Blue Top[[id number]]                                   Final result                 Please view results for these tests on the individual orders.   CBC AND DIFFERENTIAL    Narrative:     The following orders were created for panel order CBC & Differential.  Procedure                               Abnormality         Status                     ---------                               -----------         ------                     CBC Auto Differential[[id number]]        Abnormal            Final result                 Please view results for these tests on the individual orders.   GREEN TOP   LAVENDER TOP   GOLD TOP - SST   LIGHT BLUE TOP       EKG:   No orders to display       Meds given in ED:   Medications   sodium chloride 0.9 % flush 10 mL (has no administration in time range)   sodium chloride 0.9 % flush 10 mL (10 mL Intravenous Given 12/17/22 0832)   sodium chloride 0.9 % flush 10 mL (10 mL Intravenous Given 12/17/22 0831)   sodium chloride 0.9 % infusion 40 mL (has no administration in time range)   nitroglycerin (NITROSTAT) SL tablet 0.4 mg (has no administration in time range)   ondansetron (ZOFRAN) injection 4 mg (has no administration in time range)   HYDROmorphone (DILAUDID)  injection 0.5 mg (0.5 mg Intravenous Given 22 1231)   sodium chloride 0.9 % infusion (100 mL/hr Intravenous Currently Infusing 22 1430)   lisinopril (PRINIVIL,ZESTRIL) tablet 20 mg (20 mg Oral Given 22 1207)   PATIENT SUPPLIED MEDICATION (1 tablet Oral Not Given 22 1208)   pantoprazole (PROTONIX) EC tablet 40 mg (40 mg Oral Given 22 1207)   sertraline (ZOLOFT) tablet 50 mg (50 mg Oral Given 22 1206)   sodium chloride 0.9 % bolus 1,000 mL (0 mL Intravenous Stopped 22 0356)   HYDROmorphone (DILAUDID) injection 0.5 mg (0.5 mg Intravenous Given 22 0122)   ondansetron (ZOFRAN) injection 4 mg (4 mg Intravenous Given 22 0121)   iopamidol (ISOVUE-300) 61 % injection 100 mL (85 mL Intravenous Given 22 0159)   HYDROmorphone (DILAUDID) injection 0.5 mg (0.5 mg Intravenous Given 22 0256)       Imaging results:  CT Abdomen Pelvis With Contrast    Result Date: 2022  Communications: 22 02:59 Call Doctor Regarding Above results, called  Dr. Sanabria on  02:59 (-05:00) Electronically signed by Morgan Guzman MD on 22 at 0300     Ambulatory status:   - Independent    Social issues:   Social History     Socioeconomic History   • Marital status: Single   Tobacco Use   • Smoking status: Former     Packs/day: 0.50     Years: 13.00     Pack years: 6.50     Types: Cigarettes     Start date: 1999     Quit date: 2015     Years since quittin.9   • Smokeless tobacco: Never   Vaping Use   • Vaping Use: Never used   Substance and Sexual Activity   • Alcohol use: Yes     Comment: few times a year   • Drug use: No   • Sexual activity: Yes     Partners: Male     Birth control/protection: Surgical, None       NIH Stroke Scale:         Yumiko Parry RN  22 14:35 EST          Electronically signed by Yumiko Parry RN at 22 1435       Vital Signs (last 3 days)     Date/Time Temp Temp src Pulse Resp BP Patient Position SpO2     12/19/22 0701 97.9 (36.6) Oral 65 18 118/60 Lying 97    12/19/22 0014 98.4 (36.9) Oral 68 18 120/78 Lying 96    12/18/22 2100 97.8 (36.6) Oral 73 18 112/69 Sitting 96    12/18/22 1335 97.6 (36.4) Oral 69 18 115/69 Lying 94    12/18/22 0908 -- -- 62 -- -- -- --    12/18/22 0656 98.3 (36.8) Oral 72 18 118/67 Lying 99    12/17/22 2338 97.7 (36.5) Oral 65 16 101/68 Lying 96    12/17/22 1940 97.3 (36.3) Oral 69 16 98/62 Lying 95    12/17/22 1538 -- -- 74 18 122/80 Lying 96    12/17/22 14:30:06 97.6 (36.4) Tympanic 67 16 122/71 Lying 98    12/17/22 1134 -- -- 67 18 126/81 -- 97    12/17/22 1043 -- -- 63 -- 120/74 -- 94    12/17/22 1011 -- -- 63 -- 116/72 -- 93    12/17/22 0937 -- -- 63 -- 125/74 -- 94    12/17/22 08:31:54 -- -- 75 18 117/84 -- 97    12/17/22 0646 -- -- 72 -- 115/72 -- 94    12/17/22 0516 -- -- 77 -- 135/87 -- 94    12/17/22 0500 -- -- 81 -- -- -- 94    12/17/22 0446 -- -- 83 -- 140/85 -- 95    12/17/22 0416 -- -- 83 -- 141/90 -- 95    12/17/22 0346 -- -- 86 -- 142/89 -- 95    12/17/22 0316 -- -- 88 18 144/96 Lying 95    12/17/22 02:34:45 -- -- 92 18 100/81 -- 100    12/17/22 0116 -- -- -- -- 119/93 Sitting --    12/16/22 2006 99.4 (37.4) Tympanic 96 18 140/88 Sitting 97          Oxygen Therapy (last 3 days)     Date/Time SpO2 Device (Oxygen Therapy) Flow (L/min) Oxygen Concentration (%) ETCO2 (mmHg)    12/19/22 0701 97 room air -- -- --    12/19/22 0014 96 room air -- -- --    12/18/22 2100 96 room air -- -- --    12/18/22 2000 -- room air -- -- --    12/18/22 1335 94 room air -- -- --    12/18/22 0656 99 room air -- -- --    12/17/22 2338 96 room air -- -- --    12/17/22 2006 -- room air -- -- --    12/17/22 1940 95 room air -- -- --    12/17/22 1547 -- room air -- -- --    12/17/22 14:30:06 98 room air -- -- --    12/17/22 1134 97 room air -- -- --    12/17/22 1043 94 -- -- -- --    12/17/22 1011 93 -- -- -- --    12/17/22 0937 94 -- -- -- --    12/17/22 08:31:54 97 -- -- -- --    12/17/22 0828 -- room  air -- -- --    12/17/22 0446 95 room air -- -- --    12/17/22 0416 95 room air -- -- --    12/17/22 02:34:45 100 -- -- -- --    12/16/22 2006 97 room air -- -- --        Intake & Output (last 3 days)       12/16 0701 12/17 0700 12/17 0701 12/18 0700 12/18 0701 12/19 0700 12/19 0701 12/20 0700    P.O.  552 600     I.V. (mL/kg)  1708.3 (25.8)      IV Piggyback 1000       Total Intake(mL/kg) 1000 (17.8) 2260.3 (34.1) 600 (10.5)     Net +1000 +2260.3 +600             Urine Unmeasured Occurrence  5 x 2 x          Lines, Drains & Airways     Active LDAs     Name Placement date Placement time Site Days    Peripheral IV 12/17/22 0118 Anterior;Proximal;Right Forearm 12/17/22  0118  Forearm  2              Current Facility-Administered Medications   Medication Dose Route Frequency Provider Last Rate Last Admin   • cyclobenzaprine (FLEXERIL) tablet 10 mg  10 mg Oral TID PRN Titus Farmer MD   10 mg at 12/17/22 1547   • HYDROmorphone (DILAUDID) injection 0.5 mg  0.5 mg Intravenous Q4H PRN Bob Barragan MD   0.5 mg at 12/19/22 0659   • lisinopril (PRINIVIL,ZESTRIL) tablet 20 mg  20 mg Oral Daily Sara Sanabria APRN   20 mg at 12/19/22 0829   • nitroglycerin (NITROSTAT) SL tablet 0.4 mg  0.4 mg Sublingual Q5 Min PRN Brittany Kessler APRN       • ondansetron (ZOFRAN) injection 4 mg  4 mg Intravenous Q6H PRN Brittany Kessler APRN   4 mg at 12/18/22 0909   • pantoprazole (PROTONIX) EC tablet 40 mg  40 mg Oral QAM Sara Sanabria APRN   40 mg at 12/19/22 0659   • PATIENT SUPPLIED MEDICATION  1 tablet Oral Daily Sara Sanabria APRN       • sertraline (ZOLOFT) tablet 50 mg  50 mg Oral Daily Sara Sanabria APRN   50 mg at 12/19/22 0829   • sodium chloride 0.9 % flush 10 mL  10 mL Intravenous PRN Sai Sanabria MD       • sodium chloride 0.9 % flush 10 mL  10 mL Intravenous Q12H Brittany Kessler APRN   10 mL at 12/19/22 0830   • sodium chloride 0.9 % flush  10 mL  10 mL Intravenous PRN Brittany Kessler APRN   10 mL at 12/17/22 0831   • sodium chloride 0.9 % infusion 40 mL  40 mL Intravenous PRN Brittany Kessler APRN       • sodium chloride 0.9 % infusion  100 mL/hr Intravenous Continuous Sara Sanabria APRN 100 mL/hr at 12/17/22 2040 100 mL/hr at 12/17/22 2040     Medication Administration Report for Talisha Zepeda as of 12/19/22 0921   Legend:    Given Hold Not Given Due Canceled Entry Other Actions    Time Time (Time) Time  Time-Action       Discontinued     Completed     Future     MAR Hold     Linked           Medications 12/17/22 12/18/22 12/19/22    cyclobenzaprine (FLEXERIL) tablet 10 mg  Dose: 10 mg  Freq: 3 Times Daily PRN Route: PO  PRN Reason: Muscle Spasms  Start: 12/17/22 1519    1547-Given          (0211)-Not Given              HYDROmorphone (DILAUDID) injection 0.5 mg  Dose: 0.5 mg  Freq: Every 4 Hours PRN Route: IV  PRN Reason: Severe Pain  Start: 12/17/22 0810   End: 12/24/22 0809   Admin Instructions:   Based on patient request - if ordered for moderate or severe pain, provider allows for administration of a medication prescribed for a lower pain scale.  If given for pain, use the following pain scale:  Mild Pain = Pain Score of 1-3, CPOT 1-2  Moderate Pain = Pain Score of 4-6, CPOT 3-4  Severe Pain = Pain Score of 7-10, CPOT 5-8    0828-Given     1231-Given     1641-Given       2047-Given          0211-Given     0908-Given [C]     1613-Given [C]       2011-Given          0123-Given     0659-Given            lisinopril (PRINIVIL,ZESTRIL) tablet 20 mg  Dose: 20 mg  Freq: Daily Route: PO  Start: 12/17/22 1145    1207-Given          0909-Given          0829-Given             nitroglycerin (NITROSTAT) SL tablet 0.4 mg  Dose: 0.4 mg  Freq: Every 5 Minutes PRN Route: SL  PRN Reason: Chest Pain  PRN Comment: Only if SBP Greater Than 100  Start: 12/17/22 0455   Admin Instructions:   If Pain Unrelieved After 3 Doses Notify MD           ondansetron (ZOFRAN) injection 4 mg  Dose: 4 mg  Freq: Every 6 Hours PRN Route: IV  PRN Reasons: Nausea,Vomiting  Start: 12/17/22 0455   Admin Instructions:   If BOTH ondansetron (ZOFRAN) and promethazine (PHENERGAN) are ordered use ondansetron first and THEN promethazine IF ondansetron is ineffective.     0909-Given              pantoprazole (PROTONIX) EC tablet 40 mg  Dose: 40 mg  Freq: Every Morning Route: PO  Start: 12/17/22 1145   Admin Instructions:   Swallow whole; do not crush, split, or chew.    1207-Given          0909-Given          0659-Given             PATIENT SUPPLIED MEDICATION  Dose: 1 tablet  Freq: Daily Route: PO  Start: 12/17/22 1145   Order specific questions:   Drug Name: sprintec 28 0.25-35 mg      (1208)-Not Given          (0900)-Not Given [C]          (0830)-Not Given             sertraline (ZOLOFT) tablet 50 mg  Dose: 50 mg  Freq: Daily Route: PO  Start: 12/17/22 1145    1206-Given          0909-Given          0829-Given             sodium chloride 0.9 % flush 10 mL  Dose: 10 mL  Freq: As Needed Route: IV  PRN Reason: Line Care  Start: 12/17/22 0454    0831-Given               sodium chloride 0.9 % flush 10 mL  Dose: 10 mL  Freq: Every 12 Hours Scheduled Route: IV  Start: 12/17/22 0900    0832-Given     2040-Given         0910-Given     2013-Given         0830-Given     2100            sodium chloride 0.9 % flush 10 mL  Dose: 10 mL  Freq: As Needed Route: IV  PRN Reason: Line Care  Start: 12/16/22 2008          sodium chloride 0.9 % infusion  Rate: 100 mL/hr Dose: 100 mL/hr  Freq: Continuous Route: IV  Start: 12/17/22 0854    0919-New Bag     1209-Currently Infusing     1430-Currently Infusing       2040-New Bag               sodium chloride 0.9 % infusion 40 mL  Dose: 40 mL  Freq: As Needed Route: IV  PRN Reason: Line Care  Start: 12/17/22 0454   Admin Instructions:   Following administration of an IV intermittent medication, flush line with 40mL NS at 100mL/hr.         Completed  Medications  Medications 12/17/22 12/18/22 12/19/22       HYDROmorphone (DILAUDID) injection 0.5 mg  Dose: 0.5 mg  Freq: Once Route: IV  Start: 12/17/22 0255   End: 12/17/22 0256   Admin Instructions:   Based on patient request - if ordered for moderate or severe pain, provider allows for administration of a medication prescribed for a lower pain scale.  If given for pain, use the following pain scale:  Mild Pain = Pain Score of 1-3, CPOT 1-2  Moderate Pain = Pain Score of 4-6, CPOT 3-4  Severe Pain = Pain Score of 7-10, CPOT 5-8    0256-Given               HYDROmorphone (DILAUDID) injection 0.5 mg  Dose: 0.5 mg  Freq: Once Route: IV  Start: 12/17/22 0047   End: 12/17/22 0122   Admin Instructions:   Based on patient request - if ordered for moderate or severe pain, provider allows for administration of a medication prescribed for a lower pain scale.  If given for pain, use the following pain scale:  Mild Pain = Pain Score of 1-3, CPOT 1-2  Moderate Pain = Pain Score of 4-6, CPOT 3-4  Severe Pain = Pain Score of 7-10, CPOT 5-8    0122-Given               iopamidol (ISOVUE-300) 61 % injection 100 mL  Dose: 100 mL  Freq: Once in Imaging Route: IV  Start: 12/17/22 0200   End: 12/17/22 0159    0159-Given               iopamidol (ISOVUE-370) 76 % injection 100 mL  Dose: 100 mL  Freq: Once in Imaging Route: IV  Start: 12/18/22 1245   End: 12/18/22 1157     1157-Given by Other              ondansetron (ZOFRAN) injection 4 mg  Dose: 4 mg  Freq: Once Route: IV  Start: 12/17/22 0047   End: 12/17/22 0121    0121-Given               sodium chloride 0.9 % bolus 1,000 mL  Dose: 1,000 mL  Freq: Once Route: IV  Start: 12/17/22 0047   End: 12/17/22 0356    0122-New Bag     0356-Stopped                         Lab Results (last 72 hours)     Procedure Component Value Units Date/Time    Basic Metabolic Panel [[id number]]  (Abnormal) Collected: 12/18/22 0921    Specimen: Blood Updated: 12/18/22 1014     Glucose 89 mg/dL      BUN 4 mg/dL       Creatinine 0.49 mg/dL      Sodium 139 mmol/L      Potassium 3.8 mmol/L      Chloride 107 mmol/L      CO2 28.1 mmol/L      Calcium 8.6 mg/dL      BUN/Creatinine Ratio 8.2     Anion Gap 3.9 mmol/L      eGFR 121.6 mL/min/1.73      Comment: National Kidney Foundation and American Society of Nephrology (ASN) Task Force recommended calculation based on the Chronic Kidney Disease Epidemiology Collaboration (CKD-EPI) equation refit without adjustment for race.       Narrative:      GFR Normal >60  Chronic Kidney Disease <60  Kidney Failure <15      CBC (No Diff) [[id number]]  (Abnormal) Collected: 12/18/22 0921    Specimen: Blood Updated: 12/18/22 0955     WBC 7.23 10*3/mm3      RBC 3.34 10*6/mm3      Hemoglobin 10.4 g/dL      Hematocrit 30.8 %      MCV 92.2 fL      MCH 31.1 pg      MCHC 33.8 g/dL      RDW 11.6 %      RDW-SD 39.9 fl      MPV 9.5 fL      Platelets 321 10*3/mm3     Basic Metabolic Panel [[id number]]  (Abnormal) Collected: 12/17/22 0828    Specimen: Blood Updated: 12/17/22 0912     Glucose 81 mg/dL      BUN 4 mg/dL      Creatinine 0.51 mg/dL      Sodium 140 mmol/L      Potassium 3.7 mmol/L      Chloride 106 mmol/L      CO2 29.1 mmol/L      Calcium 8.3 mg/dL      BUN/Creatinine Ratio 7.8     Anion Gap 4.9 mmol/L      eGFR 120.4 mL/min/1.73      Comment: National Kidney Foundation and American Society of Nephrology (ASN) Task Force recommended calculation based on the Chronic Kidney Disease Epidemiology Collaboration (CKD-EPI) equation refit without adjustment for race.       Narrative:      GFR Normal >60  Chronic Kidney Disease <60  Kidney Failure <15      CBC Auto Differential [[id number]]  (Abnormal) Collected: 12/17/22 0828    Specimen: Blood Updated: 12/17/22 0856     WBC 10.21 10*3/mm3      RBC 3.42 10*6/mm3      Hemoglobin 11.1 g/dL      Hematocrit 32.4 %      MCV 94.7 fL      MCH 32.5 pg      MCHC 34.3 g/dL      RDW 12.0 %      RDW-SD 41.1 fl      MPV 9.4 fL      Platelets 329 10*3/mm3      Neutrophil  % 74.8 %      Lymphocyte % 19.2 %      Monocyte % 5.0 %      Eosinophil % 0.3 %      Basophil % 0.2 %      Immature Grans % 0.5 %      Neutrophils, Absolute 7.64 10*3/mm3      Lymphocytes, Absolute 1.96 10*3/mm3      Monocytes, Absolute 0.51 10*3/mm3      Eosinophils, Absolute 0.03 10*3/mm3      Basophils, Absolute 0.02 10*3/mm3      Immature Grans, Absolute 0.05 10*3/mm3      nRBC 0.0 /100 WBC     Lactic Acid, Plasma [[id number]]  (Normal) Collected: 12/17/22 0313    Specimen: Blood Updated: 12/17/22 0337     Lactate 0.5 mmol/L     [place] Draw [[id number]] Collected: 12/16/22 2020    Specimen: Blood Updated: 12/16/22 2131    Narrative:      The following orders were created for panel order [place] Draw.  Procedure                               Abnormality         Status                     ---------                               -----------         ------                     Green Top (Gel)[[id number]]                                  Final result               Lavender Top[[id number]]                                     Final result               Gold Top - SST[[id number]]                                   Final result               Light Blue Top[[id number]]                                   Final result                 Please view results for these tests on the individual orders.    Green Top (Gel) [[id number]] Collected: 12/16/22 2020    Specimen: Blood Updated: 12/16/22 2131     Extra Tube Hold for add-ons.     Comment: Auto resulted.       Lavender Top [[id number]] Collected: 12/16/22 2020    Specimen: Blood Updated: 12/16/22 2131     Extra Tube hold for add-on     Comment: Auto resulted       Gold Top - SST [[id number]] Collected: 12/16/22 2020    Specimen: Blood Updated: 12/16/22 2131     Extra Tube Hold for add-ons.     Comment: Auto resulted.       Light Blue Top [[id number]] Collected: 12/16/22 2020    Specimen: Blood Updated: 12/16/22 2131     Extra Tube Hold for add-ons.     Comment: Auto resulted        Urinalysis With Microscopic If Indicated (No Culture) - Urine, Clean Catch [[id number]]  (Abnormal) Collected: 12/16/22 2029    Specimen: Urine, Clean Catch Updated: 12/16/22 2102     Color, UA Yellow     Appearance, UA Clear     pH, UA >=9.0     Specific Gravity, UA 1.022     Glucose, UA Negative     Ketones, UA Negative     Bilirubin, UA Negative     Blood, UA Negative     Protein, UA Negative     Leuk Esterase, UA Trace     Nitrite, UA Negative     Urobilinogen, UA 1.0 E.U./dL    Urinalysis, Microscopic Only - Urine, Clean Catch [[id number]]  (Abnormal) Collected: 12/16/22 2029    Specimen: Urine, Clean Catch Updated: 12/16/22 2102     RBC, UA None Seen /HPF      WBC, UA 0-2 /HPF      Bacteria, UA Trace /HPF      Squamous Epithelial Cells, UA 0-2 /HPF      Hyaline Casts, UA None Seen /LPF      Methodology Manual Light Microscopy    Lipase [[id number]]  (Normal) Collected: 12/16/22 2020    Specimen: Blood Updated: 12/16/22 2051     Lipase 24 U/L     Comprehensive Metabolic Panel [[id number]]  (Abnormal) Collected: 12/16/22 2020    Specimen: Blood Updated: 12/16/22 2051     Glucose 108 mg/dL      BUN 5 mg/dL      Creatinine 0.55 mg/dL      Sodium 136 mmol/L      Potassium 3.9 mmol/L      Chloride 104 mmol/L      CO2 27.0 mmol/L      Calcium 8.6 mg/dL      Total Protein 6.8 g/dL      Albumin 4.00 g/dL      ALT (SGPT) 36 U/L      AST (SGOT) 40 U/L      Alkaline Phosphatase 214 U/L      Total Bilirubin 0.2 mg/dL      Globulin 2.8 gm/dL      A/G Ratio 1.4 g/dL      BUN/Creatinine Ratio 9.1     Anion Gap 5.0 mmol/L      eGFR 118.3 mL/min/1.73      Comment: National Kidney Foundation and American Society of Nephrology (ASN) Task Force recommended calculation based on the Chronic Kidney Disease Epidemiology Collaboration (CKD-EPI) equation refit without adjustment for race.       Narrative:      GFR Normal >60  Chronic Kidney Disease <60  Kidney Failure <15      CBC & Differential [[id number]]  (Abnormal) Collected: 12/16/22  2020    Specimen: Blood Updated: 12/16/22 2035    Narrative:      The following orders were created for panel order CBC & Differential.  Procedure                               Abnormality         Status                     ---------                               -----------         ------                     CBC Auto Differential[[id number]]        Abnormal            Final result                 Please view results for these tests on the individual orders.    CBC Auto Differential [[id number]]  (Abnormal) Collected: 12/16/22 2020    Specimen: Blood Updated: 12/16/22 2035     WBC 14.92 10*3/mm3      RBC 3.95 10*6/mm3      Hemoglobin 12.4 g/dL      Hematocrit 36.1 %      MCV 91.4 fL      MCH 31.4 pg      MCHC 34.3 g/dL      RDW 11.6 %      RDW-SD 39.6 fl      MPV 9.3 fL      Platelets 392 10*3/mm3      Neutrophil % 87.0 %      Lymphocyte % 8.9 %      Monocyte % 3.2 %      Eosinophil % 0.1 %      Basophil % 0.2 %      Immature Grans % 0.6 %      Neutrophils, Absolute 12.99 10*3/mm3      Lymphocytes, Absolute 1.33 10*3/mm3      Monocytes, Absolute 0.47 10*3/mm3      Eosinophils, Absolute 0.01 10*3/mm3      Basophils, Absolute 0.03 10*3/mm3      Immature Grans, Absolute 0.09 10*3/mm3      nRBC 0.0 /100 WBC           Imaging Results (Last 72 Hours)     Procedure Component Value Units Date/Time    CT Angiogram Abdomen Pelvis [[id number]] Collected: 12/18/22 1225     Updated: 12/18/22 1235    Narrative:      CT ANGIOGRAM ABDOMEN PELVIS-     INDICATIONS: Regional enteritis versus small bowel ischemia.  Radiation  dose reduction techniques were utilized, including automated exposure  control and exposure modulation based on body size.     TECHNIQUE: CT angiography of the abdomen or pelvis. Three-dimensional  reconstructions. NASCET criteria.     COMPARISON: 12/17/2022     FINDINGS:     Vascular:     The abdominal aorta shows no aneurysm, dissection, or significant  stenosis.     Patent celiac artery, superior mesenteric  artery, bilateral renal  arteries, inferior mesenteric artery, iliac arteries, partly included  femoral arteries.           Abdomen pelvis:     Otherwise unremarkable appearance of the liver, spleen, adrenal glands,  pancreas, kidneys, bladder.     No bowel obstruction. Previous jejunal wall thickening has markedly  diminished. No free intraperitoneal gas. Pelvic free fluid is again  demonstrated.     Scattered small mesenteric and para-aortic lymph nodes are seen that are  not significant by size criteria.     The lung bases show increased consolidations in the lingula and left  lower lobe, compatible with pneumonia. Minimal left pleural effusion is  developed. Minimal likely atelectasis in the right lower lung, trace  right pleural effusion.     No acute fracture is identified.             Impression:         1. No significant focal arterial stenosis was identified.  2. Pneumonia in the left lower lobe and lingula, with minimal pleural  effusions, follow-up recommended.  3. Interval improvement of previous jejunal wall thickening     This report was finalized on 12/18/2022 12:32 PM by Dr. Gera Davalos M.D.       CT Abdomen Pelvis With Contrast [[id number]] Collected: 12/17/22 0301     Updated: 12/17/22 0301    Narrative:      [facility]  Patient: KLARISSA CHAN  Time Out: 03:00  Exam(s): CT ABDOMEN + PELVIS With Contrast     EXAM:    CT Abdomen and Pelvis With Intravenous Contrast    CLINICAL HISTORY:     Reason for exam: Abdominal pain, acute, nonlocalized.    TECHNIQUE:    Axial computed tomography images of the abdomen and pelvis with   intravenous contrast.  CTDI is 8.1 mGy and DLP is 368.1 mGy-cm.  This CT   exam was performed according to the principle of ALARA (As Low As   Reasonably Achievable) by using one or more of the following dose   reduction techniques: automated exposure control, adjustment of the mA   and or kV according to patient size, and or use of iterative   reconstruction  technique.    COMPARISON:    No relevant prior studies available.    FINDINGS:    Lung bases:  Unremarkable.  No mass.  No consolidation.     ABDOMEN:    Liver:  Unremarkable.  No mass.    Gallbladder and bile ducts:  Unremarkable.  No calcified stones.  No   ductal dilation.    Pancreas:  Unremarkable.  No mass.  No ductal dilation.    Spleen:  Unremarkable.  No splenomegaly.    Adrenals:  Unremarkable.  No mass.    Kidneys and ureters:  Unremarkable.  No solid mass.  No hydronephrosis.    Stomach and bowel:  Loop of jejunum in the left lower quadrant abdomen   demonstrates abnormal diffuse circumferential wall thickening and   mesenteric edema.  No findings of bowel obstruction.     PELVIS:    Appendix:  The appendix is normal.    Bladder:  Unremarkable.  No mass.    Reproductive:  Unremarkable as visualized.     ABDOMEN and PELVIS:    Intraperitoneal space:  Small volume ascites.  No free air.    Bones joints:  No acute fracture.  No dislocation.    Soft tissues:  Unremarkable.    Vasculature:  Unremarkable.  No abdominal aortic aneurysm.    Lymph nodes:  Unremarkable.  No enlarged lymph nodes.    IMPRESSION:         Wall thickening in the jejunum with mesenteric edema are findings   suggestive of regional enteritis with a differential diagnosis of small   bowel ischemia.  Correlate clinically.      Impression:            Communications:    12 17 22 02:59 Call Doctor Regarding Above results, called  Dr. Sanabria on   12 17 02:59 (-05:00)    Electronically signed by Morgan Guzman MD on 12-17-22 at 0300          Orders (last 72 hrs)      Start     Ordered    12/19/22 0600  CBC (No Diff)  Morning Draw         12/18/22 1109    12/19/22 0600  Basic Metabolic Panel  Morning Draw         12/18/22 1109    12/18/22 1245  iopamidol (ISOVUE-370) 76 % injection 100 mL  Once in Imaging         12/18/22 1157    12/18/22 1109  Apply Heat To Affected Area  Once         12/18/22 1109    12/18/22 1103  CT Angiogram Abdomen  Pelvis  1 Time Imaging         12/18/22 1102    12/18/22 0600  CBC (No Diff)  Morning Draw         12/17/22 1144    12/18/22 0600  Basic Metabolic Panel  Morning Draw         12/17/22 1144    12/17/22 1527  CT Angiogram Abdomen Pelvis  1 Time Imaging,   Status:  Canceled         12/17/22 1527    12/17/22 1519  cyclobenzaprine (FLEXERIL) tablet 10 mg  3 Times Daily PRN         12/17/22 1527    12/17/22 1519  Gamma GT  Once         12/17/22 1518    12/17/22 1518  Diet: Liquid Diets; Full Liquid; Texture: Regular Texture (IDDSI 7); Fluid Consistency: Thin (IDDSI 0)  Diet Effective Now         12/17/22 1517    12/17/22 1518  Apply Heat To Affected Area  Once        Comments: Heating pad to abdomen    12/17/22 1517    12/17/22 1145  lisinopril (PRINIVIL,ZESTRIL) tablet 20 mg  Daily         12/17/22 1143    12/17/22 1145  PATIENT SUPPLIED MEDICATION  Daily         12/17/22 1143    12/17/22 1145  pantoprazole (PROTONIX) EC tablet 40 mg  Every Morning         12/17/22 1143    12/17/22 1145  sertraline (ZOLOFT) tablet 50 mg  Daily         12/17/22 1143    12/17/22 0900  sodium chloride 0.9 % flush 10 mL  Every 12 Hours Scheduled         12/17/22 0456    12/17/22 0854  sodium chloride 0.9 % infusion  Continuous         12/17/22 0852    12/17/22 0810  HYDROmorphone (DILAUDID) injection 0.5 mg  Every 4 Hours PRN         12/17/22 0810    12/17/22 0800  Vital Signs  Every 4 Hours       12/17/22 0456    12/17/22 0800  Oral Care  2 Times Daily       12/17/22 0456    12/17/22 0600  Basic Metabolic Panel  Morning Draw         12/17/22 0456    12/17/22 0600  CBC Auto Differential  Morning Draw         12/17/22 0456    12/17/22 0457  Daily Weights  Daily       12/17/22 0456    12/17/22 0457  Place Sequential Compression Device  Once         12/17/22 0456    12/17/22 0457  Maintain Sequential Compression Device  Continuous         12/17/22 0456    12/17/22 0457  Code Status and Medical Interventions:  Continuous         12/17/22 0456     12/17/22 0457  Inpatient Gastroenterology Consult  Once        Specialty:  Gastroenterology  Provider:  Lola Crandall MD    12/17/22 0458 12/17/22 0456  Cardiac Monitoring  Continuous        Comments: Follow Standing Orders As Outlined in Process Instructions (Open Order Report to View Full Instructions)    12/17/22 0456 12/17/22 0456  Pulse Oximetry, Continuous  Continuous         12/17/22 0456 12/17/22 0456  Fall Precautions  Continuous         12/17/22 0456 12/17/22 0456  NPO Diet NPO Type: Strict NPO  Diet Effective Now,   Status:  Canceled         12/17/22 0456 12/17/22 0455  ondansetron (ZOFRAN) injection 4 mg  Every 6 Hours PRN         12/17/22 0456 12/17/22 0455  Intake & Output  Every Shift       12/17/22 0456 12/17/22 0455  Weigh Patient  Once         12/17/22 0456 12/17/22 0455  Oxygen Therapy- Nasal Cannula; Titrate for SPO2: 90% - 95%  Continuous,   Status:  Canceled         12/17/22 0456 12/17/22 0455  Insert Peripheral IV  Once         12/17/22 0456 12/17/22 0455  Saline Lock & Maintain IV Access  Continuous,   Status:  Canceled         12/17/22 0456 12/17/22 0455  Telemetry - Maintain IV Access  Continuous         12/17/22 0456 12/17/22 0455  Continuous Cardiac Monitoring  Continuous,   Status:  Canceled        Comments: Follow Standing Orders As Outlined in Process Instructions (Open Order Report to View Full Instructions)    12/17/22 0456 12/17/22 0455  Telemetry - Pulse Oximetry  Continuous PRN,   Status:  Canceled      Comments: If Patient Develops Unresponsiveness, Acute Dyspnea, Cyanosis or Suspected Hypoxemia Start Continuous Pulse Ox Monitoring, Apply Oxygen & Notify Provider    12/17/22 0456 12/17/22 0455  nitroglycerin (NITROSTAT) SL tablet 0.4 mg  Every 5 Minutes PRN         12/17/22 0456 12/17/22 0455  May Be Off Telemetry for Tests  Continuous         12/17/22 0456 12/17/22 0455  Notify Provider if ACLS Protocol Activated  Until  Discontinued         12/17/22 0456    12/17/22 0454  sodium chloride 0.9 % flush 10 mL  As Needed         12/17/22 0456    12/17/22 0454  sodium chloride 0.9 % infusion 40 mL  As Needed         12/17/22 0456    12/17/22 0454  Inpatient Admission  Once         12/17/22 0453    12/17/22 0454  Cardiac Monitoring  Continuous,   Status:  Canceled        Comments: Follow Standing Orders As Outlined in Process Instructions (Open Order Report to View Full Instructions)    12/17/22 0453    12/17/22 0453  Initiate Observation Status  Once         12/17/22 0453    12/17/22 0444  [facility] (on-call MD unless specified) Details  Once        Specialty:  Hospitalist  Provider:  (Not yet assigned)    12/17/22 0444    12/17/22 0305  Lactic Acid, Plasma  Once         12/17/22 0304    12/17/22 0255  HYDROmorphone (DILAUDID) injection 0.5 mg  Once         12/17/22 0253    12/17/22 0200  iopamidol (ISOVUE-300) 61 % injection 100 mL  Once in Imaging         12/17/22 0158    12/17/22 0047  sodium chloride 0.9 % bolus 1,000 mL  Once         12/17/22 0045    12/17/22 0047  HYDROmorphone (DILAUDID) injection 0.5 mg  Once         12/17/22 0045    12/17/22 0047  ondansetron (ZOFRAN) injection 4 mg  Once         12/17/22 0045    12/17/22 0046  CT Abdomen Pelvis With Contrast  1 Time Imaging        Comments: IV CONTRAST ONLY      12/17/22 0045    12/16/22 2048  Urinalysis, Microscopic Only - Urine, Clean Catch  Once         12/16/22 2047 12/16/22 2009  NPO Diet NPO Type: Strict NPO  Diet Effective Now,   Status:  Canceled         12/16/22 2008 12/16/22 2009  Undress & Gown  Once         12/16/22 2008 12/16/22 2009  Insert Peripheral IV  Once         12/16/22 2008 12/16/22 2009  [place] Draw  Once         12/16/22 2008 12/16/22 2009  CBC & Differential  Once         12/16/22 2008 12/16/22 2009  Comprehensive Metabolic Panel  Once         12/16/22 2008 12/16/22 2009  Lipase  Once         12/16/22 2008 12/16/22 2009  Urinalysis  With Microscopic If Indicated (No Culture) - Urine, Clean Catch  Once         12/16/22 2008 12/16/22 2009  Green Top (Gel)  PROCEDURE ONCE         12/16/22 2008 12/16/22 2009  Lavender Top  PROCEDURE ONCE         12/16/22 2008 12/16/22 2009  Gold Top - SST  PROCEDURE ONCE         12/16/22 2008 12/16/22 2009  Light Blue Top  PROCEDURE ONCE         12/16/22 2008 12/16/22 2009  CBC Auto Differential  PROCEDURE ONCE         12/16/22 2008 12/16/22 2008  sodium chloride 0.9 % flush 10 mL  As Needed         12/16/22 2008    Unscheduled  Oxygen Therapy- Nasal Cannula; Titrate for SPO2: 90% - 95%  Continuous PRN      Comments: If Patient Develops Unresponsiveness, Acute Dyspnea, Cyanosis or Suspected Hypoxemia Start Continuous Pulse Ox Monitoring, Apply Oxygen & Notify Provider    12/17/22 0456    Unscheduled  ECG 12 Lead Other; Acute Chest Pain or Dysrhythmia  As Needed      Comments: Nurse to Release if Patient Expericences Acute Chest Pain or Dysrhythmias    12/17/22 0456    Unscheduled  Potassium  As Needed      Comments: For Ventricular Arrhythmias      12/17/22 0456    Unscheduled  Magnesium  As Needed      Comments: For Ventricular Arrhythmias      12/17/22 0456    Unscheduled  Troponin  As Needed      Comments: For Chest Pain      12/17/22 0456    Unscheduled  Blood Gas, Arterial -  As Needed      Comments: Draw for Acute Dyspnea, Cyanosis, Suspected Hypoxemia or UnresponsivenessNotify Provider of Results      12/17/22 0456    Unscheduled  Initiate ACLS Protocol For Life Threatening Dysrhythmias (If Appropriate for Patient)  As Needed       12/17/22 0456                   Physician Progress Notes (last 72 hours)      Nicolle Yang APRN at 12/18/22 1102     Attestation signed by Titus Farmer MD at 12/18/22 1806    I have reviewed this documentation and agree.I have reviewed the chart, seen and examined patient with Nicolle Yang nurse practioner.  I have performed more that 75% of  assessment  and medical decision making  patient is feeling better, abdominal pain worse with movement.  Abdomen with mild epigastric tenderness, and along rectus muscle positive carnett sign.  The cta reviewed NO ischemia and enteritis improving which suggest this is a self limited process and the ct findings likely sequalae of the \"flu\" she had.   Dx is abdominal wall pain, supportive care. Since improvement on imaging do not feel small bowel series is needed now.  Ok to send home from [facility] if improvement continues  bga returns tomorrow.                    LOS: 1 day   Patient Care Team:  John Taylor MD as PCP - General (Internal Medicine)  Jes, Elvis Alicea MD as Consulting Physician (Obstetrics and Gynecology)      Subjective     Interval History: no new events. CTA has not been done yet.    Subjective: she feels a bit better but is still very tender with light palpation. Tolerating diet.      ROS:   Review of Systems   Gastrointestinal: Positive for abdominal pain.   All other systems reviewed and are negative.         Medication Review:     Current Facility-Administered Medications:   •  cyclobenzaprine (FLEXERIL) tablet 10 mg, 10 mg, Oral, TID PRN, Titus Farmer MD, 10 mg at 12/17/22 1547  •  HYDROmorphone (DILAUDID) injection 0.5 mg, 0.5 mg, Intravenous, Q4H PRN, Bob Barragan MD, 0.5 mg at 12/18/22 0908  •  lisinopril (PRINIVIL,ZESTRIL) tablet 20 mg, 20 mg, Oral, Daily, Sara Sanabria APRN, 20 mg at 12/18/22 0909  •  nitroglycerin (NITROSTAT) SL tablet 0.4 mg, 0.4 mg, Sublingual, Q5 Min PRN, Brittany Kessler APRN  •  ondansetron (ZOFRAN) injection 4 mg, 4 mg, Intravenous, Q6H PRN, Brittany Kessler APRN, 4 mg at 12/18/22 0909  •  pantoprazole (PROTONIX) EC tablet 40 mg, 40 mg, Oral, QAM, Sara Sanabria APRN, 40 mg at 12/18/22 0909  •  PATIENT SUPPLIED MEDICATION, 1 tablet, Oral, Daily, Daniele, Sara Dary, APRN  •  sertraline (ZOLOFT) tablet 50  mg, 50 mg, Oral, Daily, Sara Sanabria APRN, 50 mg at 12/18/22 0909  •  sodium chloride 0.9 % flush 10 mL, 10 mL, Intravenous, PRN, Sai Sanabria MD  •  sodium chloride 0.9 % flush 10 mL, 10 mL, Intravenous, Q12H, Brittany Kessler APRN, 10 mL at 12/18/22 0910  •  sodium chloride 0.9 % flush 10 mL, 10 mL, Intravenous, PRN, Brittany Kessler, APRN, 10 mL at 12/17/22 0831  •  sodium chloride 0.9 % infusion 40 mL, 40 mL, Intravenous, PRN, Brittany Kessler APRN  •  sodium chloride 0.9 % infusion, 100 mL/hr, Intravenous, Continuous, Sara Sanabria APRN, Last Rate: 100 mL/hr at 12/17/22 2040, 100 mL/hr at 12/17/22 2040      Objective     Vital Signs  Vitals:    12/17/22 1940 12/17/22 2338 12/18/22 0656 12/18/22 0908   BP: 98/62 101/68 118/67    BP Location: Left arm Left arm Right arm    Patient Position: Lying Lying Lying    Pulse: 69 65 72 62   Resp: 16 16 18    Temp: 97.3 °F (36.3 °C) 97.7 °F (36.5 °C) 98.3 °F (36.8 °C)    TempSrc: Oral Oral Oral    SpO2: 95% 96% 99%    Weight:       Height:           Physical Exam: ambulating in room    General Appearance:    Awake and alert, in no acute distress   Head:    Normocephalic, without obvious abnormality   Eyes:          Conjunctivae normal, anicteric sclera   Throat:   No oral lesions, no thrush, oral mucosa moist   Neck:   No adenopathy, supple, no JVD   Lungs:     Respirations regular, even and unlabored   Abdomen:     Soft, tender with light palpation, non-distended, no rebound or guarding, no hepatosplenomegaly   Rectal:     Deferred   Extremities:   No edema, no cyanosis, moves equally bilaterally   Skin:   No bruising, no rash, no jaundice        Results Review:    CBC  Results from last 7 days   Lab Units 12/18/22  0921 12/17/22  0828 12/16/22 2020   RBC 10*6/mm3 3.34* 3.42* 3.95   WBC 10*3/mm3 7.23 10.21 14.92*   HEMOGLOBIN g/dL 10.4* 11.1* 12.4   PLATELETS 10*3/mm3 321 329 392       CMP  Results from last 7 days    Lab Units 12/18/22 0921 12/17/22  0828 12/16/22 2020   SODIUM mmol/L 139 140 136   POTASSIUM mmol/L 3.8 3.7 3.9   CHLORIDE mmol/L 107 106 104   CO2 mmol/L 28.1 29.1* 27.0   BUN mg/dL 4* 4* 5*   CREATININE mg/dL 0.49* 0.51* 0.55*   GLUCOSE mg/dL 89 81 108*   ALBUMIN g/dL  --   --  4.00   BILIRUBIN mg/dL  --   --  0.2   ALK PHOS U/L  --   --  214*   AST (SGOT) U/L  --   --  40*   ALT (SGPT) U/L  --   --  36*       Amylase and Lipase  Results from last 7 days   Lab Units 12/16/22 2020   LIPASE U/L 24       CRP         Imaging Results (Last 24 Hours)     ** No results found for the last 24 hours. **            ASSESSMENT:  42 y.o. female with history of migraines and GERD who presents with c/o abdominal pain.   -Abdominal pain -suspicious for abdominal wall pain. ddx include SB ischemia given recent CT  -Abnormal CT of abdomen - ddx bowel enteritis versus ischemia.  -Recent flu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

## 2022-12-19 NOTE — PLAN OF CARE
Goal Outcome Evaluation:  Plan of Care Reviewed With: patient            Pt is alert and oriented X 4. Pt's vital signs have remained stable for the extent of the shift. Pt made some complaints of pain and was medicated appropriately, see MAR.  No indicators of distress noted this shift, will continue to monitor.

## 2022-12-19 NOTE — PLAN OF CARE
Goal Outcome Evaluation:  Plan of Care Reviewed With: patient        Progress: no change  Outcome Evaluation: Pt sitting in r/c, pain improved after prn Dilaudid inj given earlier  , pain subsided, no nausea, ate reg food and gaudencio well, prn pain med switch to po form, pt informed, up to br .gave self shower, ivf continous, sr on tele, reminded pt safety precautions, up ad david, nad, abd soft, rlq tender, bs audible.

## 2022-12-20 ENCOUNTER — READMISSION MANAGEMENT (OUTPATIENT)
Dept: CALL CENTER | Facility: HOSPITAL | Age: 42
End: 2022-12-20

## 2022-12-20 VITALS
HEIGHT: 62 IN | TEMPERATURE: 97.9 F | SYSTOLIC BLOOD PRESSURE: 116 MMHG | RESPIRATION RATE: 18 BRPM | BODY MASS INDEX: 23.83 KG/M2 | DIASTOLIC BLOOD PRESSURE: 74 MMHG | WEIGHT: 129.5 LBS | OXYGEN SATURATION: 97 % | HEART RATE: 65 BPM

## 2022-12-20 PROBLEM — D72.829 LEUKOCYTOSIS: Status: RESOLVED | Noted: 2022-12-17 | Resolved: 2022-12-20

## 2022-12-20 LAB
ANION GAP SERPL CALCULATED.3IONS-SCNC: 8.3 MMOL/L (ref 5–15)
BASOPHILS # BLD AUTO: 0.02 10*3/MM3 (ref 0–0.2)
BASOPHILS NFR BLD AUTO: 0.3 % (ref 0–1.5)
BUN SERPL-MCNC: 4 MG/DL (ref 6–20)
BUN/CREAT SERPL: 8.2 (ref 7–25)
CALCIUM SPEC-SCNC: 8.4 MG/DL (ref 8.6–10.5)
CHLORIDE SERPL-SCNC: 106 MMOL/L (ref 98–107)
CO2 SERPL-SCNC: 25.7 MMOL/L (ref 22–29)
CREAT SERPL-MCNC: 0.49 MG/DL (ref 0.57–1)
DEPRECATED RDW RBC AUTO: 40.6 FL (ref 37–54)
EGFRCR SERPLBLD CKD-EPI 2021: 121.6 ML/MIN/1.73
EOSINOPHIL # BLD AUTO: 0.03 10*3/MM3 (ref 0–0.4)
EOSINOPHIL NFR BLD AUTO: 0.5 % (ref 0.3–6.2)
ERYTHROCYTE [DISTWIDTH] IN BLOOD BY AUTOMATED COUNT: 11.7 % (ref 12.3–15.4)
GLUCOSE SERPL-MCNC: 88 MG/DL (ref 65–99)
HCT VFR BLD AUTO: 33 % (ref 34–46.6)
HGB BLD-MCNC: 11.2 G/DL (ref 12–15.9)
IMM GRANULOCYTES # BLD AUTO: 0.02 10*3/MM3 (ref 0–0.05)
IMM GRANULOCYTES NFR BLD AUTO: 0.3 % (ref 0–0.5)
LYMPHOCYTES # BLD AUTO: 1.76 10*3/MM3 (ref 0.7–3.1)
LYMPHOCYTES NFR BLD AUTO: 30.7 % (ref 19.6–45.3)
MCH RBC QN AUTO: 32.4 PG (ref 26.6–33)
MCHC RBC AUTO-ENTMCNC: 33.9 G/DL (ref 31.5–35.7)
MCV RBC AUTO: 95.4 FL (ref 79–97)
MONOCYTES # BLD AUTO: 0.29 10*3/MM3 (ref 0.1–0.9)
MONOCYTES NFR BLD AUTO: 5.1 % (ref 5–12)
NEUTROPHILS NFR BLD AUTO: 3.62 10*3/MM3 (ref 1.7–7)
NEUTROPHILS NFR BLD AUTO: 63.1 % (ref 42.7–76)
NRBC BLD AUTO-RTO: 0 /100 WBC (ref 0–0.2)
PLATELET # BLD AUTO: 366 10*3/MM3 (ref 140–450)
PMV BLD AUTO: 9.6 FL (ref 6–12)
POTASSIUM SERPL-SCNC: 3.7 MMOL/L (ref 3.5–5.2)
PROCALCITONIN SERPL-MCNC: <0.02 NG/ML (ref 0–0.25)
RBC # BLD AUTO: 3.46 10*6/MM3 (ref 3.77–5.28)
SODIUM SERPL-SCNC: 140 MMOL/L (ref 136–145)
WBC NRBC COR # BLD: 5.74 10*3/MM3 (ref 3.4–10.8)

## 2022-12-20 PROCEDURE — 85025 COMPLETE CBC W/AUTO DIFF WBC: CPT | Performed by: STUDENT IN AN ORGANIZED HEALTH CARE EDUCATION/TRAINING PROGRAM

## 2022-12-20 PROCEDURE — 99232 SBSQ HOSP IP/OBS MODERATE 35: CPT | Performed by: INTERNAL MEDICINE

## 2022-12-20 PROCEDURE — 80048 BASIC METABOLIC PNL TOTAL CA: CPT | Performed by: STUDENT IN AN ORGANIZED HEALTH CARE EDUCATION/TRAINING PROGRAM

## 2022-12-20 PROCEDURE — 84145 PROCALCITONIN (PCT): CPT | Performed by: STUDENT IN AN ORGANIZED HEALTH CARE EDUCATION/TRAINING PROGRAM

## 2022-12-20 RX ORDER — CYCLOBENZAPRINE HCL 10 MG
10 TABLET ORAL 3 TIMES DAILY PRN
Qty: 42 TABLET | Refills: 0 | Status: SHIPPED | OUTPATIENT
Start: 2022-12-20 | End: 2023-01-03

## 2022-12-20 RX ORDER — HYDROCODONE BITARTRATE AND ACETAMINOPHEN 5; 325 MG/1; MG/1
1 TABLET ORAL EVERY 6 HOURS PRN
Qty: 8 TABLET | Refills: 0 | Status: SHIPPED | OUTPATIENT
Start: 2022-12-20 | End: 2022-12-22

## 2022-12-20 RX ORDER — HYDROCODONE BITARTRATE AND ACETAMINOPHEN 5; 325 MG/1; MG/1
1 TABLET ORAL EVERY 6 HOURS PRN
Qty: 8 TABLET | Refills: 0 | Status: SHIPPED | OUTPATIENT
Start: 2022-12-20 | End: 2022-12-20 | Stop reason: SDUPTHER

## 2022-12-20 RX ORDER — CYCLOBENZAPRINE HCL 10 MG
10 TABLET ORAL 3 TIMES DAILY PRN
Qty: 42 TABLET | Refills: 0 | Status: SHIPPED | OUTPATIENT
Start: 2022-12-20 | End: 2022-12-20 | Stop reason: SDUPTHER

## 2022-12-20 RX ADMIN — CYCLOBENZAPRINE 10 MG: 10 TABLET, FILM COATED ORAL at 10:59

## 2022-12-20 RX ADMIN — PANTOPRAZOLE SODIUM 40 MG: 40 TABLET, DELAYED RELEASE ORAL at 08:59

## 2022-12-20 RX ADMIN — HYDROCODONE BITARTRATE AND ACETAMINOPHEN 1 TABLET: 5; 325 TABLET ORAL at 03:45

## 2022-12-20 RX ADMIN — SERTRALINE HYDROCHLORIDE 50 MG: 50 TABLET, FILM COATED ORAL at 08:59

## 2022-12-20 RX ADMIN — LISINOPRIL 20 MG: 20 TABLET ORAL at 08:59

## 2022-12-20 RX ADMIN — SODIUM CHLORIDE 100 ML/HR: 9 INJECTION, SOLUTION INTRAVENOUS at 03:46

## 2022-12-20 RX ADMIN — Medication 10 ML: at 09:00

## 2022-12-20 NOTE — PROGRESS NOTES
Name: Talisha Zepeda ADMIT: 2022   : 1980  PCP: John Taylor MD    MRN: [id number] LOS: 2 days   AGE/SEX: 42 y.o. female  ROOM: [facility]     Subjective   Subjective   ABD pain improved today.      Objective   Objective   Vital Signs  Temp:  [97.8 °F (36.6 °C)-98.4 °F (36.9 °C)] 98.4 °F (36.9 °C)  Heart Rate:  [65-76] 76  Resp:  [18] 18  BP: (112-146)/(60-88) 133/71  SpO2:  [96 %-97 %] 97 %  on   ;   Device (Oxygen Therapy): room air  Body mass index is 23.16 kg/m².  Physical Exam  Constitutional:       Appearance: Normal appearance.   Cardiovascular:      Rate and Rhythm: Normal rate and regular rhythm.      Heart sounds: No murmur heard.    No gallop.   Pulmonary:      Effort: Pulmonary effort is normal. No respiratory distress.      Breath sounds: Normal breath sounds. No wheezing or rales.   Abdominal:      General: Abdomen is flat. Bowel sounds are normal. There is no distension.      Palpations: Abdomen is soft.      Tenderness: There is abdominal tenderness. There is no guarding or rebound.      Hernia: No hernia is present.   Neurological:      General: No focal deficit present.      Mental Status: She is alert and oriented to person, place, and time.   Psychiatric:         Mood and Affect: Mood normal.         Behavior: Behavior normal.         Results Review     I reviewed the patient's new clinical results.  Results from last 7 days   Lab Units 22   WBC 10*3/mm3 5.79 7.23 10.21 14.92*   HEMOGLOBIN g/dL 10.3* 10.4* 11.1* 12.4   PLATELETS 10*3/mm3 323 321 329 392     Results from last 7 days   Lab Units 22   SODIUM mmol/L 137 139 140 136   POTASSIUM mmol/L 3.8 3.8 3.7 3.9   CHLORIDE mmol/L 105 107 106 104   CO2 mmol/L 25.9 28.1 29.1* 27.0   BUN mg/dL 4* 4* 4* 5*   CREATININE mg/dL 0.45* 0.49* 0.51* 0.55*   GLUCOSE mg/dL 72 89 81 108*   EGFR mL/min/1.73 124.1 121.6 120.4  118.3     Results from last 7 days   Lab Units 12/16/22 2020   ALBUMIN g/dL 4.00   BILIRUBIN mg/dL 0.2   ALK PHOS U/L 214*   AST (SGOT) U/L 40*   ALT (SGPT) U/L 36*     Results from last 7 days   Lab Units 12/19/22  0721 12/18/22  0921 12/17/22  0828 12/16/22 2020   CALCIUM mg/dL 8.3* 8.6 8.3* 8.6   ALBUMIN g/dL  --   --   --  4.00     Results from last 7 days   Lab Units 12/17/22  0313   LACTATE mmol/L 0.5     No results found for: HGBA1C, POCGLU    CT Angiogram Abdomen Pelvis    Result Date: 12/18/2022   1. No significant focal arterial stenosis was identified. 2. Pneumonia in the left lower lobe and lingula, with minimal pleural effusions, follow-up recommended. 3. Interval improvement of previous jejunal wall thickening  This report was finalized on 12/18/2022 12:32 PM by Dr. Gera Davalos M.D.      Scheduled Medications  lisinopril, 20 mg, Oral, Daily  [START ON 12/20/2022] norgestimate-ethinyl estradiol, 1 tablet, Oral, Daily  pantoprazole, 40 mg, Oral, QAM  sertraline, 50 mg, Oral, Daily  sodium chloride, 10 mL, Intravenous, Q12H    Infusions  sodium chloride, 100 mL/hr, Last Rate: 100 mL/hr (12/19/22 1753)    Diet  Diet: Regular/House Diet, Gastrointestinal Diets; Low Irritant; Texture: Regular Texture (IDDSI 7); Fluid Consistency: Thin (IDDSI 0)       Assessment/Plan     Active Hospital Problems    Diagnosis  POA   • **Generalized abdominal pain [R10.84]  Yes   • Leukocytosis [D72.829]  Yes   • Hypertension [I10]  Yes   • 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       42 y.o. female admitted with Generalized abdominal pain.    Generalized ABD pain  Enteritis  -CT A/P (12/18/22): interval improvement of previous jejunal wall thickening  -suspect viral origin  -GI following  -advance to regular diet today  -dc Dilaudid, switch to PO pain meds    Abnormal chest conslidatino  -CT Chest w/ PNA in LLL and lingula, minimal pleural  effusions; f/u recommended  -remains clinically stable without symptoms, no leukocytosis  -will CTM off of abx    HTN  -lisinopril 20mg    Depression  -Zoloft 50mg    · SCDs for DVT prophylaxis.  · Full code.  · Discussed with patient.  · Anticipate discharge home when cleared by consultants.      Osbaldo Browning MD  [facility]  12/19/22  19:50 EST
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
## 2022-12-20 NOTE — OUTREACH NOTE
Prep Survey    Flowsheet Row Responses   [facility] patient discharged from? [place]   Is LACE score < 7 ? No   Eligibility [facility]   Date of Admission 12/17/22   Date of Discharge 12/20/22   Discharge Disposition Home or Self Care   Discharge diagnosis Generalized abdominal pain    Does the patient have one of the following disease processes/diagnoses(primary or secondary)? Other   Does the patient have Home health ordered? No   Is there a DME ordered? No   Prep survey completed? Yes          PRETTY GARCES - Registered Nurse

## 2022-12-20 NOTE — PLAN OF CARE
Goal Outcome Evaluation:  Plan of Care Reviewed With: patient        Progress: no change  Outcome Evaluation: Pt up ad david in room, feeling much better, \" pain much better\", ate breakfast without problem, no c/o nausea , keeping liquids and food down, nad, nsr on tele, pt is dicharged to home, awaiting for friend to provide transport.

## 2022-12-20 NOTE — CASE MANAGEMENT/SOCIAL WORK
Case Management Discharge Note      Final Note: Discharge to home. IKER MIRANDA CCP         Selected Continued Care - Admitted Since 12/17/2022     Destination    No services have been selected for the patient.              Durable Medical Equipment    No services have been selected for the patient.              Dialysis/Infusion    No services have been selected for the patient.              Home Medical Care    No services have been selected for the patient.              Therapy    No services have been selected for the patient.              Community Resources    No services have been selected for the patient.              Community & DME    No services have been selected for the patient.                  Transportation Services  Private: Car    Final Discharge Disposition Code: 01 - home or self-care

## 2022-12-20 NOTE — DISCHARGE SUMMARY
Patient Name: Talisha Zepeda  : 1980  MRN: [id number]    Date of Admission: 2022  Date of Discharge:  2022  Primary Care Physician: John Taylor MD      Chief Complaint:   Abdominal Pain      Discharge Diagnoses     Active Hospital Problems    Diagnosis  POA   • **Generalized abdominal pain [R10.84]  Yes   • Hypertension [I10]  Yes   • hx Peripartum cardiomyopathy [O90.3]  Yes      Resolved Hospital Problems    Diagnosis Date Resolved POA   • Leukocytosis [D72.829] 2022 Yes        Brief Admitting HPI     Ms. Zepeda is a 42 y.o.  Female with a history of peripartum cardiomyopathy, hypertension, generalized anxiety disorder, ADHD, partial hysterectomy that presents to [facility] complaining of abdominal pain and distention for 3 days. Having mild intermittent nausea, no vomiting accompanied by intermittent chills.  She recently recovered from the flu and had a lingering cough for several days.  Initially thought abdominal pain was related to coughing, however the abdominal pain worsened and continued after the cough.  It is generalized over her entire abdomen, waxing and waning and moderate to severe intensity. Describes it as sharp intense cramping.  She denies any constipation or diarrhea.  No changes in diet recently.  No medication changes.  She has not experienced any similar episodes in the past.     She has a history of hypertension which was thought to be related to taking Adderall.  She was changed to Vyvanse by her primary care provider and having blood pressure frequently monitored with plans to stop antihypertensive meds if she continues to have acceptable BP readings.  Patient also reporting history of peripartum cardiomyopathy, resolved several months after her pregnancy.       Hospital Course     Pt admitted for generalized abdominal pain in setting of recent influenza.  CT on admission showed jejunal mesenteric inflammation with concerns for possible  ischemic bowel, however CTA follow-up did not show any evidence of this.  She received supportive treatment and pain control with improvement in her symptoms.  Repeat CT scan showed improving jejunal inflammation.  She will follow-up with GI as an outpatient.  Tolerating p.o. diet with improved abdominal pain, she is stable for discharge at this time.    Discharge Plan         Day of Discharge     Subjective:  Sitting up in bed eating breakfast this morning.  Abdominal pain is improved.    Physical Exam:  Temp:  [97.5 °F (36.4 °C)-98.4 °F (36.9 °C)] 97.9 °F (36.6 °C)  Heart Rate:  [65-76] 65  Resp:  [18] 18  BP: (103-146)/(69-88) 116/74  Body mass index is 23.69 kg/m².  Physical Exam  Constitutional:       General: She is not in acute distress.     Appearance: Normal appearance.   Cardiovascular:      Rate and Rhythm: Normal rate and regular rhythm.      Pulses: Normal pulses.      Heart sounds: No murmur heard.    No gallop.   Pulmonary:      Effort: Pulmonary effort is normal. No respiratory distress.      Breath sounds: Normal breath sounds. No wheezing.   Abdominal:      General: Abdomen is flat.      Palpations: Abdomen is soft.      Tenderness: There is abdominal tenderness. There is no guarding.      Comments: TTP worse in RLQ, much improved from prior exam   Musculoskeletal:         General: No swelling. Normal range of motion.   Skin:     General: Skin is warm.      Coloration: Skin is not jaundiced.      Findings: No bruising.   Neurological:      General: No focal deficit present.      Mental Status: She is alert and oriented to person, place, and time.         Consultants     Consult Orders (all) (From admission, onward)     Start     Ordered    12/17/22 8977  Inpatient Gastroenterology Consult  Once        Specialty:  Gastroenterology  Provider:  Lola Crandall MD    12/17/22 045    12/17/22 6427  [facility] (on-call MD unless specified) Details  Once        Specialty:  Hospitalist  Provider:  (Not yet  assigned)    12/17/22 0444              Procedures     * Surgery not found *      Imaging Results (All)     Procedure Component Value Units Date/Time    CT Angiogram Abdomen Pelvis [[id number]] Collected: 12/18/22 1225     Updated: 12/18/22 1235    Narrative:      CT ANGIOGRAM ABDOMEN PELVIS-     INDICATIONS: Regional enteritis versus small bowel ischemia.  Radiation  dose reduction techniques were utilized, including automated exposure  control and exposure modulation based on body size.     TECHNIQUE: CT angiography of the abdomen or pelvis. Three-dimensional  reconstructions. NASCET criteria.     COMPARISON: 12/17/2022     FINDINGS:     Vascular:     The abdominal aorta shows no aneurysm, dissection, or significant  stenosis.     Patent celiac artery, superior mesenteric artery, bilateral renal  arteries, inferior mesenteric artery, iliac arteries, partly included  femoral arteries.           Abdomen pelvis:     Otherwise unremarkable appearance of the liver, spleen, adrenal glands,  pancreas, kidneys, bladder.     No bowel obstruction. Previous jejunal wall thickening has markedly  diminished. No free intraperitoneal gas. Pelvic free fluid is again  demonstrated.     Scattered small mesenteric and para-aortic lymph nodes are seen that are  not significant by size criteria.     The lung bases show increased consolidations in the lingula and left  lower lobe, compatible with pneumonia. Minimal left pleural effusion is  developed. Minimal likely atelectasis in the right lower lung, trace  right pleural effusion.     No acute fracture is identified.             Impression:         1. No significant focal arterial stenosis was identified.  2. Pneumonia in the left lower lobe and lingula, with minimal pleural  effusions, follow-up recommended.  3. Interval improvement of previous jejunal wall thickening     This report was finalized on 12/18/2022 12:32 PM by Dr. Gera Davalos M.D.       CT Abdomen Pelvis With  Contrast [[id number]] Collected: 12/17/22 0301     Updated: 12/17/22 0301    Narrative:      CR  Patient: KLARISSA CHAN  Time Out: 03:00  Exam(s): CT ABDOMEN + PELVIS With Contrast     EXAM:    CT Abdomen and Pelvis With Intravenous Contrast    CLINICAL HISTORY:     Reason for exam: Abdominal pain, acute, nonlocalized.    TECHNIQUE:    Axial computed tomography images of the abdomen and pelvis with   intravenous contrast.  CTDI is 8.1 mGy and DLP is 368.1 mGy-cm.  This CT   exam was performed according to the principle of ALARA (As Low As   Reasonably Achievable) by using one or more of the following dose   reduction techniques: automated exposure control, adjustment of the mA   and or kV according to patient size, and or use of iterative   reconstruction technique.    COMPARISON:    No relevant prior studies available.    FINDINGS:    Lung bases:  Unremarkable.  No mass.  No consolidation.     ABDOMEN:    Liver:  Unremarkable.  No mass.    Gallbladder and bile ducts:  Unremarkable.  No calcified stones.  No   ductal dilation.    Pancreas:  Unremarkable.  No mass.  No ductal dilation.    Spleen:  Unremarkable.  No splenomegaly.    Adrenals:  Unremarkable.  No mass.    Kidneys and ureters:  Unremarkable.  No solid mass.  No hydronephrosis.    Stomach and bowel:  Loop of jejunum in the left lower quadrant abdomen   demonstrates abnormal diffuse circumferential wall thickening and   mesenteric edema.  No findings of bowel obstruction.     PELVIS:    Appendix:  The appendix is normal.    Bladder:  Unremarkable.  No mass.    Reproductive:  Unremarkable as visualized.     ABDOMEN and PELVIS:    Intraperitoneal space:  Small volume ascites.  No free air.    Bones joints:  No acute fracture.  No dislocation.    Soft tissues:  Unremarkable.    Vasculature:  Unremarkable.  No abdominal aortic aneurysm.    Lymph nodes:  Unremarkable.  No enlarged lymph nodes.    IMPRESSION:         Wall thickening in the jejunum with  mesenteric edema are findings   suggestive of regional enteritis with a differential diagnosis of small   bowel ischemia.  Correlate clinically.      Impression:            Communications:    12 17 22 02:59 Call Doctor Regarding Above results, called  Dr. Sanabria on   12 17 02:59 (-05:00)    Electronically signed by Morgan Guzman MD on 12-17-22 at 0300          Results for orders placed during the hospital encounter of 06/24/22    Adult Stress Echo W/ Cont or Stress Agent if Necessary Per Protocol    Interpretation Summary  · Estimated left ventricular EF = 53% Left ventricular systolic function is normal.  · Left ventricular diastolic function was normal.  · This was a suboptimal stress test. No evidence of ischemia at the heart rate achieved which was 78% of age-predicted maximum heart rate.    Pertinent Labs     Results from last 7 days   Lab Units 12/19/22  0721 12/18/22 0921 12/17/22 0828 12/16/22 2020   WBC 10*3/mm3 5.79 7.23 10.21 14.92*   HEMOGLOBIN g/dL 10.3* 10.4* 11.1* 12.4   PLATELETS 10*3/mm3 323 321 329 392     Results from last 7 days   Lab Units 12/19/22  0721 12/18/22  0921 12/17/22 0828 12/16/22 2020   SODIUM mmol/L 137 139 140 136   POTASSIUM mmol/L 3.8 3.8 3.7 3.9   CHLORIDE mmol/L 105 107 106 104   CO2 mmol/L 25.9 28.1 29.1* 27.0   BUN mg/dL 4* 4* 4* 5*   CREATININE mg/dL 0.45* 0.49* 0.51* 0.55*   GLUCOSE mg/dL 72 89 81 108*   EGFR mL/min/1.73 124.1 121.6 120.4 118.3     Results from last 7 days   Lab Units 12/16/22 2020   ALBUMIN g/dL 4.00   BILIRUBIN mg/dL 0.2   ALK PHOS U/L 214*   AST (SGOT) U/L 40*   ALT (SGPT) U/L 36*     Results from last 7 days   Lab Units 12/19/22  0721 12/18/22  0921 12/17/22 0828 12/16/22 2020   CALCIUM mg/dL 8.3* 8.6 8.3* 8.6   ALBUMIN g/dL  --   --   --  4.00     Results from last 7 days   Lab Units 12/16/22 2020   LIPASE U/L 24             Invalid input(s): LDLCALC          Test Results Pending at Discharge       Discharge Details        Discharge  Medications      New Medications      Instructions Start Date   cyclobenzaprine 10 MG tablet  Commonly known as: FLEXERIL   10 mg, Oral, 3 Times Daily PRN      HYDROcodone-acetaminophen 5-325 MG per tablet  Commonly known as: NORCO   1 tablet, Oral, Every 6 Hours PRN         Continue These Medications      Instructions Start Date   ibuprofen 600 MG tablet  Commonly known as: ADVIL,MOTRIN   600 mg, Oral, Every 6 Hours PRN      lisdexamfetamine 30 MG capsule  Commonly known as: Vyvanse   30 mg, Oral, Every Morning, ADHD      lisinopril 20 MG tablet  Commonly known as: PRINIVIL,ZESTRIL   20 mg, Oral, Daily      methocarbamol 750 MG tablet  Commonly known as: ROBAXIN   750 mg, Oral, 3 Times Daily PRN      norgestimate-ethinyl estradiol 0.25-35 MG-MCG per tablet  Commonly known as: Sprintec 28   1 tablet, Oral, Daily, Skip blanks, and take active pills continuously for ovulation suppression      nystatin 100,000 unit/mL suspension  Commonly known as: MYCOSTATIN   500,000 Units, Swish & Spit, 4 Times Daily, For next week, until thrush improves.      omeprazole 40 MG capsule  Commonly known as: priLOSEC   40 mg, Oral, Daily      sertraline 50 MG tablet  Commonly known as: ZOLOFT   50 mg, Oral, Daily             No Known Allergies    Discharge Disposition:  Home or Self Care      Discharge Diet:  Diet Order   Procedures   • Diet: Regular/House Diet, Gastrointestinal Diets; Low Irritant; Texture: Regular Texture (IDDSI 7); Fluid Consistency: Thin (IDDSI 0)       Discharge Activity:   Activity Instructions     Activity as Tolerated            CODE STATUS:    Code Status and Medical Interventions:   Ordered at: 12/17/22 0456     Code Status (Patient has no pulse and is not breathing):    CPR (Attempt to Resuscitate)     Medical Interventions (Patient has pulse or is breathing):    Full Support       Future Appointments   Date Time Provider Department Center   2/1/2023  9:30 AM John Taylor MD [facility]   4/24/2023  10:45 AM Elvis Andre MD [facility]      Follow-up Information     John Taylor MD .    Specialty: Internal Medicine  Contact information:  [address] [place] [id number] [phone]             Trevin Ríos MD Follow up in 4 week(s).    Specialty: Gastroenterology  Contact information:  [address]  [facility] [address]  [place] [id number] [phone]                         Time Spent on Discharge:  Greater than 30 minutes      Osbaldo Browning MD  [facility]  12/20/22  08:44 EST

## 2022-12-20 NOTE — PAYOR COMM NOTE
Klarissa Chan (42 y.o. Female)     ATTN: DISCHARGE SUMMARY FOR REVIEW: REF# [id number]     DEPT: [phone],  [phone]    [facility]: NPI [id number]      Date of Birth   1980    Social Security Number       Address   [address] [place] [address] [place] [address]    Home Phone   [phone]    MRN   [id number]       Mosque   None    Marital Status   Single                            Admission Date   22    Admission Type   Emergency    Admitting Provider   Graeme Howard MD    Attending Provider       Department, Room/Bed   [facility], E656/1       Discharge Date   2022    Discharge Disposition   Home or Self Care    Discharge Destination                               Attending Provider: (none)   Allergies: No Known Allergies    Isolation: None   Infection: None   Code Status: CPR    Ht: 157.5 cm (62\")   Wt: 58.7 kg (129 lb 8 oz)    Admission Cmt: None   Principal Problem: Generalized abdominal pain [R10.84]                 Active Insurance as of 2022     Primary Coverage     Payor Plan Insurance Group Employer/Plan Group    [place] [id number]     Payor Plan Address Payor Plan Phone Number Payor Plan Fax Number Effective Dates    [employer] [address] [address] [phone]  2020 - None Entered    Formerly [facility] [id number]       Subscriber Name Subscriber Birth Date Member ID       KLARISSA CHAN 1980 [id number]                 Emergency Contacts      (Rel.) Home Phone Work Phone Mobile Phone    MONTEZ OMALLEY (EX SPOUSE) (Friend) [phone] -- [phone]    Eric Avelar (Roommate) -- -- [phone]               Discharge Summary      Osbaldo Browning MD at 22 0844              Patient Name: Klarissa Chan  : 1980  MRN: [id number]    Date of Admission: 2022  Date of Discharge:  2022  Primary Care Physician: John Taylor MD      Chief Complaint:    Abdominal Pain      Discharge Diagnoses     Active Hospital Problems    Diagnosis  POA   • **Generalized abdominal pain [R10.84]  Yes   • Hypertension [I10]  Yes   • hx Peripartum cardiomyopathy [O90.3]  Yes      Resolved Hospital Problems    Diagnosis Date Resolved POA   • Leukocytosis [D72.829] 12/20/2022 Yes        Brief Admitting HPI     Ms. Zepeda is a 42 y.o.  Female with a history of peripartum cardiomyopathy, hypertension, generalized anxiety disorder, ADHD, partial hysterectomy that presents to [facility] complaining of abdominal pain and distention for 3 days. Having mild intermittent nausea, no vomiting accompanied by intermittent chills.  She recently recovered from the flu and had a lingering cough for several days.  Initially thought abdominal pain was related to coughing, however the abdominal pain worsened and continued after the cough.  It is generalized over her entire abdomen, waxing and waning and moderate to severe intensity. Describes it as sharp intense cramping.  She denies any constipation or diarrhea.  No changes in diet recently.  No medication changes.  She has not experienced any similar episodes in the past.     She has a history of hypertension which was thought to be related to taking Adderall.  She was changed to Vyvanse by her primary care provider and having blood pressure frequently monitored with plans to stop antihypertensive meds if she continues to have acceptable BP readings.  Patient also reporting history of peripartum cardiomyopathy, resolved several months after her pregnancy.       Hospital Course     Pt admitted for generalized abdominal pain in setting of recent influenza.  CT on admission showed jejunal mesenteric inflammation with concerns for possible ischemic bowel, however CTA follow-up did not show any evidence of this.  She received supportive treatment and pain control with improvement in her symptoms.  Repeat CT scan showed improving jejunal  inflammation.  She will follow-up with GI as an outpatient.  Tolerating p.o. diet with improved abdominal pain, she is stable for discharge at this time.    Discharge Plan         Day of Discharge     Subjective:  Sitting up in bed eating breakfast this morning.  Abdominal pain is improved.    Physical Exam:  Temp:  [97.5 °F (36.4 °C)-98.4 °F (36.9 °C)] 97.9 °F (36.6 °C)  Heart Rate:  [65-76] 65  Resp:  [18] 18  BP: (103-146)/(69-88) 116/74  Body mass index is 23.69 kg/m².  Physical Exam  Constitutional:       General: She is not in acute distress.     Appearance: Normal appearance.   Cardiovascular:      Rate and Rhythm: Normal rate and regular rhythm.      Pulses: Normal pulses.      Heart sounds: No murmur heard.    No gallop.   Pulmonary:      Effort: Pulmonary effort is normal. No respiratory distress.      Breath sounds: Normal breath sounds. No wheezing.   Abdominal:      General: Abdomen is flat.      Palpations: Abdomen is soft.      Tenderness: There is abdominal tenderness. There is no guarding.      Comments: TTP worse in RLQ, much improved from prior exam   Musculoskeletal:         General: No swelling. Normal range of motion.   Skin:     General: Skin is warm.      Coloration: Skin is not jaundiced.      Findings: No bruising.   Neurological:      General: No focal deficit present.      Mental Status: She is alert and oriented to person, place, and time.         Consultants     Consult Orders (all) (From admission, onward)     Start     Ordered    12/17/22 0457  Inpatient Gastroenterology Consult  Once        Specialty:  Gastroenterology  Provider:  Lola Crandall MD    12/17/22 045    12/17/22 4376  [facility] (on-call MD unless specified) Details  Once        Specialty:  Hospitalist  Provider:  (Not yet assigned)    12/17/22 3313              Procedures     * Surgery not found *      Imaging Results (All)     Procedure Component Value Units Date/Time    CT Angiogram Abdomen Pelvis [[id number]]  Collected: 12/18/22 1225     Updated: 12/18/22 1235    Narrative:      CT ANGIOGRAM ABDOMEN PELVIS-     INDICATIONS: Regional enteritis versus small bowel ischemia.  Radiation  dose reduction techniques were utilized, including automated exposure  control and exposure modulation based on body size.     TECHNIQUE: CT angiography of the abdomen or pelvis. Three-dimensional  reconstructions. NASCET criteria.     COMPARISON: 12/17/2022     FINDINGS:     Vascular:     The abdominal aorta shows no aneurysm, dissection, or significant  stenosis.     Patent celiac artery, superior mesenteric artery, bilateral renal  arteries, inferior mesenteric artery, iliac arteries, partly included  femoral arteries.           Abdomen pelvis:     Otherwise unremarkable appearance of the liver, spleen, adrenal glands,  pancreas, kidneys, bladder.     No bowel obstruction. Previous jejunal wall thickening has markedly  diminished. No free intraperitoneal gas. Pelvic free fluid is again  demonstrated.     Scattered small mesenteric and para-aortic lymph nodes are seen that are  not significant by size criteria.     The lung bases show increased consolidations in the lingula and left  lower lobe, compatible with pneumonia. Minimal left pleural effusion is  developed. Minimal likely atelectasis in the right lower lung, trace  right pleural effusion.     No acute fracture is identified.             Impression:         1. No significant focal arterial stenosis was identified.  2. Pneumonia in the left lower lobe and lingula, with minimal pleural  effusions, follow-up recommended.  3. Interval improvement of previous jejunal wall thickening     This report was finalized on 12/18/2022 12:32 PM by Dr. Gera Davalos M.D.       CT Abdomen Pelvis With Contrast [[id number]] Collected: 12/17/22 0301     Updated: 12/17/22 0301    Narrative:      CR  Patient: KLARISSA CHAN  Time Out: 03:00  Exam(s): CT ABDOMEN + PELVIS With Contrast     EXAM:     CT Abdomen and Pelvis With Intravenous Contrast    CLINICAL HISTORY:     Reason for exam: Abdominal pain, acute, nonlocalized.    TECHNIQUE:    Axial computed tomography images of the abdomen and pelvis with   intravenous contrast.  CTDI is 8.1 mGy and DLP is 368.1 mGy-cm.  This CT   exam was performed according to the principle of ALARA (As Low As   Reasonably Achievable) by using one or more of the following dose   reduction techniques: automated exposure control, adjustment of the mA   and or kV according to patient size, and or use of iterative   reconstruction technique.    COMPARISON:    No relevant prior studies available.    FINDINGS:    Lung bases:  Unremarkable.  No mass.  No consolidation.     ABDOMEN:    Liver:  Unremarkable.  No mass.    Gallbladder and bile ducts:  Unremarkable.  No calcified stones.  No   ductal dilation.    Pancreas:  Unremarkable.  No mass.  No ductal dilation.    Spleen:  Unremarkable.  No splenomegaly.    Adrenals:  Unremarkable.  No mass.    Kidneys and ureters:  Unremarkable.  No solid mass.  No hydronephrosis.    Stomach and bowel:  Loop of jejunum in the left lower quadrant abdomen   demonstrates abnormal diffuse circumferential wall thickening and   mesenteric edema.  No findings of bowel obstruction.     PELVIS:    Appendix:  The appendix is normal.    Bladder:  Unremarkable.  No mass.    Reproductive:  Unremarkable as visualized.     ABDOMEN and PELVIS:    Intraperitoneal space:  Small volume ascites.  No free air.    Bones joints:  No acute fracture.  No dislocation.    Soft tissues:  Unremarkable.    Vasculature:  Unremarkable.  No abdominal aortic aneurysm.    Lymph nodes:  Unremarkable.  No enlarged lymph nodes.    IMPRESSION:         Wall thickening in the jejunum with mesenteric edema are findings   suggestive of regional enteritis with a differential diagnosis of small   bowel ischemia.  Correlate clinically.      Impression:            Communications:    12 17 22  02:59 Call Doctor Regarding Above results, called  Dr. Sanabria on   12 17 02:59 (-05:00)    Electronically signed by Morgan Guzman MD on 12-17-22 at 0300          Results for orders placed during the hospital encounter of 06/24/22    Adult Stress Echo W/ Cont or Stress Agent if Necessary Per Protocol    Interpretation Summary  · Estimated left ventricular EF = 53% Left ventricular systolic function is normal.  · Left ventricular diastolic function was normal.  · This was a suboptimal stress test. No evidence of ischemia at the heart rate achieved which was 78% of age-predicted maximum heart rate.    Pertinent Labs     Results from last 7 days   Lab Units 12/19/22 0721 12/18/22 0921 12/17/22 0828 12/16/22 2020   WBC 10*3/mm3 5.79 7.23 10.21 14.92*   HEMOGLOBIN g/dL 10.3* 10.4* 11.1* 12.4   PLATELETS 10*3/mm3 323 321 329 392     Results from last 7 days   Lab Units 12/19/22 0721 12/18/22 0921 12/17/22 0828 12/16/22 2020   SODIUM mmol/L 137 139 140 136   POTASSIUM mmol/L 3.8 3.8 3.7 3.9   CHLORIDE mmol/L 105 107 106 104   CO2 mmol/L 25.9 28.1 29.1* 27.0   BUN mg/dL 4* 4* 4* 5*   CREATININE mg/dL 0.45* 0.49* 0.51* 0.55*   GLUCOSE mg/dL 72 89 81 108*   EGFR mL/min/1.73 124.1 121.6 120.4 118.3     Results from last 7 days   Lab Units 12/16/22 2020   ALBUMIN g/dL 4.00   BILIRUBIN mg/dL 0.2   ALK PHOS U/L 214*   AST (SGOT) U/L 40*   ALT (SGPT) U/L 36*     Results from last 7 days   Lab Units 12/19/22 0721 12/18/22 0921 12/17/22 0828 12/16/22 2020   CALCIUM mg/dL 8.3* 8.6 8.3* 8.6   ALBUMIN g/dL  --   --   --  4.00     Results from last 7 days   Lab Units 12/16/22 2020   LIPASE U/L 24             Invalid input(s): LDLCALC          Test Results Pending at Discharge       Discharge Details        Discharge Medications      New Medications      Instructions Start Date   cyclobenzaprine 10 MG tablet  Commonly known as: FLEXERIL   10 mg, Oral, 3 Times Daily PRN      HYDROcodone-acetaminophen 5-325 MG per  tablet  Commonly known as: NORCO   1 tablet, Oral, Every 6 Hours PRN         Continue These Medications      Instructions Start Date   ibuprofen 600 MG tablet  Commonly known as: ADVIL,MOTRIN   600 mg, Oral, Every 6 Hours PRN      lisdexamfetamine 30 MG capsule  Commonly known as: Vyvanse   30 mg, Oral, Every Morning, ADHD      lisinopril 20 MG tablet  Commonly known as: PRINIVIL,ZESTRIL   20 mg, Oral, Daily      methocarbamol 750 MG tablet  Commonly known as: ROBAXIN   750 mg, Oral, 3 Times Daily PRN      norgestimate-ethinyl estradiol 0.25-35 MG-MCG per tablet  Commonly known as: Sprintec 28   1 tablet, Oral, Daily, Skip blanks, and take active pills continuously for ovulation suppression      nystatin 100,000 unit/mL suspension  Commonly known as: MYCOSTATIN   500,000 Units, Swish & Spit, 4 Times Daily, For next week, until thrush improves.      omeprazole 40 MG capsule  Commonly known as: priLOSEC   40 mg, Oral, Daily      sertraline 50 MG tablet  Commonly known as: ZOLOFT   50 mg, Oral, Daily             No Known Allergies    Discharge Disposition:  Home or Self Care      Discharge Diet:  Diet Order   Procedures   • Diet: Regular/House Diet, Gastrointestinal Diets; Low Irritant; Texture: Regular Texture (IDDSI 7); Fluid Consistency: Thin (IDDSI 0)       Discharge Activity:   Activity Instructions     Activity as Tolerated            CODE STATUS:    Code Status and Medical Interventions:   Ordered at: 12/17/22 0456     Code Status (Patient has no pulse and is not breathing):    CPR (Attempt to Resuscitate)     Medical Interventions (Patient has pulse or is breathing):    Full Support       Future Appointments   Date Time Provider Department Center   2/1/2023  9:30 AM John Taylor MD [facility]   4/24/2023 10:45 AM Elvis Andre MD [facility]      Follow-up Information     John Taylor MD .    Specialty: Internal Medicine  Contact information:  [address] [place] [address]  [place] [address]  [phone]             Trevin Ríos MD Follow up in 4 week(s).    Specialty: Gastroenterology  Contact information:  [address]  [place] [id number] [phone]                         Time Spent on Discharge:  Greater than 30 minutes      Osbaldo Browning MD  [facility]  12/20/22  08:44 EST                Electronically signed by Osbaldo Browning MD at 12/20/22 7649

## 2022-12-20 NOTE — PLAN OF CARE
Goal Outcome Evaluation:  Plan of Care Reviewed With: patient         Pt is alert and oriented. Has called out in pain. Was given appropriate medication and the intervention was successful. Patient's vitals have been stable throughout the shift.   Will continue to monitor.

## 2022-12-21 ENCOUNTER — TRANSITIONAL CARE MANAGEMENT TELEPHONE ENCOUNTER (OUTPATIENT)
Dept: CALL CENTER | Facility: HOSPITAL | Age: 42
End: 2022-12-21

## 2022-12-21 NOTE — OUTREACH NOTE
Call Center TCM Note    Flowsheet Row Responses   [facility] patient discharged from? [place]   Does the patient have one of the following disease processes/diagnoses(primary or secondary)? Other   TCM attempt successful? No   Unsuccessful attempts Attempt 2          Jasmine Kurtz RN    12/21/2022, 12:20 EST

## 2022-12-21 NOTE — OUTREACH NOTE
Call Center TCM Note    Flowsheet Row Responses   [facility] patient discharged from? [place]   Does the patient have one of the following disease processes/diagnoses(primary or secondary)? Other   TCM attempt successful? No  [Out of date PCP verbal release]   Unsuccessful attempts Attempt 1          Jasmine Kurtz RN    12/21/2022, 10:53 EST

## 2022-12-22 ENCOUNTER — TRANSITIONAL CARE MANAGEMENT TELEPHONE ENCOUNTER (OUTPATIENT)
Dept: CALL CENTER | Facility: HOSPITAL | Age: 42
End: 2022-12-22

## 2022-12-22 NOTE — OUTREACH NOTE
Call Center TCM Note    Flowsheet Row Responses   [facility] patient discharged from? [place]   Does the patient have one of the following disease processes/diagnoses(primary or secondary)? Other   TCM attempt successful? No   Unsuccessful attempts Attempt 3          Aundrea Mora RN    12/22/2022, 13:56 EST

## 2022-12-22 NOTE — PROGRESS NOTES
Enter Query Response Below      Query Response:       Viral enteritis       If applicable, please update the problem list.     Patient: Talisha Zepeda        : 1980  Account: [id number]           Admit Date:         How to Respond to this query:       a. Click New Note     b. Answer query within the yellow box.                c. Update the Problem List, if applicable.      If you have any questions about this query contact me at: [email or web address]      Dr. Browning:     Patient admitted with complaint of generalized abdominal pain. CT scan of abdomen and pelvis results on  were as follows: wall thickening in the jejunum with mesenteric edema are findings suggestive of regional enteritis with a differential diagnosis of small bowel ischemia. CT angiogram of abdomen and pelvis on  revealed interval improvement of previous jejunal wall thickening. Patient was treated with IV Dilaudid, IV fluids, and supportive care. Physician consultation note on  included the following documentation: \"Suspect the enteritis is a residual from flu...\" Enteritis was not listed as a discharge diagnosis.     After study, can you further clarify the enteritis as:    Viral enteritis  Enteritis, not otherwise specified  Enteritis ruled out   Other (please specify) ________________________  Clinically indeterminable     By submitting this query, we are merely seeking further clarification of documentation to accurately reflect all conditions that you are monitoring, evaluating, treating or that extend the hospitalization or utilize additional resources of care. Please utilize your independent clinical judgment when addressing the question(s) above.     This query and your response, once completed, will be entered into the legal medical record.    Sincerely,  Elias ZAMORA, RN, CCDS   Clinical Documentation Integrity Program

## 2022-12-28 ENCOUNTER — TELEPHONE (OUTPATIENT)
Dept: GASTROENTEROLOGY | Facility: CLINIC | Age: 42
End: 2022-12-28
Payer: MEDICAID

## 2022-12-28 ENCOUNTER — OFFICE VISIT (OUTPATIENT)
Dept: FAMILY MEDICINE CLINIC | Facility: CLINIC | Age: 42
End: 2022-12-28

## 2022-12-28 VITALS
TEMPERATURE: 98.2 F | HEIGHT: 62 IN | SYSTOLIC BLOOD PRESSURE: 122 MMHG | BODY MASS INDEX: 23.41 KG/M2 | OXYGEN SATURATION: 98 % | DIASTOLIC BLOOD PRESSURE: 68 MMHG | HEART RATE: 98 BPM | WEIGHT: 127.2 LBS

## 2022-12-28 DIAGNOSIS — R10.30 LOWER ABDOMINAL PAIN: ICD-10-CM

## 2022-12-28 DIAGNOSIS — K52.9 COLITIS: Primary | ICD-10-CM

## 2022-12-28 DIAGNOSIS — J18.9 PNEUMONIA DUE TO INFECTIOUS ORGANISM, UNSPECIFIED LATERALITY, UNSPECIFIED PART OF LUNG: ICD-10-CM

## 2022-12-28 PROBLEM — B37.0 THRUSH: Status: ACTIVE | Noted: 2022-12-28

## 2022-12-28 PROCEDURE — 99214 OFFICE O/P EST MOD 30 MIN: CPT | Performed by: INTERNAL MEDICINE

## 2022-12-28 RX ORDER — AMANTADINE HYDROCHLORIDE 100 MG/1
TABLET ORAL
COMMUNITY
Start: 2022-10-13 | End: 2023-01-24

## 2022-12-28 RX ORDER — CIPROFLOXACIN 500 MG/1
500 TABLET, FILM COATED ORAL 2 TIMES DAILY
Qty: 28 TABLET | Refills: 0 | Status: SHIPPED | OUTPATIENT
Start: 2022-12-28 | End: 2023-01-24

## 2022-12-28 NOTE — PROGRESS NOTES
"Chief Complaint  f/u hosp  inf. small intestine (viral)    Subjective        Talisha Zepeda presents to [facility]  History of Present Illness Current outpatient and discharge medications have been reconciled for the patient.  Reviewed by: John Taylor MD  Patient is in hospital follow-up for colitis.  Patient was admitted to [facility] for colitis.  Patient's endoscopy showed a duodenal colitis.  Patient is following up and has severe lower abdominal pain and some diarrhea.  Patient is getting a stool work-up and labs drawn.  Patient's chest x-ray also showed pneumonia.  Patient states she was not informed of this and did not get treated.  Patient is being put on Cipro 500 mg twice daily.  Patient is getting another chest x-ray done tomorrow.  Patient's oxygen saturation was 98%.  Patient did not have a fever.  Patient will follow-up next week.    Dictated utilizing Dragon dictation. If there are questions or for further clarification, please contact me.    Objective   Vital Signs:  Blood Pressure 122/68   Pulse 98   Temperature 98.2 °F (36.8 °C)   Height 157.5 cm (62\")   Weight 57.7 kg (127 lb 3.2 oz)   Oxygen Saturation 98%   Body Mass Index 23.27 kg/m²   Estimated body mass index is 23.27 kg/m² as calculated from the following:    Height as of this encounter: 157.5 cm (62\").    Weight as of this encounter: 57.7 kg (127 lb 3.2 oz).    BMI is within normal parameters. No other follow-up for BMI required.      Physical Exam  Vitals and nursing note reviewed.   Constitutional:       Appearance: Normal appearance. She is well-developed.   HENT:      Head: Normocephalic and atraumatic.      Nose: Nose normal.      Mouth/Throat:      Mouth: Mucous membranes are moist.      Pharynx: Oropharynx is clear.   Eyes:      Extraocular Movements: Extraocular movements intact.      Conjunctiva/sclera: Conjunctivae normal.      Pupils: Pupils are equal, round, and reactive to light. "   Cardiovascular:      Rate and Rhythm: Normal rate and regular rhythm.      Heart sounds: Normal heart sounds. No murmur heard.    No friction rub. No gallop.   Pulmonary:      Effort: Pulmonary effort is normal. No respiratory distress.      Breath sounds: Normal breath sounds. No stridor. No wheezing, rhonchi or rales.   Chest:      Chest wall: No tenderness.   Abdominal:      General: Bowel sounds are normal. There is no distension.      Palpations: Abdomen is soft. There is no mass.      Tenderness: There is abdominal tenderness. There is rebound. There is no right CVA tenderness, left CVA tenderness or guarding.      Hernia: No hernia is present.   Musculoskeletal:         General: Normal range of motion.      Cervical back: Normal range of motion and neck supple.   Skin:     General: Skin is warm and dry.   Neurological:      General: No focal deficit present.      Mental Status: She is alert and oriented to person, place, and time. Mental status is at baseline.   Psychiatric:         Mood and Affect: Mood normal.         Behavior: Behavior normal.         Thought Content: Thought content normal.         Judgment: Judgment normal.        Result Review :                Assessment and Plan  #1 stool work-up, CMP, CBC differential #2 Cipro 500 mg twice daily #3 no lifting #4 chest x-ray PA and lateral #5 needs to be rescoped  Diagnoses and all orders for this visit:    1. Colitis (Primary)  -     CT Abdomen Pelvis With & Without Contrast; Future  -     Clostridioides difficile Toxin, PCR - Stool, Per Rectum; Future  -     Ova & Parasite Examination - Stool, Per Rectum; Future  -     Stool Culture (Reference Lab) - Stool, Per Rectum; Future  -     Fecal Leukocytes - Stool, Per Rectum; Future  -     CBC & Differential  -     Comprehensive Metabolic Panel    2. Pneumonia due to infectious organism, unspecified laterality, unspecified part of lung  -     XR Chest 2 View  -     CT Abdomen Pelvis With & Without  Contrast; Future  -     Clostridioides difficile Toxin, PCR - Stool, Per Rectum; Future  -     Ova & Parasite Examination - Stool, Per Rectum; Future  -     Stool Culture (Reference Lab) - Stool, Per Rectum; Future  -     Fecal Leukocytes - Stool, Per Rectum; Future  -     CBC & Differential  -     Comprehensive Metabolic Panel    3. Lower abdominal pain  -     CT Abdomen Pelvis With & Without Contrast; Future  -     Clostridioides difficile Toxin, PCR - Stool, Per Rectum; Future  -     Ova & Parasite Examination - Stool, Per Rectum; Future  -     Stool Culture (Reference Lab) - Stool, Per Rectum; Future  -     Fecal Leukocytes - Stool, Per Rectum; Future  -     CBC & Differential  -     Comprehensive Metabolic Panel    Other orders  -     ciprofloxacin (Cipro) 500 MG tablet; Take 1 tablet by mouth 2 (Two) Times a Day.  Dispense: 28 tablet; Refill: 0             Follow Up   Return in about 1 week (around 1/4/2023), or if symptoms worsen or fail to improve, for Recheck.  Patient was given instructions and counseling regarding her condition or for health maintenance advice. Please see specific information pulled into the AVS if appropriate.

## 2022-12-28 NOTE — TELEPHONE ENCOUNTER
Caller: Talisha Zepeda    Relationship to patient: Self    Best call back number: [phone]    PATIENT NEEDS A 4 WK HOSPITAL FOLLOW UP APPT FROM HER HOSPITAL STAY (DEC 17-21ST).     Type of visit: HOSPITAL FOLLOW UP    Requested date: AROUND 1/17/23    [facility] WAS UNABLE TO SCHEDULE AN APPT WITHIN THE NECESSARY TIME FRAME.     PLEASE CALL THE PATIENT TO MAKE THIS APPT.

## 2022-12-30 LAB
ALBUMIN SERPL-MCNC: 4.3 G/DL (ref 3.8–4.8)
ALBUMIN/GLOB SERPL: 2.2 {RATIO} (ref 1.2–2.2)
ALP SERPL-CCNC: 162 IU/L (ref 44–121)
ALT SERPL-CCNC: 23 IU/L (ref 0–32)
AST SERPL-CCNC: 26 IU/L (ref 0–40)
BASOPHILS # BLD AUTO: 0 X10E3/UL (ref 0–0.2)
BASOPHILS NFR BLD AUTO: 1 %
BILIRUB SERPL-MCNC: <0.2 MG/DL (ref 0–1.2)
BUN SERPL-MCNC: 11 MG/DL (ref 6–24)
BUN/CREAT SERPL: 16 (ref 9–23)
CALCIUM SERPL-MCNC: 9 MG/DL (ref 8.7–10.2)
CHLORIDE SERPL-SCNC: 103 MMOL/L (ref 96–106)
CO2 SERPL-SCNC: 24 MMOL/L (ref 20–29)
CREAT SERPL-MCNC: 0.67 MG/DL (ref 0.57–1)
EGFRCR SERPLBLD CKD-EPI 2021: 113 ML/MIN/1.73
EOSINOPHIL # BLD AUTO: 0 X10E3/UL (ref 0–0.4)
EOSINOPHIL NFR BLD AUTO: 1 %
ERYTHROCYTE [DISTWIDTH] IN BLOOD BY AUTOMATED COUNT: 12.1 % (ref 11.7–15.4)
GLOBULIN SER CALC-MCNC: 2 G/DL (ref 1.5–4.5)
GLUCOSE SERPL-MCNC: 79 MG/DL (ref 70–99)
HCT VFR BLD AUTO: 39.4 % (ref 34–46.6)
HGB BLD-MCNC: 12.9 G/DL (ref 11.1–15.9)
IMM GRANULOCYTES # BLD AUTO: 0 X10E3/UL (ref 0–0.1)
IMM GRANULOCYTES NFR BLD AUTO: 0 %
LYMPHOCYTES # BLD AUTO: 1.4 X10E3/UL (ref 0.7–3.1)
LYMPHOCYTES NFR BLD AUTO: 26 %
MCH RBC QN AUTO: 31.2 PG (ref 26.6–33)
MCHC RBC AUTO-ENTMCNC: 32.7 G/DL (ref 31.5–35.7)
MCV RBC AUTO: 95 FL (ref 79–97)
MONOCYTES # BLD AUTO: 0.3 X10E3/UL (ref 0.1–0.9)
MONOCYTES NFR BLD AUTO: 5 %
NEUTROPHILS # BLD AUTO: 3.8 X10E3/UL (ref 1.4–7)
NEUTROPHILS NFR BLD AUTO: 67 %
PLATELET # BLD AUTO: 342 X10E3/UL (ref 150–450)
POTASSIUM SERPL-SCNC: 4.1 MMOL/L (ref 3.5–5.2)
PROT SERPL-MCNC: 6.3 G/DL (ref 6–8.5)
RBC # BLD AUTO: 4.13 X10E6/UL (ref 3.77–5.28)
SODIUM SERPL-SCNC: 140 MMOL/L (ref 134–144)
WBC # BLD AUTO: 5.6 X10E3/UL (ref 3.4–10.8)

## 2023-01-05 ENCOUNTER — TELEPHONE (OUTPATIENT)
Dept: FAMILY MEDICINE CLINIC | Facility: CLINIC | Age: 43
End: 2023-01-05
Payer: MEDICAID

## 2023-01-05 NOTE — TELEPHONE ENCOUNTER
PATIENT CALLED AND STATES SHE WAS REFERRED TO [facility] AND DR. MEDINA WILL NOT BE ABLE TO SEE HER UNTIL MARCH. SHE WAS TOLD TO CALL BACK IF SHE COULD NOT BE SEEN THIS MONTH AND SHE WOULD BE REFERRED TO ANOTHER DR.    PLEASE CALL [phone]

## 2023-01-05 NOTE — TELEPHONE ENCOUNTER
Patient informed all take awhile to get into and ct is 1/17 might want to wait and see how that goes, also told to check with ins. See if they re: one not in [facility] office since they cant see her til march but she said you Knew one to try to get her in with if so put in refferal please.

## 2023-01-17 ENCOUNTER — HOSPITAL ENCOUNTER (OUTPATIENT)
Dept: CT IMAGING | Facility: HOSPITAL | Age: 43
Discharge: HOME | End: 2023-01-17
Payer: MEDICAID

## 2023-01-17 ENCOUNTER — HOSPITAL ENCOUNTER (OUTPATIENT)
Dept: GENERAL RADIOLOGY | Facility: HOSPITAL | Age: 43
Discharge: HOME | End: 2023-01-17
Payer: MEDICAID

## 2023-01-17 DIAGNOSIS — K52.9 COLITIS: ICD-10-CM

## 2023-01-17 DIAGNOSIS — R10.30 LOWER ABDOMINAL PAIN: ICD-10-CM

## 2023-01-17 DIAGNOSIS — J18.9 PNEUMONIA DUE TO INFECTIOUS ORGANISM, UNSPECIFIED LATERALITY, UNSPECIFIED PART OF LUNG: ICD-10-CM

## 2023-01-17 PROBLEM — K55.20: Status: ACTIVE | Noted: 2023-01-17

## 2023-01-17 PROCEDURE — 25010000002 IOPAMIDOL 61 % SOLUTION: Performed by: INTERNAL MEDICINE

## 2023-01-17 PROCEDURE — 71046 X-RAY EXAM CHEST 2 VIEWS: CPT

## 2023-01-17 PROCEDURE — 74177 CT ABD & PELVIS W/CONTRAST: CPT

## 2023-01-17 RX ADMIN — IOPAMIDOL 90 ML: 612 INJECTION, SOLUTION INTRAVENOUS at 09:47

## 2023-01-23 DIAGNOSIS — F90.0 ATTENTION DEFICIT HYPERACTIVITY DISORDER (ADHD), PREDOMINANTLY INATTENTIVE TYPE: ICD-10-CM

## 2023-01-23 NOTE — TELEPHONE ENCOUNTER
Caller: Talisha Zepeda    Relationship: Self    Best call back number: [phone]     Requested Prescriptions:   Requested Prescriptions     Pending Prescriptions Disp Refills   • lisdexamfetamine (Vyvanse) 30 MG capsule 30 capsule 0     Sig: Take 1 capsule by mouth Every Morning ADHD        Pharmacy where request should be sent: [id number] [place], [place] - [address] [facility] [address] [facility] [phone] [facility] [phone] FX     Does the patient have less than a 3 day supply:  [x] Yes  [] No    Would you like a call back once the refill request has been completed: [] Yes [x] No    If the office needs to give you a call back, can they leave a voicemail: [] Yes [x] No    Michael Coronel Rep   01/23/23 12:08 EST

## 2023-01-24 ENCOUNTER — OFFICE VISIT (OUTPATIENT)
Dept: FAMILY MEDICINE CLINIC | Facility: CLINIC | Age: 43
End: 2023-01-24
Payer: MEDICAID

## 2023-01-24 VITALS
HEART RATE: 85 BPM | BODY MASS INDEX: 23.92 KG/M2 | SYSTOLIC BLOOD PRESSURE: 120 MMHG | HEIGHT: 62 IN | TEMPERATURE: 98.2 F | DIASTOLIC BLOOD PRESSURE: 72 MMHG | WEIGHT: 130 LBS | OXYGEN SATURATION: 97 %

## 2023-01-24 DIAGNOSIS — F90.0 ATTENTION DEFICIT HYPERACTIVITY DISORDER (ADHD), PREDOMINANTLY INATTENTIVE TYPE: ICD-10-CM

## 2023-01-24 DIAGNOSIS — E55.9 VITAMIN D DEFICIENCY: ICD-10-CM

## 2023-01-24 DIAGNOSIS — K52.9 COLITIS: Primary | ICD-10-CM

## 2023-01-24 DIAGNOSIS — I10 HYPERTENSION, UNSPECIFIED TYPE: ICD-10-CM

## 2023-01-24 PROBLEM — M54.50 LOW BACK PAIN: Status: ACTIVE | Noted: 2023-01-24

## 2023-01-24 PROCEDURE — 99214 OFFICE O/P EST MOD 30 MIN: CPT | Performed by: INTERNAL MEDICINE

## 2023-01-24 NOTE — PROGRESS NOTES
"Chief Complaint  test results ct (gi)    Subjective        Talisha Zepeda presents to [facility]  History of Present Illness patient was seen for a physical.  Patient's diet and physical activity discussed at this visit.  Patient was seen for colitis.  Patient did have a CT scan of the abdomen which showed possible colitis.  Patient being referred to gastroenterology for possible colonoscopy.  Patient blood pressures been running 120s over 70s.  Patient will continue lisinopril 20 mg daily.  Patient does have ADHD.  Patient is on Vyvanse 30 mg daily.  Patient is stable on this dose and will continue present treatment.  Patient does have vitamin D3 deficiency is supplemented with over-the-counter D3.    Dictated utilizing Dragon dictation. If there are questions or for further clarification, please contact me.    Objective   Vital Signs:  Blood Pressure 120/72   Pulse 85   Temperature 98.2 °F (36.8 °C)   Height 157.5 cm (62\")   Weight 59 kg (130 lb)   Oxygen Saturation 97%   Body Mass Index 23.78 kg/m²   Estimated body mass index is 23.78 kg/m² as calculated from the following:    Height as of this encounter: 157.5 cm (62\").    Weight as of this encounter: 59 kg (130 lb).       BMI is within normal parameters. No other follow-up for BMI required.      Physical Exam  Vitals and nursing note reviewed.   Constitutional:       General: She is not in acute distress.     Appearance: Normal appearance. She is well-developed. She is not ill-appearing, toxic-appearing or diaphoretic.   HENT:      Head: Normocephalic and atraumatic.      Right Ear: Tympanic membrane, ear canal and external ear normal. There is no impacted cerumen.      Left Ear: Tympanic membrane, ear canal and external ear normal. There is no impacted cerumen.      Nose: Nose normal. No congestion or rhinorrhea.      Mouth/Throat:      Mouth: Mucous membranes are moist.      Pharynx: Oropharynx is clear. No " oropharyngeal exudate or posterior oropharyngeal erythema.   Eyes:      General: No scleral icterus.        Right eye: No discharge.         Left eye: No discharge.      Extraocular Movements: Extraocular movements intact.      Conjunctiva/sclera: Conjunctivae normal.      Pupils: Pupils are equal, round, and reactive to light.   Neck:      Thyroid: No thyromegaly.      Vascular: No carotid bruit or JVD.      Trachea: No tracheal deviation.   Cardiovascular:      Rate and Rhythm: Normal rate and regular rhythm.      Heart sounds: Normal heart sounds. No murmur heard.    No friction rub. No gallop.   Pulmonary:      Effort: Pulmonary effort is normal. No respiratory distress.      Breath sounds: Normal breath sounds. No stridor. No wheezing, rhonchi or rales.   Chest:      Chest wall: No tenderness.   Abdominal:      General: Bowel sounds are normal. There is no distension.      Palpations: Abdomen is soft. There is no mass.      Tenderness: There is no abdominal tenderness. There is no right CVA tenderness, left CVA tenderness, guarding or rebound.      Hernia: No hernia is present.   Musculoskeletal:         General: Tenderness present. No swelling, deformity or signs of injury. Normal range of motion.      Cervical back: Normal range of motion and neck supple. No rigidity. No muscular tenderness.      Right lower leg: No edema.      Left lower leg: No edema.   Lymphadenopathy:      Cervical: No cervical adenopathy.   Skin:     General: Skin is warm and dry.      Coloration: Skin is not jaundiced or pale.      Findings: No bruising, erythema, lesion or rash.   Neurological:      General: No focal deficit present.      Mental Status: She is alert and oriented to person, place, and time. Mental status is at baseline.      Cranial Nerves: No cranial nerve deficit.      Sensory: No sensory deficit.      Motor: No weakness or abnormal muscle tone.      Coordination: Coordination normal.      Gait: Gait normal.      Deep  Tendon Reflexes: Reflexes normal.   Psychiatric:         Mood and Affect: Mood normal.         Behavior: Behavior normal.         Thought Content: Thought content normal.         Judgment: Judgment normal.        Result Review :                    Assessment and Plan  #1 physical #2 GI consult for colitis #3 no lifting over 5 pounds  Diagnoses and all orders for this visit:    1. Colitis (Primary)  -     Ambulatory Referral to Gastroenterology  -     Comprehensive Metabolic Panel; Future  -     CBC (No Diff); Future  -     Lipid Panel; Future  -     Compliance Drug Analysis, Ur - Urine, Clean Catch; Future  -     Vitamin D,25-Hydroxy; Future    2. Attention deficit hyperactivity disorder (ADHD), predominantly inattentive type  -     Comprehensive Metabolic Panel; Future  -     CBC (No Diff); Future  -     Lipid Panel; Future  -     Compliance Drug Analysis, Ur - Urine, Clean Catch; Future  -     Vitamin D,25-Hydroxy; Future    3. Hypertension, unspecified type  -     Comprehensive Metabolic Panel; Future  -     CBC (No Diff); Future  -     Lipid Panel; Future  -     Compliance Drug Analysis, Ur - Urine, Clean Catch; Future  -     Vitamin D,25-Hydroxy; Future    4. Vitamin D deficiency  -     Comprehensive Metabolic Panel; Future  -     CBC (No Diff); Future  -     Lipid Panel; Future  -     Compliance Drug Analysis, Ur - Urine, Clean Catch; Future  -     Vitamin D,25-Hydroxy; Future             Follow Up   Return in about 4 months (around 5/24/2023), or if symptoms worsen or fail to improve, for Recheck.  Patient was given instructions and counseling regarding her condition or for health maintenance advice. Please see specific information pulled into the AVS if appropriate.

## 2023-01-30 ENCOUNTER — OFFICE VISIT (OUTPATIENT)
Dept: GASTROENTEROLOGY | Facility: CLINIC | Age: 43
End: 2023-01-30
Payer: MEDICAID

## 2023-01-30 ENCOUNTER — TELEPHONE (OUTPATIENT)
Dept: GASTROENTEROLOGY | Facility: CLINIC | Age: 43
End: 2023-01-30
Payer: MEDICAID

## 2023-01-30 VITALS
SYSTOLIC BLOOD PRESSURE: 138 MMHG | DIASTOLIC BLOOD PRESSURE: 87 MMHG | BODY MASS INDEX: 23.52 KG/M2 | OXYGEN SATURATION: 97 % | TEMPERATURE: 98.2 F | HEART RATE: 78 BPM | HEIGHT: 62 IN | WEIGHT: 127.8 LBS

## 2023-01-30 DIAGNOSIS — R93.3 ABNORMAL CT SCAN, COLON: Primary | ICD-10-CM

## 2023-01-30 DIAGNOSIS — R10.30 LOWER ABDOMINAL PAIN: ICD-10-CM

## 2023-01-30 PROCEDURE — 99214 OFFICE O/P EST MOD 30 MIN: CPT | Performed by: NURSE PRACTITIONER

## 2023-01-30 RX ORDER — DICYCLOMINE HYDROCHLORIDE 10 MG/1
10 CAPSULE ORAL 3 TIMES DAILY PRN
Qty: 120 CAPSULE | Refills: 2 | Status: SHIPPED | OUTPATIENT
Start: 2023-01-30

## 2023-01-30 RX ORDER — OSELTAMIVIR PHOSPHATE 75 MG/1
1 CAPSULE ORAL EVERY 12 HOURS SCHEDULED
COMMUNITY
Start: 2022-12-06 | End: 2023-01-30

## 2023-01-30 NOTE — PROGRESS NOTES
Chief Complaint   Patient presents with   • Abdominal Pain       HPI    Talisha Zepeda is a  42 y.o. female here for a follow up visit for abdominal pain hospital follow-up.    This patient was seen by Dr. Ríos for new patient consult during hospitalization in December.     History of cardiomyopathy, hypertension, generalized anxiety disorder, ADHD, and partial hysterectomy.    Hospital discharge records reviewed.  Patient had recently recovered from the flu and abdominal pain was thought to be related to coughing.  Pain worsened and continued after the cough.  CT on admission showed jejunal mesenteric inflammation concern for possible ischemic bowel however CTA follow-up did not show any evidence of this.  She was treated with supportive care and pain control with improvement in symptoms.  Repeat CT showed improving jejunal inflammation.  She was subsequently discharged and told to follow-up with our services.    Since that time she has had a follow-up CT roughly 2 weeks ago that showed a short segment of mucosal hyperenhancement involving hepatic lecture could be underdistention versus mild colitis along with hepatic and angioma.    Today she still complains of lower abdominal discomfort described as an aching sensation that comes and goes worse with positional changes such as bending over and lifting.  Her primary care provider has her on lifting restrictions at her job.  Bowel pattern fluctuates between formed stools and episodes of loose stool.  This is her baseline.  No rectal bleeding or rectal pain.  No fever or chills.    She has heartburn for which she takes omeprazole with good results.  No nausea, vomiting, dysphagia, odynophagia.  Appetite is good.  Her weight is stable.    Past Medical History:   Diagnosis Date   • ADHD    • Anxiety    • Asthma    • Chest pain, midsternal     states w/anxiety & activity, relieved w/tums, not sure if reflux or d/t anxiety   • Depression    • GERD (gastroesophageal  reflux disease)    • History of acquired CHF (congestive heart failure) 2015    with/after pregnancy   • History of gestational hypertension    • History of transfusion 2015    after delivery   • Hx gestational diabetes    • Leukocytosis 2022   • Low back pain     right, h/o slipped disc   • Migraines    • Personal history of cardiomyopathy 2015    w/pregnancy, states treated by Dr Ortiz at [facility]       Past Surgical History:   Procedure Laterality Date   •  SECTION      x3   • CHIN IMPLANT INSERTION      w/jaw surgery   • D & C HYSTEROSCOPY ENDOMETRIAL ABLATION N/A 2019    Procedure: INTRAUTERINE DEVICE REMOVAL  DIAGNOSTIC HYSTEROSCOPY, DILATATION AND CURETTAGE, NOVASURE ENDOMETRIAL ABLATION.;  Surgeon: Elvis Andre MD;  Location: [facility] OR;  Service: Obstetrics/Gynecology   • TOTAL LAPAROSCOPIC HYSTERECTOMY N/A 2021    Procedure: TOTAL LAPAROSCOPIC HYSTERECTOMY, POSSIBLE TOTAL ABDOMINAL HYSTERECTOMY;  Surgeon: Elvis Andre MD;  Location: [facility] OR;  Service: Obstetrics/Gynecology;  Laterality: N/A;   • TUBAL ABDOMINAL LIGATION         Scheduled Meds:     Continuous Infusions: No current facility-administered medications for this visit.      PRN Meds:     No Known Allergies    Social History     Socioeconomic History   • Marital status: Single   Tobacco Use   • Smoking status: Former     Packs/day: 0.50     Years: 13.00     Pack years: 6.50     Types: Cigarettes     Start date: 1999     Quit date: 2015     Years since quittin.0   • Smokeless tobacco: Never   Vaping Use   • Vaping Use: Never used   Substance and Sexual Activity   • Alcohol use: Yes     Comment: few times a year   • Drug use: No   • Sexual activity: Yes     Partners: Male     Birth control/protection: Surgical, None       Family History   Problem Relation Age of Onset   • Breast cancer Mother    • Kidney failure Mother    • Diabetes Mother    • Heart failure Mother    •  Hypertension Mother    • Anxiety disorder Mother    • Cancer Mother    • Depression Mother    • Kidney disease Mother    • Asthma Father    • Sleep apnea Father    • Alcohol abuse Father    • Arthritis Father    • Heart failure Maternal Grandfather    • Malig Hyperthermia Neg Hx        Review of Systems   Constitutional: Negative for activity change, appetite change, fatigue, fever and unexpected weight change.   HENT: Negative for trouble swallowing.    Respiratory: Negative for apnea, cough, choking, chest tightness, shortness of breath and wheezing.    Cardiovascular: Negative for chest pain, palpitations and leg swelling.   Gastrointestinal: Positive for abdominal pain. Negative for abdominal distention, anal bleeding, blood in stool, constipation, diarrhea, nausea, rectal pain and vomiting.       Vitals:    01/30/23 0921   BP: 138/87   Pulse: 78   Temp: 98.2 °F (36.8 °C)   SpO2: 97%       Physical Exam  Constitutional:       Appearance: She is well-developed.   Abdominal:      General: Bowel sounds are normal. There is no distension.      Palpations: Abdomen is soft. There is no mass.      Tenderness: There is no abdominal tenderness. There is no guarding.      Hernia: No hernia is present.   Skin:     General: Skin is warm and dry.      Capillary Refill: Capillary refill takes less than 2 seconds.   Neurological:      Mental Status: She is alert and oriented to person, place, and time.   Psychiatric:         Behavior: Behavior normal.     Assessment    Diagnoses and all orders for this visit:    1. Abnormal CT scan, colon (Primary)  -     Case Request; Standing  -     Case Request  -     CBC & Differential  -     Comprehensive Metabolic Panel  -     C-reactive Protein  -     Sedimentation Rate    2. Lower abdominal pain  -     Case Request; Standing  -     Case Request  -     CBC & Differential  -     Comprehensive Metabolic Panel  -     C-reactive Protein  -     Sedimentation Rate    Other orders  -      dicyclomine (BENTYL) 10 MG capsule; Take 1 capsule by mouth 3 (Three) Times a Day As Needed (Abdominal pain).  Dispense: 120 capsule; Refill: 2       Plan    Arrange colonoscopy with Dr. Ríos to follow-up on abnormality seen on recent CT and lower abdominal discomfort  Trial of antispasmodic Bentyl in the interim  Risk/benefits of procedure reviewed the patient all questions were answered  Follow-up and further recommendations pending endoscopic findings         KUNAL Ledezma  [facility]  [address] [place] [address]  Office: [phone]

## 2023-01-30 NOTE — TELEPHONE ENCOUNTER
OK FOR HUB TO READ   TIM patient via telephone for COLONOSCOPY. Scheduled 4/20/23 with arrival time of 0815AM. Prep paperwork mailed to verified address on file. Patient advised arrival time may change based on [facility] guidelines. TIM PAINTER

## 2023-01-31 RX ORDER — LISINOPRIL 20 MG/1
20 TABLET ORAL DAILY
Qty: 30 TABLET | Refills: 3 | Status: SHIPPED | OUTPATIENT
Start: 2023-01-31 | End: 2023-02-21 | Stop reason: SDUPTHER

## 2023-02-21 RX ORDER — LISINOPRIL 20 MG/1
20 TABLET ORAL DAILY
Qty: 30 TABLET | Refills: 3 | Status: SHIPPED | OUTPATIENT
Start: 2023-02-21

## 2023-02-22 RX ORDER — NORGESTIMATE AND ETHINYL ESTRADIOL 0.25-0.035
1 KIT ORAL DAILY
Qty: 84 EACH | Refills: 6 | Status: SHIPPED | OUTPATIENT
Start: 2023-02-22

## 2023-02-24 DIAGNOSIS — F90.0 ATTENTION DEFICIT HYPERACTIVITY DISORDER (ADHD), PREDOMINANTLY INATTENTIVE TYPE: ICD-10-CM

## 2023-02-24 NOTE — TELEPHONE ENCOUNTER
Caller: Talisha Zepeda    Relationship: Self    Best call back number: [phone]    Requested Prescriptions:   Requested Prescriptions     Pending Prescriptions Disp Refills   • lisdexamfetamine (Vyvanse) 30 MG capsule 30 capsule 0     Sig: Take 1 capsule by mouth Every Morning ADHD        Pharmacy where request should be sent: [facility] #[id number] [place], [place] - [address] [facility] [address] [phone] [facility] [phone]      Additional details provided by patient: PATIENT IS OUT OF MEDICATION    Does the patient have less than a 3 day supply:  [x] Yes  [] No    Would you like a call back once the refill request has been completed: [x] Yes [] No    If the office needs to give you a call back, can they leave a voicemail: [x] Yes [] No    Michael Norman Rep   02/24/23 07:46 EST

## 2023-03-23 ENCOUNTER — TELEPHONE (OUTPATIENT)
Dept: FAMILY MEDICINE CLINIC | Facility: CLINIC | Age: 43
End: 2023-03-23
Payer: MEDICAID

## 2023-03-23 DIAGNOSIS — F90.0 ATTENTION DEFICIT HYPERACTIVITY DISORDER (ADHD), PREDOMINANTLY INATTENTIVE TYPE: ICD-10-CM

## 2023-03-23 RX ORDER — OMEPRAZOLE 40 MG/1
40 CAPSULE, DELAYED RELEASE ORAL DAILY
Qty: 90 CAPSULE | Refills: 3 | Status: SHIPPED | OUTPATIENT
Start: 2023-03-23

## 2023-03-23 NOTE — TELEPHONE ENCOUNTER
Caller: Duane Talisha KRISTOPHER    Relationship: Self    Best call back number: [phone]    Requested Prescriptions:   Requested Prescriptions     Pending Prescriptions Disp Refills   • lisdexamfetamine (Vyvanse) 30 MG capsule 30 capsule 0     Sig: Take 1 capsule by mouth Every Morning ADHD   • norgestimate-ethinyl estradiol (Sprintec 28) 0.25-35 MG-MCG per tablet 84 each 6     Sig: Take 1 tablet by mouth Daily. Skip blanks, and take active pills continuously for ovulation suppression      OMEPRAZOLE 90 DAYS 40 MG ONCE DAILY    Pharmacy where request should be sent: [facility] #[id number] [facility] [address] [facility] [address] [facility] [phone] [facility] [phone]      Last office visit with prescribing clinician: 1/24/2023     Additional details provided by patient: PATIENT HAS NO DOSES LEFT OF VYVASE AND OMEPRAZOLE. PATIENT HAS A WEEK LEFT OF NORGESTIMATE.    Does the patient have less than a 3 day supply:  [x] Yes  [] No    Would you like a call back once the refill request has been completed: [] Yes [] No    If the office needs to give you a call back, can they leave a voicemail: [] Yes [] No     Michael Mclaughlin Rep   03/23/23 11:13 EDT

## 2023-03-24 RX ORDER — NORGESTIMATE AND ETHINYL ESTRADIOL 0.25-0.035
1 KIT ORAL DAILY
Qty: 84 EACH | Refills: 6 | OUTPATIENT
Start: 2023-03-24

## 2023-04-09 ENCOUNTER — APPOINTMENT (OUTPATIENT)
Dept: GENERAL RADIOLOGY | Facility: HOSPITAL | Age: 43
End: 2023-04-09
Payer: MEDICAID

## 2023-04-09 PROCEDURE — 73502 X-RAY EXAM HIP UNI 2-3 VIEWS: CPT

## 2023-04-09 PROCEDURE — 99284 EMERGENCY DEPT VISIT MOD MDM: CPT

## 2023-04-10 ENCOUNTER — APPOINTMENT (OUTPATIENT)
Dept: CT IMAGING | Facility: HOSPITAL | Age: 43
End: 2023-04-10
Payer: MEDICAID

## 2023-04-10 ENCOUNTER — HOSPITAL ENCOUNTER (EMERGENCY)
Facility: HOSPITAL | Age: 43
Discharge: HOME | End: 2023-04-10
Attending: EMERGENCY MEDICINE | Admitting: EMERGENCY MEDICINE
Payer: MEDICAID

## 2023-04-10 VITALS
HEART RATE: 97 BPM | RESPIRATION RATE: 16 BRPM | TEMPERATURE: 98.9 F | HEIGHT: 62 IN | SYSTOLIC BLOOD PRESSURE: 144 MMHG | BODY MASS INDEX: 24.84 KG/M2 | DIASTOLIC BLOOD PRESSURE: 83 MMHG | WEIGHT: 135 LBS | OXYGEN SATURATION: 99 %

## 2023-04-10 DIAGNOSIS — S09.90XA INJURY OF HEAD, INITIAL ENCOUNTER: ICD-10-CM

## 2023-04-10 DIAGNOSIS — S70.02XA CONTUSION OF LEFT HIP, INITIAL ENCOUNTER: Primary | ICD-10-CM

## 2023-04-10 DIAGNOSIS — S16.1XXA ACUTE STRAIN OF NECK MUSCLE, INITIAL ENCOUNTER: ICD-10-CM
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
## 2023-04-10 PROCEDURE — 70450 CT HEAD/BRAIN W/O DYE: CPT

## 2023-04-10 PROCEDURE — 74176 CT ABD & PELVIS W/O CONTRAST: CPT

## 2023-04-10 PROCEDURE — 72125 CT NECK SPINE W/O DYE: CPT

## 2023-04-10 RX ORDER — METHOCARBAMOL 750 MG/1
750 TABLET, FILM COATED ORAL 3 TIMES DAILY
Qty: 21 TABLET | Refills: 0 | Status: SHIPPED | OUTPATIENT
Start: 2023-04-10

## 2023-04-10 RX ORDER — HYDROCODONE BITARTRATE AND ACETAMINOPHEN 7.5; 325 MG/1; MG/1
1 TABLET ORAL EVERY 4 HOURS PRN
Qty: 16 TABLET | Refills: 0 | Status: SHIPPED | OUTPATIENT
Start: 2023-04-10 | End: 2023-04-20 | Stop reason: HOSPADM

## 2023-04-10 RX ORDER — OXYCODONE HYDROCHLORIDE AND ACETAMINOPHEN 5; 325 MG/1; MG/1
1 TABLET ORAL ONCE
Status: COMPLETED | OUTPATIENT
Start: 2023-04-10 | End: 2023-04-10

## 2023-04-10 RX ADMIN — OXYCODONE AND ACETAMINOPHEN 1 TABLET: 5; 325 TABLET ORAL at 04:09
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
## 2023-04-10 NOTE — DISCHARGE INSTRUCTIONS
Turn to the ER should your pain worsen, should you develop new symptoms not addressed at today's visit, or should you have any further concerns.

## 2023-04-10 NOTE — Clinical Note
[facility] EMERGENCY DEPARTMENT  [address] [facility] [address]  Phone: [phone]    Talisha Zepeda was seen and treated in our emergency department on 4/9/2023.  She may return to work on 04/13/2023.         Thank you for choosing [facility].    Trung Auguste III, PA
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
## 2023-04-12 ENCOUNTER — TELEPHONE (OUTPATIENT)
Dept: FAMILY MEDICINE CLINIC | Facility: CLINIC | Age: 43
End: 2023-04-12
Payer: MEDICAID

## 2023-04-19 ENCOUNTER — TELEPHONE (OUTPATIENT)
Dept: GASTROENTEROLOGY | Facility: CLINIC | Age: 43
End: 2023-04-19
Payer: MEDICAID

## 2023-04-19 NOTE — TELEPHONE ENCOUNTER
CALLER: Talisha Zepeda     RELATIONSHIP TO PATIENT: Self    BEST CALL BACK NUMBER:[phone]    CALL REGARDING: Patient is having a colonoscopy tomorrow and stomach is hurting would like to know if she can eat some saltine crackers to settle stomach. Patient has been feeling nausea     Patient request a Call Back

## 2023-04-19 NOTE — TELEPHONE ENCOUNTER
Called pt and left msg on vm (ok per ABBEY) and advised it is best not to eat anything, but if she was having some stomach upset she could take a TUMS. Advised to call back if any questions.

## 2023-04-20 ENCOUNTER — HOSPITAL ENCOUNTER (OUTPATIENT)
Facility: HOSPITAL | Age: 43
Setting detail: HOSPITAL OUTPATIENT SURGERY
Discharge: HOME | End: 2023-04-20
Attending: INTERNAL MEDICINE | Admitting: INTERNAL MEDICINE
Payer: MEDICAID

## 2023-04-20 ENCOUNTER — ANESTHESIA EVENT (OUTPATIENT)
Dept: GASTROENTEROLOGY | Facility: HOSPITAL | Age: 43
End: 2023-04-20
Payer: MEDICAID

## 2023-04-20 ENCOUNTER — ANESTHESIA (OUTPATIENT)
Dept: GASTROENTEROLOGY | Facility: HOSPITAL | Age: 43
End: 2023-04-20
Payer: MEDICAID

## 2023-04-20 VITALS
HEIGHT: 62 IN | HEART RATE: 85 BPM | OXYGEN SATURATION: 98 % | SYSTOLIC BLOOD PRESSURE: 158 MMHG | BODY MASS INDEX: 24.66 KG/M2 | DIASTOLIC BLOOD PRESSURE: 103 MMHG | WEIGHT: 134 LBS | RESPIRATION RATE: 15 BRPM

## 2023-04-20 PROCEDURE — 25010000002 PROPOFOL 10 MG/ML EMULSION: Performed by: ANESTHESIOLOGY

## 2023-04-20 PROCEDURE — 45378 DIAGNOSTIC COLONOSCOPY: CPT | Performed by: INTERNAL MEDICINE

## 2023-04-20 PROCEDURE — S0260 H&P FOR SURGERY: HCPCS | Performed by: INTERNAL MEDICINE

## 2023-04-20 RX ORDER — LIDOCAINE HYDROCHLORIDE 20 MG/ML
INJECTION, SOLUTION INFILTRATION; PERINEURAL AS NEEDED
Status: DISCONTINUED | OUTPATIENT
Start: 2023-04-20 | End: 2023-04-20 | Stop reason: SURG

## 2023-04-20 RX ORDER — SODIUM CHLORIDE, SODIUM LACTATE, POTASSIUM CHLORIDE, CALCIUM CHLORIDE 600; 310; 30; 20 MG/100ML; MG/100ML; MG/100ML; MG/100ML
30 INJECTION, SOLUTION INTRAVENOUS CONTINUOUS PRN
Status: DISCONTINUED | OUTPATIENT
Start: 2023-04-20 | End: 2023-04-20 | Stop reason: HOSPADM

## 2023-04-20 RX ORDER — PROPOFOL 10 MG/ML
VIAL (ML) INTRAVENOUS CONTINUOUS PRN
Status: DISCONTINUED | OUTPATIENT
Start: 2023-04-20 | End: 2023-04-20 | Stop reason: SURG

## 2023-04-20 RX ORDER — SODIUM CHLORIDE 0.9 % (FLUSH) 0.9 %
10 SYRINGE (ML) INJECTION AS NEEDED
Status: DISCONTINUED | OUTPATIENT
Start: 2023-04-20 | End: 2023-04-20 | Stop reason: HOSPADM

## 2023-04-20 RX ORDER — PROPOFOL 10 MG/ML
VIAL (ML) INTRAVENOUS AS NEEDED
Status: DISCONTINUED | OUTPATIENT
Start: 2023-04-20 | End: 2023-04-20 | Stop reason: SURG

## 2023-04-20 RX ORDER — SODIUM CHLORIDE 9 MG/ML
40 INJECTION, SOLUTION INTRAVENOUS AS NEEDED
Status: DISCONTINUED | OUTPATIENT
Start: 2023-04-20 | End: 2023-04-20 | Stop reason: HOSPADM

## 2023-04-20 RX ADMIN — SODIUM CHLORIDE, POTASSIUM CHLORIDE, SODIUM LACTATE AND CALCIUM CHLORIDE 30 ML/HR: 600; 310; 30; 20 INJECTION, SOLUTION INTRAVENOUS at 09:30

## 2023-04-20 RX ADMIN — PROPOFOL 150 MG: 10 INJECTION, EMULSION INTRAVENOUS at 09:41

## 2023-04-20 RX ADMIN — LIDOCAINE HYDROCHLORIDE 100 MG: 20 INJECTION, SOLUTION INFILTRATION; PERINEURAL at 09:39

## 2023-04-20 RX ADMIN — Medication 200 MCG/KG/MIN: at 09:41

## 2023-04-20 NOTE — ANESTHESIA PREPROCEDURE EVALUATION
Anesthesia Evaluation     Patient summary reviewed and Nursing notes reviewed   NPO Solid Status: > 8 hours  NPO Liquid Status: > 4 hours           Airway   Mallampati: II  Neck ROM: full  No difficulty expected  Dental - normal exam     Pulmonary     breath sounds clear to auscultation  (+) a smoker Former, asthma,  Cardiovascular     Rhythm: regular    (+) hypertension,       Neuro/Psych  (+) headaches, psychiatric history ADHD and Anxiety,    GI/Hepatic/Renal/Endo    (+)  GERD,      Musculoskeletal     Abdominal    Substance History      OB/GYN          Other                        Anesthesia Plan    ASA 2     MAC     intravenous induction     Anesthetic plan, risks, benefits, and alternatives have been provided, discussed and informed consent has been obtained with: patient.        CODE STATUS:

## 2023-04-20 NOTE — ANESTHESIA POSTPROCEDURE EVALUATION
"Patient: Talisha Zepeda    Procedure Summary     Date: 04/20/23 Room / Location: [facility] ENDOSCOPY 8 / [facility] ENDOSCOPY    Anesthesia Start: 0938 Anesthesia Stop: 1006    Procedure: COLONOSCOPY TO CECUM Diagnosis:       Abnormal CT scan, colon      Lower abdominal pain      (Abnormal CT scan, colon [R93.3])      (Lower abdominal pain [R10.30])    Surgeons: Trevin Ríos MD Provider: Anastacio Qureshi MD    Anesthesia Type: MAC ASA Status: 2          Anesthesia Type: MAC    Vitals  Vitals Value Taken Time   /103 04/20/23 1021   Temp     Pulse 85 04/20/23 1021   Resp 15 04/20/23 1021   SpO2 98 % 04/20/23 1021           Post Anesthesia Care and Evaluation    Patient location during evaluation: bedside  Patient participation: complete - patient participated  Level of consciousness: sleepy but conscious  Pain score: 0  Pain management: adequate    Airway patency: patent  Anesthetic complications: No anesthetic complications    Cardiovascular status: acceptable  Respiratory status: acceptable  Hydration status: acceptable    Comments: BP (!) 158/103 (BP Location: Left arm, Patient Position: Sitting)   Pulse 85   Resp 15   Ht 157.5 cm (62\")   Wt 60.8 kg (134 lb)   LMP 10/29/2021 (Approximate) Comment: blee  SpO2 98%   BMI 24.51 kg/m²         "

## 2023-04-20 NOTE — H&P
[facility]  Pre Procedure History & Physical    Chief Complaint:   Time for my colonoscopy    Subjective     HPI:   42 y.o. female presenting to endoscopy unit today for surveillance colonoscopy.    Past Medical History:   Past Medical History:   Diagnosis Date   • ADHD    • Anxiety    • Asthma    • Chest pain, midsternal     states w/anxiety & activity, relieved w/tums, not sure if reflux or d/t anxiety   • Depression    • GERD (gastroesophageal reflux disease)    • History of acquired CHF (congestive heart failure) 2015    with/after pregnancy   • History of gestational hypertension    • History of transfusion 2015    after delivery   • Hx gestational diabetes 2015   • Leukocytosis 12/17/2022   • Low back pain     right, h/o slipped disc   • Migraines    • Personal history of cardiomyopathy 2015    w/pregnancy, states treated by Dr Ortiz at [facility]       Family History:  Family History   Problem Relation Age of Onset   • Breast cancer Mother    • Kidney failure Mother    • Diabetes Mother    • Heart failure Mother    • Hypertension Mother    • Anxiety disorder Mother    • Cancer Mother    • Depression Mother    • Kidney disease Mother    • Asthma Father    • Sleep apnea Father    • Alcohol abuse Father    • Arthritis Father    • Heart failure Maternal Grandfather    • Malig Hyperthermia Neg Hx        Social History:   reports that she quit smoking about 8 years ago. Her smoking use included cigarettes. She started smoking about 24 years ago. She has a 6.50 pack-year smoking history. She has never used smokeless tobacco. She reports current alcohol use. She reports that she does not use drugs.    Medications:   Medications Prior to Admission   Medication Sig Dispense Refill Last Dose   • dicyclomine (BENTYL) 10 MG capsule Take 1 capsule by mouth 3 (Three) Times a Day As Needed (Abdominal pain). 120 capsule 2 Past Month   • lisdexamfetamine (Vyvanse) 30 MG capsule Take 1 capsule by mouth Every  "Morning ADHD 30 capsule 0 Past Week   • lisinopril (PRINIVIL,ZESTRIL) 20 MG tablet Take 1 tablet by mouth Daily. 30 tablet 3 4/20/2023 at 0500   • norgestimate-ethinyl estradiol (Sprintec 28) 0.25-35 MG-MCG per tablet Take 1 tablet by mouth Daily. Skip blanks, and take active pills continuously for ovulation suppression 84 each 6 Past Week   • omeprazole (priLOSEC) 40 MG capsule Take 1 capsule by mouth Daily. 90 capsule 3 Past Week   • sertraline (ZOLOFT) 50 MG tablet Take 1 tablet by mouth Daily. 30 tablet 3 Past Week   • diclofenac (VOLTAREN) 50 MG EC tablet Take 1 tablet by mouth 3 (Three) Times a Day. 21 tablet 0    • HYDROcodone-acetaminophen (NORCO) 7.5-325 MG per tablet Take 1 tablet by mouth Every 4 (Four) Hours As Needed for Moderate Pain. 16 tablet 0    • methocarbamol (ROBAXIN) 750 MG tablet Take 1 tablet by mouth 3 (Three) Times a Day. 21 tablet 0 More than a month       Allergies:  Patient has no known allergies.    Objective     Blood pressure 150/97, pulse 79, resp. rate 10, height 157.5 cm (62\"), weight 60.8 kg (134 lb), last menstrual period 10/29/2021, SpO2 97 %, not currently breastfeeding.  Physical Exam:   General: patient awake, alert and cooperative    Assessment & Plan     Diagnosis:  abd pain    Anticipated Surgical Procedure:  Colonoscopy    The risks, benefits, and alternatives of this procedure have been discussed with the patient or the responsible party- the patient understands and agrees to proceed.                                                                "

## 2023-04-20 NOTE — DISCHARGE INSTRUCTIONS
For the next 24 hours patient needs to be with a responsible adult.    For 24 hours DO NOT drive, operate machinery, appliances, drink alcohol, make important decisions or sign legal documents.    Start with a light or bland diet if you are feeling sick to your stomach otherwise advance to regular diet as tolerated.    Follow recommendations on procedure report if provided by your doctor.    Call Dr. Ríos for problems [phone].    Problems may include but not limited to: large amounts of bleeding, trouble breathing, repeated vomiting, severe unrelieved pain, fever or chills.

## 2023-04-24 RX ORDER — NORGESTIMATE AND ETHINYL ESTRADIOL 0.25-0.035
1 KIT ORAL DAILY
Qty: 28 EACH | Refills: 0 | Status: SHIPPED | OUTPATIENT
Start: 2023-04-24

## 2023-04-26 DIAGNOSIS — F90.0 ATTENTION DEFICIT HYPERACTIVITY DISORDER (ADHD), PREDOMINANTLY INATTENTIVE TYPE: ICD-10-CM

## 2023-04-26 NOTE — TELEPHONE ENCOUNTER
Caller: Talisha Zepeda    Relationship: Self    Best call back number: [phone]    Requested Prescriptions:   Requested Prescriptions     Pending Prescriptions Disp Refills   • lisdexamfetamine (Vyvanse) 30 MG capsule 30 capsule 0     Sig: Take 1 capsule by mouth Every Morning ADHD        Pharmacy where request should be sent: [id number] [place], [place] - [address] [facility] [address] [facility] [phone] [facility] [phone]      Last office visit with prescribing clinician: Visit date not found   Last telemedicine visit with prescribing clinician: Visit date not found   Next office visit with prescribing clinician: Visit date not found     Does the patient have less than a 3 day supply:  [x] Yes  [] No    Would you like a call back once the refill request has been completed: [x] Yes [] No    If the office needs to give you a call back, can they leave a voicemail: [x] Yes [] No    Michael Zhang Rep   04/26/23 10:15 EDT

## 2023-04-27 ENCOUNTER — TELEPHONE (OUTPATIENT)
Dept: GASTROENTEROLOGY | Facility: CLINIC | Age: 43
End: 2023-04-27
Payer: MEDICAID

## 2023-04-27 NOTE — TELEPHONE ENCOUNTER
She can not do Monday in afternoon she has 2 children she has to  @school starting at 220. Please advise

## 2023-05-15 ENCOUNTER — TELEPHONE (OUTPATIENT)
Dept: GASTROENTEROLOGY | Facility: CLINIC | Age: 43
End: 2023-05-15
Payer: MEDICAID

## 2023-05-15 NOTE — TELEPHONE ENCOUNTER
Caller: Talisha Zepeda    Relationship: Self    Best call back number: [phone]    What is the best time to reach you: IF NO ANSWER ON CELL CALL SECONDARY NUMBER    Who are you requesting to speak with (clinical staff, provider,  specific staff member): CLINICAL STAFF    Do you know the name of the person who called: ERVIN LOVETT     What was the call regarding: SCHEDULING APPT WITH DR. TYSON. NEED TO BE SCHEDULED BETWEEN 930 . SEE NOTES 04.27.23    Do you require a callback: YES PLEASE

## 2023-05-22 ENCOUNTER — TELEPHONE (OUTPATIENT)
Dept: FAMILY MEDICINE CLINIC | Facility: CLINIC | Age: 43
End: 2023-05-22
Payer: MEDICAID

## 2023-05-22 NOTE — TELEPHONE ENCOUNTER
Caller: Talisha Zepeda    Best call back number: [phone]    What was the call regarding: PATIENT WOULD LIKE A CALL WITH DR. ALVARADO'S OFFBOARDING INFORMATION.    Do you require a callback: YES

## 2023-05-30 DIAGNOSIS — F90.0 ATTENTION DEFICIT HYPERACTIVITY DISORDER (ADHD), PREDOMINANTLY INATTENTIVE TYPE: ICD-10-CM

## 2023-05-30 NOTE — TELEPHONE ENCOUNTER
Caller: Talisha Zepeda    Relationship: Self    Best call back number: [phone]    Requested Prescriptions:   Requested Prescriptions     Pending Prescriptions Disp Refills   • lisdexamfetamine (Vyvanse) 30 MG capsule 30 capsule 0     Sig: Take 1 capsule by mouth Every Morning ADHD        Pharmacy where request should be sent: [id number] [place], [place] - [address] [facility] [address] [facility] [phone] [facility] [phone]      Last office visit with prescribing clinician: 1/24/2023   Last telemedicine visit with prescribing clinician: Visit date not found   Next office visit with prescribing clinician: Visit date not found     Additional details provided by patient: PATIENT IS OUT OF MEDICATION     Does the patient have less than a 3 day supply:  [x] Yes  [] No    Would you like a call back once the refill request has been completed: [] Yes [x] No    If the office needs to give you a call back, can they leave a voicemail: [] Yes [x] No    Michael Norman Rep   05/30/23 12:49 EDT

## 2023-06-07 ENCOUNTER — TELEPHONE (OUTPATIENT)
Dept: GASTROENTEROLOGY | Facility: CLINIC | Age: 43
End: 2023-06-07
Payer: MEDICAID

## 2023-06-07 NOTE — TELEPHONE ENCOUNTER
Call from pt.  States understood needs f/u appt with DR Ríos to discuss need for EGD (see encounter notes of 4/25/23).  Attempt schedule pt without success.     Message to Taniya.

## 2023-08-29 ENCOUNTER — TRANSCRIBE ORDERS (OUTPATIENT)
Dept: ADMINISTRATIVE | Facility: HOSPITAL | Age: 43
End: 2023-08-29
Payer: MEDICAID

## 2023-08-29 DIAGNOSIS — M25.572 ACUTE LEFT ANKLE PAIN: Primary | ICD-10-CM

## 2023-09-13 ENCOUNTER — OFFICE VISIT (OUTPATIENT)
Dept: GASTROENTEROLOGY | Facility: CLINIC | Age: 43
End: 2023-09-13
Payer: MEDICAID

## 2023-09-13 VITALS
OXYGEN SATURATION: 98 % | SYSTOLIC BLOOD PRESSURE: 164 MMHG | HEIGHT: 62 IN | TEMPERATURE: 97.6 F | WEIGHT: 150 LBS | BODY MASS INDEX: 27.6 KG/M2 | DIASTOLIC BLOOD PRESSURE: 104 MMHG | HEART RATE: 76 BPM

## 2023-09-13 DIAGNOSIS — R10.13 EPIGASTRIC PAIN: Primary | ICD-10-CM

## 2023-09-13 DIAGNOSIS — R13.10 DYSPHAGIA, UNSPECIFIED TYPE: ICD-10-CM

## 2023-09-13 DIAGNOSIS — R10.13 DYSPEPSIA: ICD-10-CM

## 2023-09-13 PROCEDURE — 1160F RVW MEDS BY RX/DR IN RCRD: CPT | Performed by: NURSE PRACTITIONER

## 2023-09-13 PROCEDURE — 99214 OFFICE O/P EST MOD 30 MIN: CPT | Performed by: NURSE PRACTITIONER

## 2023-09-13 PROCEDURE — 3077F SYST BP >= 140 MM HG: CPT | Performed by: NURSE PRACTITIONER

## 2023-09-13 PROCEDURE — 1159F MED LIST DOCD IN RCRD: CPT | Performed by: NURSE PRACTITIONER

## 2023-09-13 PROCEDURE — 3080F DIAST BP >= 90 MM HG: CPT | Performed by: NURSE PRACTITIONER

## 2023-09-13 RX ORDER — LISINOPRIL 40 MG/1
40 TABLET ORAL DAILY
Qty: 30 TABLET | Refills: 5 | COMMUNITY
Start: 2023-07-27 | End: 2024-01-23

## 2023-09-13 RX ORDER — ROSUVASTATIN CALCIUM 10 MG/1
10 TABLET, COATED ORAL DAILY
Qty: 30 TABLET | Refills: 5 | COMMUNITY
Start: 2023-07-07 | End: 2024-01-03

## 2023-09-13 NOTE — PROGRESS NOTES
Chief Complaint   Patient presents with    Abdominal Pain       HPI    Talisha Zepeda is a  43 y.o. female here for a follow up visit for epigastric pain.    This patient follows with Dr. Ríos and myself.    Patient reports ongoing epigastric pain, dyspepsia and esophageal dysphagia since the spring.  She denies regurgitation.  Pain described as a burning aching sensation that comes and goes.  No nausea or vomiting.  Appetite is good.  Weight is stable.  She is currently on omeprazole 40 mg once daily.  She has been on's particular PPI for a number of years.    She reports bowel pattern is regular but frequently gets looser consistency stool which she finds manageable.  No rectal pain or rectal bleeding.  No lower abdominal pain.    Reviewed C-scope 2023 - Normal ileum and nonbleeding internal hemorrhoids.  Recall 10 years    Past Medical History:   Diagnosis Date    ADHD     Anxiety     Asthma     Chest pain, midsternal     states w/anxiety & activity, relieved w/tums, not sure if reflux or d/t anxiety    Depression     GERD (gastroesophageal reflux disease)     History of acquired CHF (congestive heart failure) 2015    with/after pregnancy    History of gestational hypertension     History of transfusion 2015    after delivery    Hx gestational diabetes 2015    Leukocytosis 2022    Low back pain     right, h/o slipped disc    Migraines     Personal history of cardiomyopathy 2015    w/pregnancy, states treated by Dr Ortiz at [facility]       Past Surgical History:   Procedure Laterality Date     SECTION      x3    CHIN IMPLANT INSERTION      w/jaw surgery    COLONOSCOPY N/A 2023    Procedure: COLONOSCOPY TO CECUM;  Surgeon: Trevin Ríos MD;  Location: [facility] END[facility]COPY;  Service: Gastroenterology;  Laterality: N/A;  PRE- ABDOMINAL PAIN, ABNORMAL IMAGING  POST- NORMAL    D & C HYSTEROSCOPY ENDOMETRIAL ABLATION N/A 2019    Procedure: INTRAUTERINE DEVICE REMOVAL  DIAGNOSTIC  HYSTEROSCOPY, DILATATION AND CURETTAGE, NOVASURE ENDOMETRIAL ABLATION.;  Surgeon: Elvis Andre MD;  Location:  [facility] OR;  Service: Obstetrics/Gynecology    TOTAL LAPAROSCOPIC HYSTERECTOMY N/A 2021    Procedure: TOTAL LAPAROSCOPIC HYSTERECTOMY, POSSIBLE TOTAL ABDOMINAL HYSTERECTOMY;  Surgeon: Elvis Andre MD;  Location:  [facility] OR;  Service: Obstetrics/Gynecology;  Laterality: N/A;    TUBAL ABDOMINAL LIGATION         Scheduled Meds:     Continuous Infusions: No current facility-administered medications for this visit.      PRN Meds:     No Known Allergies    Social History     Socioeconomic History    Marital status: Single   Tobacco Use    Smoking status: Former     Packs/day: 0.50     Years: 13.00     Pack years: 6.50     Types: Cigarettes     Start date: 1999     Quit date: 2015     Years since quittin.7    Smokeless tobacco: Never   Vaping Use    Vaping Use: Never used   Substance and Sexual Activity    Alcohol use: Yes     Comment: few times a year    Drug use: No    Sexual activity: Yes     Partners: Male     Birth control/protection: Surgical, None       Family History   Problem Relation Age of Onset    Breast cancer Mother     Kidney failure Mother     Diabetes Mother     Heart failure Mother     Hypertension Mother     Anxiety disorder Mother     Cancer Mother     Depression Mother     Kidney disease Mother     Asthma Father     Sleep apnea Father     Alcohol abuse Father     Arthritis Father     Heart failure Maternal Grandfather     Malig Hyperthermia Neg Hx        Review of Systems   HENT:  Positive for trouble swallowing.    Gastrointestinal:  Positive for abdominal pain.     Vitals:    23 0941   BP:  152/76   Pulse: 76   Temp: 97.6 °F (36.4 °C)   SpO2: 98%       Physical Exam  Constitutional:       Appearance: She is well-developed.   Abdominal:      General: Bowel sounds are normal. There is no distension.      Palpations: Abdomen is soft. There is no  mass.      Tenderness: There is no abdominal tenderness. There is no guarding.      Hernia: No hernia is present.   Skin:     General: Skin is warm and dry.      Capillary Refill: Capillary refill takes less than 2 seconds.   Neurological:      Mental Status: She is alert and oriented to person, place, and time.   Psychiatric:         Behavior: Behavior normal.     Assessment    Diagnoses and all orders for this visit:    1. Epigastric pain (Primary)  -     Case Request; Standing  -     Obtain Informed Consent; Standing  -     Case Request    2. Dysphagia, unspecified type  -     Case Request; Standing  -     Obtain Informed Consent; Standing  -     Case Request    3. Dyspepsia  -     Case Request; Standing  -     Obtain Informed Consent; Standing  -     Case Request       Plan    Schedule EGD at [facility] with Dr. Ríos for the above-mentioned complaints  Antireflux measures and dietary modifications reviewed. Low acid diet reviewed. Keep head of bed elevated. Stop eating/drinking at least 3 hours prior to bedtime. Eliminate caffeine and carbonated beverages.  Weight loss encouraged if BMI over 25.  Stop omeprazole  Begin Protonix 40 mg once daily  We discussed colonoscopy findings and all questions were answered.  Further recommendations pending endoscopic findings    Addendum: At checkout patient mentioned that if etiology can be determined for upper GI symptoms she may be able to get insurance to pay for dental implants.         KUNAL Ledzema  [facility]  [address] [place] [address]  Office: [phone]

## (undated) DEVICE — LN SMPL CO2 SHTRM SD STREAM W/M LUER

## (undated) DEVICE — GLV SURG SENSICARE W/ALOE PF LF 7.5 STRL

## (undated) DEVICE — SENSR O2 OXIMAX FNGR A/ 18IN NONSTR

## (undated) DEVICE — PATIENT RETURN ELECTRODE, SINGLE-USE, CONTACT QUALITY MONITORING, ADULT, WITH 9FT CORD, FOR PATIENTS WEIGING OVER 33LBS. (15KG): Brand: MEGADYNE

## (undated) DEVICE — CYSTO/BLADDER IRRIGATION SET, REGULATING CLAMP

## (undated) DEVICE — HARMONIC ACE +7 LAPAROSCOPIC SHEARS ADVANCED HEMOSTASIS 5MM DIAMETER 36CM SHAFT LENGTH  FOR USE WITH GRAY HAND PIECE ONLY: Brand: HARMONIC ACE

## (undated) DEVICE — ENDOPATH XCEL UNIVERSAL TROCAR STABLILITY SLEEVES: Brand: ENDOPATH XCEL

## (undated) DEVICE — ENDOPATH XCEL BLADELESS TROCARS WITH STABILITY SLEEVES: Brand: ENDOPATH XCEL

## (undated) DEVICE — GLV SURG SENSICARE PI MIC PF SZ7.5 LF STRL

## (undated) DEVICE — SUT MNCRYL 4/0 PS2 18 IN

## (undated) DEVICE — OCCL COLPO PNEUMO  STRL

## (undated) DEVICE — KT ORCA ORCAPOD DISP STRL

## (undated) DEVICE — VAGINAL PACKING: Brand: DEROYAL

## (undated) DEVICE — CANN O2 ETCO2 FITS ALL CONN CO2 SMPL A/ 7IN DISP LF

## (undated) DEVICE — CURETTE ENDOMTRL SXN

## (undated) DEVICE — PROB ABL ENDOMTRL NOVASURE/G1 W/SURESND BIP

## (undated) DEVICE — STRAP STIRUP SLP RNG 19X3.5IN DISP

## (undated) DEVICE — ADAPT CLN BIOGUARD AIR/H2O DISP

## (undated) DEVICE — ADHS SKIN PREMIERPRO EXOFIN TOPICAL HI/VISC .5ML

## (undated) DEVICE — MANIP UTER RUMI TP 6.7MMX8CM BLU

## (undated) DEVICE — GLV SURG SENSICARE ORTHO PF LF 7 STRL

## (undated) DEVICE — LAPAROSCOPIC SMOKE FILTRATION SYSTEM: Brand: PALL LAPAROSHIELD® PLUS LAPAROSCOPIC SMOKE FILTRATION SYSTEM

## (undated) DEVICE — PK TLH 90

## (undated) DEVICE — SAFESECURE,SECUREMENT,FOLEY CATH,STERILE: Brand: MEDLINE

## (undated) DEVICE — TUBING, SUCTION, 1/4" X 10', STRAIGHT: Brand: MEDLINE

## (undated) DEVICE — GOWN,PREVENTION PLUS,XXLARGE,STERILE: Brand: MEDLINE

## (undated) DEVICE — LAG PERI GYN: Brand: MEDLINE INDUSTRIES, INC.

## (undated) DEVICE — TOWEL,OR,DSP,ST,BLUE,STD,4/PK,20PK/CS: Brand: MEDLINE

## (undated) DEVICE — APPL CHLORAPREP HI/LITE 26ML ORNG

## (undated) DEVICE — SUCTION CANISTER, 1000CC,SAFELINER: Brand: DEROYAL